# Patient Record
Sex: FEMALE | Race: OTHER | HISPANIC OR LATINO | URBAN - METROPOLITAN AREA
[De-identification: names, ages, dates, MRNs, and addresses within clinical notes are randomized per-mention and may not be internally consistent; named-entity substitution may affect disease eponyms.]

---

## 2017-01-01 ENCOUNTER — INPATIENT (INPATIENT)
Facility: HOSPITAL | Age: 77
LOS: 19 days | DRG: 637 | End: 2017-04-06
Admitting: FAMILY MEDICINE
Payer: MEDICARE

## 2017-01-01 VITALS
DIASTOLIC BLOOD PRESSURE: 80 MMHG | HEART RATE: 64 BPM | SYSTOLIC BLOOD PRESSURE: 178 MMHG | HEIGHT: 64 IN | WEIGHT: 130.07 LBS | RESPIRATION RATE: 12 BRPM

## 2017-01-01 VITALS — HEART RATE: 45 BPM

## 2017-01-01 DIAGNOSIS — E16.2 HYPOGLYCEMIA, UNSPECIFIED: ICD-10-CM

## 2017-01-01 DIAGNOSIS — E13.638 OTHER SPECIFIED DIABETES MELLITUS WITH OTHER ORAL COMPLICATIONS: ICD-10-CM

## 2017-01-01 DIAGNOSIS — R06.89 OTHER ABNORMALITIES OF BREATHING: ICD-10-CM

## 2017-01-01 DIAGNOSIS — J45.909 UNSPECIFIED ASTHMA, UNCOMPLICATED: ICD-10-CM

## 2017-01-01 DIAGNOSIS — N28.9 DISORDER OF KIDNEY AND URETER, UNSPECIFIED: ICD-10-CM

## 2017-01-01 DIAGNOSIS — I46.9 CARDIAC ARREST, CAUSE UNSPECIFIED: ICD-10-CM

## 2017-01-01 DIAGNOSIS — N17.9 ACUTE KIDNEY FAILURE, UNSPECIFIED: ICD-10-CM

## 2017-01-01 DIAGNOSIS — E11.9 TYPE 2 DIABETES MELLITUS WITHOUT COMPLICATIONS: ICD-10-CM

## 2017-01-01 DIAGNOSIS — K59.00 CONSTIPATION, UNSPECIFIED: ICD-10-CM

## 2017-01-01 DIAGNOSIS — R06.00 DYSPNEA, UNSPECIFIED: ICD-10-CM

## 2017-01-01 DIAGNOSIS — I50.9 HEART FAILURE, UNSPECIFIED: ICD-10-CM

## 2017-01-01 DIAGNOSIS — E11.21 TYPE 2 DIABETES MELLITUS WITH DIABETIC NEPHROPATHY: ICD-10-CM

## 2017-01-01 DIAGNOSIS — I48.91 UNSPECIFIED ATRIAL FIBRILLATION: ICD-10-CM

## 2017-01-01 DIAGNOSIS — N18.9 CHRONIC KIDNEY DISEASE, UNSPECIFIED: ICD-10-CM

## 2017-01-01 DIAGNOSIS — R93.8 ABNORMAL FINDINGS ON DIAGNOSTIC IMAGING OF OTHER SPECIFIED BODY STRUCTURES: ICD-10-CM

## 2017-01-01 DIAGNOSIS — R52 PAIN, UNSPECIFIED: ICD-10-CM

## 2017-01-01 DIAGNOSIS — I50.21 ACUTE SYSTOLIC (CONGESTIVE) HEART FAILURE: ICD-10-CM

## 2017-01-01 DIAGNOSIS — R05 COUGH: ICD-10-CM

## 2017-01-01 DIAGNOSIS — I10 ESSENTIAL (PRIMARY) HYPERTENSION: ICD-10-CM

## 2017-01-01 DIAGNOSIS — R07.89 OTHER CHEST PAIN: ICD-10-CM

## 2017-01-01 DIAGNOSIS — Z51.5 ENCOUNTER FOR PALLIATIVE CARE: ICD-10-CM

## 2017-01-01 DIAGNOSIS — R09.02 HYPOXEMIA: ICD-10-CM

## 2017-01-01 DIAGNOSIS — Z29.9 ENCOUNTER FOR PROPHYLACTIC MEASURES, UNSPECIFIED: ICD-10-CM

## 2017-01-01 LAB
ALBUMIN SERPL ELPH-MCNC: 2.6 G/DL — LOW (ref 3.3–5.2)
ALBUMIN SERPL ELPH-MCNC: 2.7 G/DL — LOW (ref 3.3–5.2)
ALBUMIN SERPL ELPH-MCNC: 2.8 G/DL — LOW (ref 3.3–5.2)
ALBUMIN SERPL ELPH-MCNC: 3.1 G/DL — LOW (ref 3.3–5.2)
ALBUMIN SERPL ELPH-MCNC: 3.2 G/DL — LOW (ref 3.3–5.2)
ALBUMIN SERPL ELPH-MCNC: 3.2 G/DL — LOW (ref 3.3–5.2)
ALBUMIN SERPL ELPH-MCNC: 3.4 G/DL — SIGNIFICANT CHANGE UP (ref 3.3–5.2)
ALP SERPL-CCNC: 111 U/L — SIGNIFICANT CHANGE UP (ref 40–120)
ALP SERPL-CCNC: 115 U/L — SIGNIFICANT CHANGE UP (ref 40–120)
ALP SERPL-CCNC: 68 U/L — SIGNIFICANT CHANGE UP (ref 40–120)
ALP SERPL-CCNC: 80 U/L — SIGNIFICANT CHANGE UP (ref 40–120)
ALP SERPL-CCNC: 88 U/L — SIGNIFICANT CHANGE UP (ref 40–120)
ALP SERPL-CCNC: 95 U/L — SIGNIFICANT CHANGE UP (ref 40–120)
ALP SERPL-CCNC: 96 U/L — SIGNIFICANT CHANGE UP (ref 40–120)
ALT FLD-CCNC: 103 U/L — HIGH
ALT FLD-CCNC: 111 U/L — HIGH
ALT FLD-CCNC: 33 U/L — HIGH
ALT FLD-CCNC: 56 U/L — HIGH
ALT FLD-CCNC: 66 U/L — HIGH
ALT FLD-CCNC: 72 U/L — HIGH
ALT FLD-CCNC: 90 U/L — HIGH
ANION GAP SERPL CALC-SCNC: 10 MMOL/L — SIGNIFICANT CHANGE UP (ref 5–17)
ANION GAP SERPL CALC-SCNC: 11 MMOL/L — SIGNIFICANT CHANGE UP (ref 5–17)
ANION GAP SERPL CALC-SCNC: 12 MMOL/L — SIGNIFICANT CHANGE UP (ref 5–17)
ANION GAP SERPL CALC-SCNC: 13 MMOL/L — SIGNIFICANT CHANGE UP (ref 5–17)
ANION GAP SERPL CALC-SCNC: 14 MMOL/L — SIGNIFICANT CHANGE UP (ref 5–17)
ANION GAP SERPL CALC-SCNC: 15 MMOL/L — SIGNIFICANT CHANGE UP (ref 5–17)
ANION GAP SERPL CALC-SCNC: 15 MMOL/L — SIGNIFICANT CHANGE UP (ref 5–17)
ANION GAP SERPL CALC-SCNC: 16 MMOL/L — SIGNIFICANT CHANGE UP (ref 5–17)
ANION GAP SERPL CALC-SCNC: 16 MMOL/L — SIGNIFICANT CHANGE UP (ref 5–17)
ANION GAP SERPL CALC-SCNC: 17 MMOL/L — SIGNIFICANT CHANGE UP (ref 5–17)
ANION GAP SERPL CALC-SCNC: 23 MMOL/L — HIGH (ref 5–17)
ANION GAP SERPL CALC-SCNC: 9 MMOL/L — SIGNIFICANT CHANGE UP (ref 5–17)
ANISOCYTOSIS BLD QL: SLIGHT — SIGNIFICANT CHANGE UP
ANISOCYTOSIS BLD QL: SLIGHT — SIGNIFICANT CHANGE UP
APAP SERPL-MCNC: <7.5 UG/ML — LOW (ref 10–26)
APPEARANCE UR: CLEAR — SIGNIFICANT CHANGE UP
APPEARANCE UR: CLEAR — SIGNIFICANT CHANGE UP
APTT BLD: 32 SEC — SIGNIFICANT CHANGE UP (ref 27.5–37.4)
APTT BLD: 34.1 SEC — SIGNIFICANT CHANGE UP (ref 27.5–37.4)
APTT BLD: 34.7 SEC — SIGNIFICANT CHANGE UP (ref 27.5–37.4)
AST SERPL-CCNC: 115 U/L — HIGH
AST SERPL-CCNC: 184 U/L — HIGH
AST SERPL-CCNC: 201 U/L — HIGH
AST SERPL-CCNC: 29 U/L — SIGNIFICANT CHANGE UP
AST SERPL-CCNC: 43 U/L — HIGH
AST SERPL-CCNC: 60 U/L — HIGH
AST SERPL-CCNC: 69 U/L — HIGH
BASE EXCESS BLDA CALC-SCNC: 11 MMOL/L — HIGH (ref -3–3)
BASE EXCESS BLDA CALC-SCNC: 15 MMOL/L — HIGH (ref -3–3)
BASOPHILS # BLD AUTO: 0 K/UL — SIGNIFICANT CHANGE UP (ref 0–0.2)
BASOPHILS NFR BLD AUTO: 0.1 % — SIGNIFICANT CHANGE UP (ref 0–2)
BASOPHILS NFR BLD AUTO: 0.1 % — SIGNIFICANT CHANGE UP (ref 0–2)
BASOPHILS NFR BLD AUTO: 0.3 % — SIGNIFICANT CHANGE UP (ref 0–2)
BILIRUB SERPL-MCNC: 0.3 MG/DL — LOW (ref 0.4–2)
BILIRUB SERPL-MCNC: 0.4 MG/DL — SIGNIFICANT CHANGE UP (ref 0.4–2)
BILIRUB SERPL-MCNC: 0.4 MG/DL — SIGNIFICANT CHANGE UP (ref 0.4–2)
BILIRUB SERPL-MCNC: 0.5 MG/DL — SIGNIFICANT CHANGE UP (ref 0.4–2)
BILIRUB SERPL-MCNC: 0.6 MG/DL — SIGNIFICANT CHANGE UP (ref 0.4–2)
BILIRUB UR-MCNC: NEGATIVE — SIGNIFICANT CHANGE UP
BILIRUB UR-MCNC: NEGATIVE — SIGNIFICANT CHANGE UP
BLD GP AB SCN SERPL QL: SIGNIFICANT CHANGE UP
BLOOD GAS COMMENTS ARTERIAL: SIGNIFICANT CHANGE UP
BLOOD GAS COMMENTS ARTERIAL: SIGNIFICANT CHANGE UP
BUN SERPL-MCNC: 106 MG/DL — CRITICAL HIGH (ref 8–20)
BUN SERPL-MCNC: 108 MG/DL — CRITICAL HIGH (ref 8–20)
BUN SERPL-MCNC: 110 MG/DL — CRITICAL HIGH (ref 8–20)
BUN SERPL-MCNC: 74 MG/DL — HIGH (ref 8–20)
BUN SERPL-MCNC: 74 MG/DL — HIGH (ref 8–20)
BUN SERPL-MCNC: 76 MG/DL — HIGH (ref 8–20)
BUN SERPL-MCNC: 77 MG/DL — HIGH (ref 8–20)
BUN SERPL-MCNC: 77 MG/DL — HIGH (ref 8–20)
BUN SERPL-MCNC: 78 MG/DL — HIGH (ref 8–20)
BUN SERPL-MCNC: 79 MG/DL — HIGH (ref 8–20)
BUN SERPL-MCNC: 79 MG/DL — HIGH (ref 8–20)
BUN SERPL-MCNC: 80 MG/DL — HIGH (ref 8–20)
BUN SERPL-MCNC: 84 MG/DL — HIGH (ref 8–20)
BUN SERPL-MCNC: 85 MG/DL — HIGH (ref 8–20)
BUN SERPL-MCNC: 85 MG/DL — HIGH (ref 8–20)
BUN SERPL-MCNC: 86 MG/DL — HIGH (ref 8–20)
BUN SERPL-MCNC: 87 MG/DL — HIGH (ref 8–20)
BUN SERPL-MCNC: 90 MG/DL — HIGH (ref 8–20)
BUN SERPL-MCNC: 96 MG/DL — HIGH (ref 8–20)
BUN SERPL-MCNC: 99 MG/DL — HIGH (ref 8–20)
CALCIUM SERPL-MCNC: 10.6 MG/DL — HIGH (ref 8.6–10.2)
CALCIUM SERPL-MCNC: 8.6 MG/DL — SIGNIFICANT CHANGE UP (ref 8.6–10.2)
CALCIUM SERPL-MCNC: 8.6 MG/DL — SIGNIFICANT CHANGE UP (ref 8.6–10.2)
CALCIUM SERPL-MCNC: 8.8 MG/DL — SIGNIFICANT CHANGE UP (ref 8.6–10.2)
CALCIUM SERPL-MCNC: 8.9 MG/DL — SIGNIFICANT CHANGE UP (ref 8.6–10.2)
CALCIUM SERPL-MCNC: 9 MG/DL — SIGNIFICANT CHANGE UP (ref 8.6–10.2)
CALCIUM SERPL-MCNC: 9.1 MG/DL — SIGNIFICANT CHANGE UP (ref 8.6–10.2)
CALCIUM SERPL-MCNC: 9.2 MG/DL — SIGNIFICANT CHANGE UP (ref 8.6–10.2)
CALCIUM SERPL-MCNC: 9.2 MG/DL — SIGNIFICANT CHANGE UP (ref 8.6–10.2)
CALCIUM SERPL-MCNC: 9.3 MG/DL — SIGNIFICANT CHANGE UP (ref 8.6–10.2)
CALCIUM SERPL-MCNC: 9.6 MG/DL — SIGNIFICANT CHANGE UP (ref 8.6–10.2)
CHLORIDE SERPL-SCNC: 83 MMOL/L — LOW (ref 98–107)
CHLORIDE SERPL-SCNC: 83 MMOL/L — LOW (ref 98–107)
CHLORIDE SERPL-SCNC: 86 MMOL/L — LOW (ref 98–107)
CHLORIDE SERPL-SCNC: 87 MMOL/L — LOW (ref 98–107)
CHLORIDE SERPL-SCNC: 88 MMOL/L — LOW (ref 98–107)
CHLORIDE SERPL-SCNC: 89 MMOL/L — LOW (ref 98–107)
CHLORIDE SERPL-SCNC: 90 MMOL/L — LOW (ref 98–107)
CHLORIDE SERPL-SCNC: 91 MMOL/L — LOW (ref 98–107)
CHLORIDE SERPL-SCNC: 94 MMOL/L — LOW (ref 98–107)
CO2 SERPL-SCNC: 27 MMOL/L — SIGNIFICANT CHANGE UP (ref 22–29)
CO2 SERPL-SCNC: 27 MMOL/L — SIGNIFICANT CHANGE UP (ref 22–29)
CO2 SERPL-SCNC: 28 MMOL/L — SIGNIFICANT CHANGE UP (ref 22–29)
CO2 SERPL-SCNC: 29 MMOL/L — SIGNIFICANT CHANGE UP (ref 22–29)
CO2 SERPL-SCNC: 31 MMOL/L — HIGH (ref 22–29)
CO2 SERPL-SCNC: 31 MMOL/L — HIGH (ref 22–29)
CO2 SERPL-SCNC: 32 MMOL/L — HIGH (ref 22–29)
CO2 SERPL-SCNC: 33 MMOL/L — HIGH (ref 22–29)
CO2 SERPL-SCNC: 34 MMOL/L — HIGH (ref 22–29)
CO2 SERPL-SCNC: 35 MMOL/L — HIGH (ref 22–29)
CO2 SERPL-SCNC: 35 MMOL/L — HIGH (ref 22–29)
CO2 SERPL-SCNC: 36 MMOL/L — HIGH (ref 22–29)
CO2 SERPL-SCNC: 38 MMOL/L — HIGH (ref 22–29)
COLOR SPEC: YELLOW — SIGNIFICANT CHANGE UP
COLOR SPEC: YELLOW — SIGNIFICANT CHANGE UP
CREAT SERPL-MCNC: 2.21 MG/DL — HIGH (ref 0.5–1.3)
CREAT SERPL-MCNC: 2.22 MG/DL — HIGH (ref 0.5–1.3)
CREAT SERPL-MCNC: 2.26 MG/DL — HIGH (ref 0.5–1.3)
CREAT SERPL-MCNC: 2.26 MG/DL — HIGH (ref 0.5–1.3)
CREAT SERPL-MCNC: 2.32 MG/DL — HIGH (ref 0.5–1.3)
CREAT SERPL-MCNC: 2.34 MG/DL — HIGH (ref 0.5–1.3)
CREAT SERPL-MCNC: 2.4 MG/DL — HIGH (ref 0.5–1.3)
CREAT SERPL-MCNC: 2.44 MG/DL — HIGH (ref 0.5–1.3)
CREAT SERPL-MCNC: 2.45 MG/DL — HIGH (ref 0.5–1.3)
CREAT SERPL-MCNC: 2.55 MG/DL — HIGH (ref 0.5–1.3)
CREAT SERPL-MCNC: 2.59 MG/DL — HIGH (ref 0.5–1.3)
CREAT SERPL-MCNC: 2.59 MG/DL — HIGH (ref 0.5–1.3)
CREAT SERPL-MCNC: 2.6 MG/DL — HIGH (ref 0.5–1.3)
CREAT SERPL-MCNC: 2.74 MG/DL — HIGH (ref 0.5–1.3)
CREAT SERPL-MCNC: 2.75 MG/DL — HIGH (ref 0.5–1.3)
CREAT SERPL-MCNC: 2.8 MG/DL — HIGH (ref 0.5–1.3)
CREAT SERPL-MCNC: 2.95 MG/DL — HIGH (ref 0.5–1.3)
CREAT SERPL-MCNC: 3.14 MG/DL — HIGH (ref 0.5–1.3)
CREAT SERPL-MCNC: 3.25 MG/DL — HIGH (ref 0.5–1.3)
CREAT SERPL-MCNC: 3.29 MG/DL — HIGH (ref 0.5–1.3)
CREAT SERPL-MCNC: 3.47 MG/DL — HIGH (ref 0.5–1.3)
CREAT SERPL-MCNC: 3.47 MG/DL — HIGH (ref 0.5–1.3)
CULTURE RESULTS: SIGNIFICANT CHANGE UP
CULTURE RESULTS: SIGNIFICANT CHANGE UP
DIFF PNL FLD: ABNORMAL
DIFF PNL FLD: NEGATIVE — SIGNIFICANT CHANGE UP
EOSINOPHIL # BLD AUTO: 0 K/UL — SIGNIFICANT CHANGE UP (ref 0–0.5)
EOSINOPHIL # BLD AUTO: 0.1 K/UL — SIGNIFICANT CHANGE UP (ref 0–0.5)
EOSINOPHIL # BLD AUTO: 0.2 K/UL — SIGNIFICANT CHANGE UP (ref 0–0.5)
EOSINOPHIL # BLD AUTO: 0.3 K/UL — SIGNIFICANT CHANGE UP (ref 0–0.5)
EOSINOPHIL NFR BLD AUTO: 0.2 % — SIGNIFICANT CHANGE UP (ref 0–6)
EOSINOPHIL NFR BLD AUTO: 1 % — SIGNIFICANT CHANGE UP (ref 0–5)
EOSINOPHIL NFR BLD AUTO: 1 % — SIGNIFICANT CHANGE UP (ref 0–5)
EOSINOPHIL NFR BLD AUTO: 1 % — SIGNIFICANT CHANGE UP (ref 0–6)
EOSINOPHIL NFR BLD AUTO: 1.3 % — SIGNIFICANT CHANGE UP (ref 0–6)
EOSINOPHIL NFR BLD AUTO: 1.6 % — SIGNIFICANT CHANGE UP (ref 0–6)
EOSINOPHIL NFR BLD AUTO: 2.8 % — SIGNIFICANT CHANGE UP (ref 0–6)
EOSINOPHIL NFR BLD AUTO: 3 % — SIGNIFICANT CHANGE UP (ref 0–5)
EPI CELLS # UR: SIGNIFICANT CHANGE UP
ETHANOL SERPL-MCNC: <10 MG/DL — SIGNIFICANT CHANGE UP
FERRITIN SERPL-MCNC: 304.8 NG/ML — SIGNIFICANT CHANGE UP (ref 11–306)
GAS PNL BLDA: SIGNIFICANT CHANGE UP
GLUCOSE SERPL-MCNC: 102 MG/DL — SIGNIFICANT CHANGE UP (ref 70–115)
GLUCOSE SERPL-MCNC: 113 MG/DL — SIGNIFICANT CHANGE UP (ref 70–115)
GLUCOSE SERPL-MCNC: 124 MG/DL — HIGH (ref 70–115)
GLUCOSE SERPL-MCNC: 125 MG/DL — HIGH (ref 70–115)
GLUCOSE SERPL-MCNC: 148 MG/DL — HIGH (ref 70–115)
GLUCOSE SERPL-MCNC: 158 MG/DL — HIGH (ref 70–115)
GLUCOSE SERPL-MCNC: 159 MG/DL — HIGH (ref 70–115)
GLUCOSE SERPL-MCNC: 172 MG/DL — HIGH (ref 70–115)
GLUCOSE SERPL-MCNC: 193 MG/DL — HIGH (ref 70–115)
GLUCOSE SERPL-MCNC: 201 MG/DL — HIGH (ref 70–115)
GLUCOSE SERPL-MCNC: 219 MG/DL — HIGH (ref 70–115)
GLUCOSE SERPL-MCNC: 243 MG/DL — HIGH (ref 70–115)
GLUCOSE SERPL-MCNC: 247 MG/DL — HIGH (ref 70–115)
GLUCOSE SERPL-MCNC: 266 MG/DL — HIGH (ref 70–115)
GLUCOSE SERPL-MCNC: 272 MG/DL — HIGH (ref 70–115)
GLUCOSE SERPL-MCNC: 313 MG/DL — HIGH (ref 70–115)
GLUCOSE SERPL-MCNC: 335 MG/DL — HIGH (ref 70–115)
GLUCOSE SERPL-MCNC: 355 MG/DL — HIGH (ref 70–115)
GLUCOSE SERPL-MCNC: 400 MG/DL — HIGH (ref 70–115)
GLUCOSE SERPL-MCNC: 422 MG/DL — HIGH (ref 70–115)
GLUCOSE SERPL-MCNC: 67 MG/DL — LOW (ref 70–115)
GLUCOSE SERPL-MCNC: 89 MG/DL — SIGNIFICANT CHANGE UP (ref 70–115)
GLUCOSE UR QL: 100 MG/DL
GLUCOSE UR QL: NEGATIVE MG/DL — SIGNIFICANT CHANGE UP
HBA1C BLD-MCNC: 8.9 % — HIGH (ref 4–5.6)
HBV SURFACE AB SER-ACNC: REACTIVE
HBV SURFACE AG SER-ACNC: SIGNIFICANT CHANGE UP
HCO3 BLDA-SCNC: 35 MMOL/L — HIGH (ref 20–26)
HCO3 BLDA-SCNC: 39 MMOL/L — HIGH (ref 20–26)
HCT VFR BLD CALC: 25 % — LOW (ref 37–47)
HCT VFR BLD CALC: 26 % — LOW (ref 37–47)
HCT VFR BLD CALC: 26.9 % — LOW (ref 37–47)
HCT VFR BLD CALC: 27.6 % — LOW (ref 37–47)
HCT VFR BLD CALC: 28.5 % — LOW (ref 37–47)
HCT VFR BLD CALC: 28.7 % — LOW (ref 37–47)
HCT VFR BLD CALC: 29.2 % — LOW (ref 37–47)
HCT VFR BLD CALC: 30.1 % — LOW (ref 37–47)
HCT VFR BLD CALC: 30.1 % — LOW (ref 37–47)
HCT VFR BLD CALC: 30.3 % — LOW (ref 37–47)
HCT VFR BLD CALC: 30.8 % — LOW (ref 37–47)
HCT VFR BLD CALC: 31.5 % — LOW (ref 37–47)
HCT VFR BLD CALC: 31.7 % — LOW (ref 37–47)
HCT VFR BLD CALC: 31.9 % — LOW (ref 37–47)
HCT VFR BLD CALC: 35.1 % — LOW (ref 37–47)
HCT VFR BLD CALC: 39.6 % — SIGNIFICANT CHANGE UP (ref 37–47)
HCT VFR BLD CALC: 42.1 % — SIGNIFICANT CHANGE UP (ref 37–47)
HCV AB S/CO SERPL IA: 0.57 S/CO — SIGNIFICANT CHANGE UP
HCV AB SERPL-IMP: SIGNIFICANT CHANGE UP
HGB BLD-MCNC: 10 G/DL — LOW (ref 12–16)
HGB BLD-MCNC: 10.3 G/DL — LOW (ref 12–16)
HGB BLD-MCNC: 10.5 G/DL — LOW (ref 12–16)
HGB BLD-MCNC: 10.6 G/DL — LOW (ref 12–16)
HGB BLD-MCNC: 11.3 G/DL — LOW (ref 12–16)
HGB BLD-MCNC: 12.7 G/DL — SIGNIFICANT CHANGE UP (ref 12–16)
HGB BLD-MCNC: 13.6 G/DL — SIGNIFICANT CHANGE UP (ref 12–16)
HGB BLD-MCNC: 8.1 G/DL — LOW (ref 12–16)
HGB BLD-MCNC: 8.4 G/DL — LOW (ref 12–16)
HGB BLD-MCNC: 8.8 G/DL — LOW (ref 12–16)
HGB BLD-MCNC: 9 G/DL — LOW (ref 12–16)
HGB BLD-MCNC: 9.2 G/DL — LOW (ref 12–16)
HGB BLD-MCNC: 9.3 G/DL — LOW (ref 12–16)
HGB BLD-MCNC: 9.5 G/DL — LOW (ref 12–16)
HGB BLD-MCNC: 9.8 G/DL — LOW (ref 12–16)
HGB BLD-MCNC: 9.9 G/DL — LOW (ref 12–16)
HGB BLD-MCNC: 9.9 G/DL — LOW (ref 12–16)
HOROWITZ INDEX BLDA+IHG-RTO: 0.3 — SIGNIFICANT CHANGE UP
HOROWITZ INDEX BLDA+IHG-RTO: SIGNIFICANT CHANGE UP
HYPOCHROMIA BLD QL: SLIGHT — SIGNIFICANT CHANGE UP
HYPOCHROMIA BLD QL: SLIGHT — SIGNIFICANT CHANGE UP
INR BLD: 1 RATIO — SIGNIFICANT CHANGE UP (ref 0.88–1.16)
INR BLD: 1.01 RATIO — SIGNIFICANT CHANGE UP (ref 0.88–1.16)
INR BLD: 1.03 RATIO — SIGNIFICANT CHANGE UP (ref 0.88–1.16)
INR BLD: 1.05 RATIO — SIGNIFICANT CHANGE UP (ref 0.88–1.16)
INR BLD: 1.19 RATIO — HIGH (ref 0.88–1.16)
INR BLD: 1.23 RATIO — HIGH (ref 0.88–1.16)
INR BLD: 1.31 RATIO — HIGH (ref 0.88–1.16)
INR BLD: 1.38 RATIO — HIGH (ref 0.88–1.16)
INR BLD: 1.45 RATIO — HIGH (ref 0.88–1.16)
IRON SATN MFR SERPL: 77 UG/DL — SIGNIFICANT CHANGE UP (ref 37–145)
KETONES UR-MCNC: NEGATIVE — SIGNIFICANT CHANGE UP
KETONES UR-MCNC: NEGATIVE — SIGNIFICANT CHANGE UP
LACTATE SERPL-SCNC: 1.7 MMOL/L — SIGNIFICANT CHANGE UP (ref 0.5–2)
LEUKOCYTE ESTERASE UR-ACNC: ABNORMAL
LEUKOCYTE ESTERASE UR-ACNC: NEGATIVE — SIGNIFICANT CHANGE UP
LYMPHOCYTES # BLD AUTO: 0.6 K/UL — LOW (ref 1–4.8)
LYMPHOCYTES # BLD AUTO: 0.6 K/UL — LOW (ref 1–4.8)
LYMPHOCYTES # BLD AUTO: 0.8 K/UL — LOW (ref 1–4.8)
LYMPHOCYTES # BLD AUTO: 1 K/UL — SIGNIFICANT CHANGE UP (ref 1–4.8)
LYMPHOCYTES # BLD AUTO: 1.4 K/UL — SIGNIFICANT CHANGE UP (ref 1–4.8)
LYMPHOCYTES # BLD AUTO: 10.3 % — LOW (ref 20–55)
LYMPHOCYTES # BLD AUTO: 18 % — LOW (ref 20–55)
LYMPHOCYTES # BLD AUTO: 39.1 % — SIGNIFICANT CHANGE UP (ref 20–55)
LYMPHOCYTES # BLD AUTO: 4.7 K/UL — SIGNIFICANT CHANGE UP (ref 1–4.8)
LYMPHOCYTES # BLD AUTO: 5 % — LOW (ref 20–55)
LYMPHOCYTES # BLD AUTO: 5.7 % — LOW (ref 20–55)
LYMPHOCYTES # BLD AUTO: 6 % — LOW (ref 20–55)
LYMPHOCYTES # BLD AUTO: 8 % — LOW (ref 20–55)
LYMPHOCYTES # BLD AUTO: 8.5 % — LOW (ref 20–55)
MACROCYTES BLD QL: SLIGHT — SIGNIFICANT CHANGE UP
MACROCYTES BLD QL: SLIGHT — SIGNIFICANT CHANGE UP
MAGNESIUM SERPL-MCNC: 1.8 MG/DL — SIGNIFICANT CHANGE UP (ref 1.8–2.5)
MAGNESIUM SERPL-MCNC: 1.9 MG/DL — SIGNIFICANT CHANGE UP (ref 1.8–2.5)
MAGNESIUM SERPL-MCNC: 1.9 MG/DL — SIGNIFICANT CHANGE UP (ref 1.8–2.5)
MAGNESIUM SERPL-MCNC: 2 MG/DL — SIGNIFICANT CHANGE UP (ref 1.8–2.5)
MAGNESIUM SERPL-MCNC: 2 MG/DL — SIGNIFICANT CHANGE UP (ref 1.8–2.5)
MAGNESIUM SERPL-MCNC: 2.1 MG/DL — SIGNIFICANT CHANGE UP (ref 1.8–2.5)
MAGNESIUM SERPL-MCNC: 2.2 MG/DL — SIGNIFICANT CHANGE UP (ref 1.8–2.5)
MAGNESIUM SERPL-MCNC: 2.3 MG/DL — SIGNIFICANT CHANGE UP (ref 1.8–2.5)
MAGNESIUM SERPL-MCNC: 2.3 MG/DL — SIGNIFICANT CHANGE UP (ref 1.8–2.5)
MAGNESIUM SERPL-MCNC: 2.4 MG/DL — SIGNIFICANT CHANGE UP (ref 1.8–2.5)
MAGNESIUM SERPL-MCNC: 2.7 MG/DL — HIGH (ref 1.8–2.5)
MCHC RBC-ENTMCNC: 28.6 PG — SIGNIFICANT CHANGE UP (ref 27–31)
MCHC RBC-ENTMCNC: 28.7 PG — SIGNIFICANT CHANGE UP (ref 27–31)
MCHC RBC-ENTMCNC: 28.7 PG — SIGNIFICANT CHANGE UP (ref 27–31)
MCHC RBC-ENTMCNC: 28.8 PG — SIGNIFICANT CHANGE UP (ref 27–31)
MCHC RBC-ENTMCNC: 28.9 PG — SIGNIFICANT CHANGE UP (ref 27–31)
MCHC RBC-ENTMCNC: 29 PG — SIGNIFICANT CHANGE UP (ref 27–31)
MCHC RBC-ENTMCNC: 29 PG — SIGNIFICANT CHANGE UP (ref 27–31)
MCHC RBC-ENTMCNC: 29.2 PG — SIGNIFICANT CHANGE UP (ref 27–31)
MCHC RBC-ENTMCNC: 29.3 PG — SIGNIFICANT CHANGE UP (ref 27–31)
MCHC RBC-ENTMCNC: 29.4 PG — SIGNIFICANT CHANGE UP (ref 27–31)
MCHC RBC-ENTMCNC: 29.8 PG — SIGNIFICANT CHANGE UP (ref 27–31)
MCHC RBC-ENTMCNC: 30 PG — SIGNIFICANT CHANGE UP (ref 27–31)
MCHC RBC-ENTMCNC: 31.3 PG — HIGH (ref 27–31)
MCHC RBC-ENTMCNC: 31.6 G/DL — LOW (ref 32–36)
MCHC RBC-ENTMCNC: 31.7 G/DL — LOW (ref 32–36)
MCHC RBC-ENTMCNC: 31.8 G/DL — LOW (ref 32–36)
MCHC RBC-ENTMCNC: 32.1 G/DL — SIGNIFICANT CHANGE UP (ref 32–36)
MCHC RBC-ENTMCNC: 32.1 G/DL — SIGNIFICANT CHANGE UP (ref 32–36)
MCHC RBC-ENTMCNC: 32.2 G/DL — SIGNIFICANT CHANGE UP (ref 32–36)
MCHC RBC-ENTMCNC: 32.3 G/DL — SIGNIFICANT CHANGE UP (ref 32–36)
MCHC RBC-ENTMCNC: 32.4 G/DL — SIGNIFICANT CHANGE UP (ref 32–36)
MCHC RBC-ENTMCNC: 32.6 G/DL — SIGNIFICANT CHANGE UP (ref 32–36)
MCHC RBC-ENTMCNC: 32.6 G/DL — SIGNIFICANT CHANGE UP (ref 32–36)
MCHC RBC-ENTMCNC: 32.7 G/DL — SIGNIFICANT CHANGE UP (ref 32–36)
MCHC RBC-ENTMCNC: 32.7 G/DL — SIGNIFICANT CHANGE UP (ref 32–36)
MCHC RBC-ENTMCNC: 33.1 G/DL — SIGNIFICANT CHANGE UP (ref 32–36)
MCHC RBC-ENTMCNC: 33.2 G/DL — SIGNIFICANT CHANGE UP (ref 32–36)
MCHC RBC-ENTMCNC: 35.9 G/DL — SIGNIFICANT CHANGE UP (ref 32–36)
MCV RBC AUTO: 87.2 FL — SIGNIFICANT CHANGE UP (ref 81–99)
MCV RBC AUTO: 87.6 FL — SIGNIFICANT CHANGE UP (ref 81–99)
MCV RBC AUTO: 87.9 FL — SIGNIFICANT CHANGE UP (ref 81–99)
MCV RBC AUTO: 88.3 FL — SIGNIFICANT CHANGE UP (ref 81–99)
MCV RBC AUTO: 88.7 FL — SIGNIFICANT CHANGE UP (ref 81–99)
MCV RBC AUTO: 89 FL — SIGNIFICANT CHANGE UP (ref 81–99)
MCV RBC AUTO: 89.1 FL — SIGNIFICANT CHANGE UP (ref 81–99)
MCV RBC AUTO: 89.1 FL — SIGNIFICANT CHANGE UP (ref 81–99)
MCV RBC AUTO: 89.8 FL — SIGNIFICANT CHANGE UP (ref 81–99)
MCV RBC AUTO: 90.3 FL — SIGNIFICANT CHANGE UP (ref 81–99)
MCV RBC AUTO: 90.4 FL — SIGNIFICANT CHANGE UP (ref 81–99)
MCV RBC AUTO: 90.6 FL — SIGNIFICANT CHANGE UP (ref 81–99)
MCV RBC AUTO: 91 FL — SIGNIFICANT CHANGE UP (ref 81–99)
MCV RBC AUTO: 91.2 FL — SIGNIFICANT CHANGE UP (ref 81–99)
MCV RBC AUTO: 91.5 FL — SIGNIFICANT CHANGE UP (ref 81–99)
MCV RBC AUTO: 91.5 FL — SIGNIFICANT CHANGE UP (ref 81–99)
MCV RBC AUTO: 92.9 FL — SIGNIFICANT CHANGE UP (ref 81–99)
MICROCYTES BLD QL: SLIGHT — SIGNIFICANT CHANGE UP
MICROCYTES BLD QL: SLIGHT — SIGNIFICANT CHANGE UP
MONOCYTES # BLD AUTO: 0.2 K/UL — SIGNIFICANT CHANGE UP (ref 0–0.8)
MONOCYTES # BLD AUTO: 0.5 K/UL — SIGNIFICANT CHANGE UP (ref 0–0.8)
MONOCYTES # BLD AUTO: 0.6 K/UL — SIGNIFICANT CHANGE UP (ref 0–0.8)
MONOCYTES # BLD AUTO: 0.9 K/UL — HIGH (ref 0–0.8)
MONOCYTES # BLD AUTO: 1 K/UL — HIGH (ref 0–0.8)
MONOCYTES # BLD AUTO: 1.2 K/UL — HIGH (ref 0–0.8)
MONOCYTES NFR BLD AUTO: 1.5 % — LOW (ref 3–10)
MONOCYTES NFR BLD AUTO: 11.5 % — HIGH (ref 3–10)
MONOCYTES NFR BLD AUTO: 13.2 % — HIGH (ref 3–10)
MONOCYTES NFR BLD AUTO: 4 % — SIGNIFICANT CHANGE UP (ref 3–10)
MONOCYTES NFR BLD AUTO: 4.6 % — SIGNIFICANT CHANGE UP (ref 3–10)
MONOCYTES NFR BLD AUTO: 5 % — SIGNIFICANT CHANGE UP (ref 3–10)
MONOCYTES NFR BLD AUTO: 9 % — SIGNIFICANT CHANGE UP (ref 3–10)
MONOCYTES NFR BLD AUTO: 9.1 % — SIGNIFICANT CHANGE UP (ref 3–10)
NEUTROPHILS # BLD AUTO: 5.2 K/UL — SIGNIFICANT CHANGE UP (ref 1.8–8)
NEUTROPHILS # BLD AUTO: 6.2 K/UL — SIGNIFICANT CHANGE UP (ref 1.8–8)
NEUTROPHILS # BLD AUTO: 7.6 K/UL — SIGNIFICANT CHANGE UP (ref 1.8–8)
NEUTROPHILS # BLD AUTO: 7.9 K/UL — SIGNIFICANT CHANGE UP (ref 1.8–8)
NEUTROPHILS # BLD AUTO: 9.1 K/UL — HIGH (ref 1.8–8)
NEUTROPHILS # BLD AUTO: 9.9 K/UL — HIGH (ref 1.8–8)
NEUTROPHILS NFR BLD AUTO: 51.5 % — SIGNIFICANT CHANGE UP (ref 37–73)
NEUTROPHILS NFR BLD AUTO: 66.9 % — SIGNIFICANT CHANGE UP (ref 37–73)
NEUTROPHILS NFR BLD AUTO: 78.2 % — HIGH (ref 37–73)
NEUTROPHILS NFR BLD AUTO: 78.6 % — HIGH (ref 37–73)
NEUTROPHILS NFR BLD AUTO: 82 % — HIGH (ref 37–73)
NEUTROPHILS NFR BLD AUTO: 86 % — HIGH (ref 37–73)
NEUTROPHILS NFR BLD AUTO: 86 % — HIGH (ref 37–73)
NEUTROPHILS NFR BLD AUTO: 92.2 % — HIGH (ref 37–73)
NEUTS BAND # BLD: 4 % — SIGNIFICANT CHANGE UP (ref 0–8)
NITRITE UR-MCNC: NEGATIVE — SIGNIFICANT CHANGE UP
NITRITE UR-MCNC: NEGATIVE — SIGNIFICANT CHANGE UP
NT-PROBNP SERPL-SCNC: HIGH PG/ML (ref 0–300)
OVALOCYTES BLD QL SMEAR: SLIGHT — SIGNIFICANT CHANGE UP
PCO2 BLDA: 53 MMHG — HIGH (ref 35–45)
PCO2 BLDA: 54 MMHG — HIGH (ref 35–45)
PH BLDA: 7.45 — SIGNIFICANT CHANGE UP (ref 7.35–7.45)
PH BLDA: 7.48 — HIGH (ref 7.35–7.45)
PH UR: 5 — SIGNIFICANT CHANGE UP (ref 4.8–8)
PH UR: 6 — SIGNIFICANT CHANGE UP (ref 4.8–8)
PHOSPHATE SERPL-MCNC: 3.3 MG/DL — SIGNIFICANT CHANGE UP (ref 2.4–4.7)
PHOSPHATE SERPL-MCNC: 3.3 MG/DL — SIGNIFICANT CHANGE UP (ref 2.4–4.7)
PHOSPHATE SERPL-MCNC: 3.9 MG/DL — SIGNIFICANT CHANGE UP (ref 2.4–4.7)
PHOSPHATE SERPL-MCNC: 4 MG/DL — SIGNIFICANT CHANGE UP (ref 2.4–4.7)
PHOSPHATE SERPL-MCNC: 4.3 MG/DL — SIGNIFICANT CHANGE UP (ref 2.4–4.7)
PHOSPHATE SERPL-MCNC: 4.4 MG/DL — SIGNIFICANT CHANGE UP (ref 2.4–4.7)
PHOSPHATE SERPL-MCNC: 4.6 MG/DL — SIGNIFICANT CHANGE UP (ref 2.4–4.7)
PHOSPHATE SERPL-MCNC: 4.7 MG/DL — SIGNIFICANT CHANGE UP (ref 2.4–4.7)
PHOSPHATE SERPL-MCNC: 5 MG/DL — HIGH (ref 2.4–4.7)
PHOSPHATE SERPL-MCNC: 5.7 MG/DL — HIGH (ref 2.4–4.7)
PHOSPHATE SERPL-MCNC: 8.3 MG/DL — HIGH (ref 2.4–4.7)
PLAT MORPH BLD: NORMAL — SIGNIFICANT CHANGE UP
PLATELET # BLD AUTO: 107 K/UL — LOW (ref 150–400)
PLATELET # BLD AUTO: 108 K/UL — LOW (ref 150–400)
PLATELET # BLD AUTO: 126 K/UL — LOW (ref 150–400)
PLATELET # BLD AUTO: 134 K/UL — LOW (ref 150–400)
PLATELET # BLD AUTO: 138 K/UL — LOW (ref 150–400)
PLATELET # BLD AUTO: 141 K/UL — LOW (ref 150–400)
PLATELET # BLD AUTO: 143 K/UL — LOW (ref 150–400)
PLATELET # BLD AUTO: 154 K/UL — SIGNIFICANT CHANGE UP (ref 150–400)
PLATELET # BLD AUTO: 158 K/UL — SIGNIFICANT CHANGE UP (ref 150–400)
PLATELET # BLD AUTO: 161 K/UL — SIGNIFICANT CHANGE UP (ref 150–400)
PLATELET # BLD AUTO: 170 K/UL — SIGNIFICANT CHANGE UP (ref 150–400)
PLATELET # BLD AUTO: 172 K/UL — SIGNIFICANT CHANGE UP (ref 150–400)
PLATELET # BLD AUTO: 174 K/UL — SIGNIFICANT CHANGE UP (ref 150–400)
PLATELET # BLD AUTO: 175 K/UL — SIGNIFICANT CHANGE UP (ref 150–400)
PLATELET # BLD AUTO: 179 K/UL — SIGNIFICANT CHANGE UP (ref 150–400)
PLATELET # BLD AUTO: 194 K/UL — SIGNIFICANT CHANGE UP (ref 150–400)
PLATELET # BLD AUTO: 99 K/UL — LOW (ref 150–400)
PO2 BLDA: 57 MMHG — LOW (ref 83–108)
PO2 BLDA: 72 MMHG — LOW (ref 83–108)
POIKILOCYTOSIS BLD QL AUTO: SLIGHT — SIGNIFICANT CHANGE UP
POTASSIUM SERPL-MCNC: 4 MMOL/L — SIGNIFICANT CHANGE UP (ref 3.5–5.3)
POTASSIUM SERPL-MCNC: 4 MMOL/L — SIGNIFICANT CHANGE UP (ref 3.5–5.3)
POTASSIUM SERPL-MCNC: 4.2 MMOL/L — SIGNIFICANT CHANGE UP (ref 3.5–5.3)
POTASSIUM SERPL-MCNC: 4.2 MMOL/L — SIGNIFICANT CHANGE UP (ref 3.5–5.3)
POTASSIUM SERPL-MCNC: 4.5 MMOL/L — SIGNIFICANT CHANGE UP (ref 3.5–5.3)
POTASSIUM SERPL-MCNC: 4.5 MMOL/L — SIGNIFICANT CHANGE UP (ref 3.5–5.3)
POTASSIUM SERPL-MCNC: 4.6 MMOL/L — SIGNIFICANT CHANGE UP (ref 3.5–5.3)
POTASSIUM SERPL-MCNC: 4.6 MMOL/L — SIGNIFICANT CHANGE UP (ref 3.5–5.3)
POTASSIUM SERPL-MCNC: 4.7 MMOL/L — SIGNIFICANT CHANGE UP (ref 3.5–5.3)
POTASSIUM SERPL-MCNC: 4.8 MMOL/L — SIGNIFICANT CHANGE UP (ref 3.5–5.3)
POTASSIUM SERPL-MCNC: 5.2 MMOL/L — SIGNIFICANT CHANGE UP (ref 3.5–5.3)
POTASSIUM SERPL-MCNC: 5.3 MMOL/L — SIGNIFICANT CHANGE UP (ref 3.5–5.3)
POTASSIUM SERPL-MCNC: 5.4 MMOL/L — HIGH (ref 3.5–5.3)
POTASSIUM SERPL-MCNC: 5.6 MMOL/L — HIGH (ref 3.5–5.3)
POTASSIUM SERPL-SCNC: 4 MMOL/L — SIGNIFICANT CHANGE UP (ref 3.5–5.3)
POTASSIUM SERPL-SCNC: 4 MMOL/L — SIGNIFICANT CHANGE UP (ref 3.5–5.3)
POTASSIUM SERPL-SCNC: 4.2 MMOL/L — SIGNIFICANT CHANGE UP (ref 3.5–5.3)
POTASSIUM SERPL-SCNC: 4.2 MMOL/L — SIGNIFICANT CHANGE UP (ref 3.5–5.3)
POTASSIUM SERPL-SCNC: 4.5 MMOL/L — SIGNIFICANT CHANGE UP (ref 3.5–5.3)
POTASSIUM SERPL-SCNC: 4.5 MMOL/L — SIGNIFICANT CHANGE UP (ref 3.5–5.3)
POTASSIUM SERPL-SCNC: 4.6 MMOL/L — SIGNIFICANT CHANGE UP (ref 3.5–5.3)
POTASSIUM SERPL-SCNC: 4.6 MMOL/L — SIGNIFICANT CHANGE UP (ref 3.5–5.3)
POTASSIUM SERPL-SCNC: 4.7 MMOL/L — SIGNIFICANT CHANGE UP (ref 3.5–5.3)
POTASSIUM SERPL-SCNC: 4.8 MMOL/L — SIGNIFICANT CHANGE UP (ref 3.5–5.3)
POTASSIUM SERPL-SCNC: 5.2 MMOL/L — SIGNIFICANT CHANGE UP (ref 3.5–5.3)
POTASSIUM SERPL-SCNC: 5.3 MMOL/L — SIGNIFICANT CHANGE UP (ref 3.5–5.3)
POTASSIUM SERPL-SCNC: 5.4 MMOL/L — HIGH (ref 3.5–5.3)
POTASSIUM SERPL-SCNC: 5.6 MMOL/L — HIGH (ref 3.5–5.3)
PROT SERPL-MCNC: 5.5 G/DL — LOW (ref 6.6–8.7)
PROT SERPL-MCNC: 6.1 G/DL — LOW (ref 6.6–8.7)
PROT SERPL-MCNC: 6.2 G/DL — LOW (ref 6.6–8.7)
PROT SERPL-MCNC: 6.2 G/DL — LOW (ref 6.6–8.7)
PROT SERPL-MCNC: 6.4 G/DL — LOW (ref 6.6–8.7)
PROT SERPL-MCNC: 6.8 G/DL — SIGNIFICANT CHANGE UP (ref 6.6–8.7)
PROT SERPL-MCNC: 7.4 G/DL — SIGNIFICANT CHANGE UP (ref 6.6–8.7)
PROT UR-MCNC: NEGATIVE MG/DL — SIGNIFICANT CHANGE UP
PROT UR-MCNC: NEGATIVE MG/DL — SIGNIFICANT CHANGE UP
PROTHROM AB SERPL-ACNC: 11 SEC — SIGNIFICANT CHANGE UP (ref 9.8–12.7)
PROTHROM AB SERPL-ACNC: 11.1 SEC — SIGNIFICANT CHANGE UP (ref 9.8–12.7)
PROTHROM AB SERPL-ACNC: 11.3 SEC — SIGNIFICANT CHANGE UP (ref 9.8–12.7)
PROTHROM AB SERPL-ACNC: 11.6 SEC — SIGNIFICANT CHANGE UP (ref 9.8–12.7)
PROTHROM AB SERPL-ACNC: 13.1 SEC — SIGNIFICANT CHANGE UP (ref 10–13.1)
PROTHROM AB SERPL-ACNC: 13.6 SEC — HIGH (ref 9.8–12.7)
PROTHROM AB SERPL-ACNC: 14.5 SEC — HIGH (ref 9.8–12.7)
PROTHROM AB SERPL-ACNC: 15.3 SEC — HIGH (ref 9.8–12.7)
PROTHROM AB SERPL-ACNC: 16.1 SEC — HIGH (ref 9.8–12.7)
RBC # BLD: 2.76 M/UL — LOW (ref 4.4–5.2)
RBC # BLD: 2.8 M/UL — LOW (ref 4.4–5.2)
RBC # BLD: 2.95 M/UL — LOW (ref 4.4–5.2)
RBC # BLD: 3.11 M/UL — LOW (ref 4.4–5.2)
RBC # BLD: 3.19 M/UL — LOW (ref 4.4–5.2)
RBC # BLD: 3.2 M/UL — LOW (ref 4.4–5.2)
RBC # BLD: 3.29 M/UL — LOW (ref 4.4–5.2)
RBC # BLD: 3.29 M/UL — LOW (ref 4.4–5.2)
RBC # BLD: 3.33 M/UL — LOW (ref 4.4–5.2)
RBC # BLD: 3.43 M/UL — LOW (ref 4.4–5.2)
RBC # BLD: 3.46 M/UL — LOW (ref 4.4–5.2)
RBC # BLD: 3.49 M/UL — LOW (ref 4.4–5.2)
RBC # BLD: 3.62 M/UL — LOW (ref 4.4–5.2)
RBC # BLD: 3.63 M/UL — LOW (ref 4.4–5.2)
RBC # BLD: 3.94 M/UL — LOW (ref 4.4–5.2)
RBC # BLD: 4.35 M/UL — LOW (ref 4.4–5.2)
RBC # BLD: 4.69 M/UL — SIGNIFICANT CHANGE UP (ref 4.4–5.2)
RBC # FLD: 13.2 % — SIGNIFICANT CHANGE UP (ref 11–15.6)
RBC # FLD: 13.3 % — SIGNIFICANT CHANGE UP (ref 11–15.6)
RBC # FLD: 13.7 % — SIGNIFICANT CHANGE UP (ref 11–15.6)
RBC # FLD: 14.1 % — SIGNIFICANT CHANGE UP (ref 11–15.6)
RBC # FLD: 14.2 % — SIGNIFICANT CHANGE UP (ref 11–15.6)
RBC # FLD: 14.3 % — SIGNIFICANT CHANGE UP (ref 11–15.6)
RBC # FLD: 14.5 % — SIGNIFICANT CHANGE UP (ref 11–15.6)
RBC # FLD: 14.5 % — SIGNIFICANT CHANGE UP (ref 11–15.6)
RBC # FLD: 14.9 % — SIGNIFICANT CHANGE UP (ref 11–15.6)
RBC # FLD: 15 % — SIGNIFICANT CHANGE UP (ref 11–15.6)
RBC # FLD: 15 % — SIGNIFICANT CHANGE UP (ref 11–15.6)
RBC # FLD: 15.1 % — SIGNIFICANT CHANGE UP (ref 11–15.6)
RBC # FLD: 15.4 % — SIGNIFICANT CHANGE UP (ref 11–15.6)
RBC # FLD: 15.8 % — HIGH (ref 11–15.6)
RBC # FLD: 16.6 % — HIGH (ref 11–15.6)
RBC # FLD: 16.8 % — HIGH (ref 11–15.6)
RBC # FLD: 17.3 % — HIGH (ref 11–15.6)
RBC BLD AUTO: ABNORMAL
SALICYLATES SERPL-MCNC: <2 MG/DL — LOW (ref 10–20)
SAO2 % BLDA: 90 % — LOW (ref 95–99)
SAO2 % BLDA: 95 % — SIGNIFICANT CHANGE UP (ref 95–99)
SODIUM SERPL-SCNC: 127 MMOL/L — LOW (ref 135–145)
SODIUM SERPL-SCNC: 131 MMOL/L — LOW (ref 135–145)
SODIUM SERPL-SCNC: 132 MMOL/L — LOW (ref 135–145)
SODIUM SERPL-SCNC: 133 MMOL/L — LOW (ref 135–145)
SODIUM SERPL-SCNC: 134 MMOL/L — LOW (ref 135–145)
SODIUM SERPL-SCNC: 134 MMOL/L — LOW (ref 135–145)
SODIUM SERPL-SCNC: 135 MMOL/L — SIGNIFICANT CHANGE UP (ref 135–145)
SODIUM SERPL-SCNC: 136 MMOL/L — SIGNIFICANT CHANGE UP (ref 135–145)
SODIUM SERPL-SCNC: 136 MMOL/L — SIGNIFICANT CHANGE UP (ref 135–145)
SODIUM SERPL-SCNC: 137 MMOL/L — SIGNIFICANT CHANGE UP (ref 135–145)
SODIUM SERPL-SCNC: 137 MMOL/L — SIGNIFICANT CHANGE UP (ref 135–145)
SODIUM SERPL-SCNC: 138 MMOL/L — SIGNIFICANT CHANGE UP (ref 135–145)
SODIUM SERPL-SCNC: 139 MMOL/L — SIGNIFICANT CHANGE UP (ref 135–145)
SODIUM SERPL-SCNC: 140 MMOL/L — SIGNIFICANT CHANGE UP (ref 135–145)
SODIUM SERPL-SCNC: 140 MMOL/L — SIGNIFICANT CHANGE UP (ref 135–145)
SP GR SPEC: 1.01 — SIGNIFICANT CHANGE UP (ref 1.01–1.02)
SP GR SPEC: 1.01 — SIGNIFICANT CHANGE UP (ref 1.01–1.02)
SPECIMEN SOURCE: SIGNIFICANT CHANGE UP
SPECIMEN SOURCE: SIGNIFICANT CHANGE UP
TROPONIN T SERPL-MCNC: 0.1 NG/ML — HIGH (ref 0–0.06)
TROPONIN T SERPL-MCNC: 0.24 NG/ML — HIGH (ref 0–0.06)
TROPONIN T SERPL-MCNC: 0.26 NG/ML — HIGH (ref 0–0.06)
TROPONIN T SERPL-MCNC: 0.4 NG/ML — HIGH (ref 0–0.06)
TYPE + AB SCN PNL BLD: SIGNIFICANT CHANGE UP
UROBILINOGEN FLD QL: NEGATIVE MG/DL — SIGNIFICANT CHANGE UP
UROBILINOGEN FLD QL: NEGATIVE MG/DL — SIGNIFICANT CHANGE UP
WBC # BLD: 10 K/UL — SIGNIFICANT CHANGE UP (ref 4.8–10.8)
WBC # BLD: 10.1 K/UL — SIGNIFICANT CHANGE UP (ref 4.8–10.8)
WBC # BLD: 11.1 K/UL — HIGH (ref 4.8–10.8)
WBC # BLD: 12.1 K/UL — HIGH (ref 4.8–10.8)
WBC # BLD: 12.9 K/UL — HIGH (ref 4.8–10.8)
WBC # BLD: 5.9 K/UL — SIGNIFICANT CHANGE UP (ref 4.8–10.8)
WBC # BLD: 7 K/UL — SIGNIFICANT CHANGE UP (ref 4.8–10.8)
WBC # BLD: 7.8 K/UL — SIGNIFICANT CHANGE UP (ref 4.8–10.8)
WBC # BLD: 7.9 K/UL — SIGNIFICANT CHANGE UP (ref 4.8–10.8)
WBC # BLD: 8 K/UL — SIGNIFICANT CHANGE UP (ref 4.8–10.8)
WBC # BLD: 8.4 K/UL — SIGNIFICANT CHANGE UP (ref 4.8–10.8)
WBC # BLD: 8.8 K/UL — SIGNIFICANT CHANGE UP (ref 4.8–10.8)
WBC # BLD: 8.9 K/UL — SIGNIFICANT CHANGE UP (ref 4.8–10.8)
WBC # BLD: 9.2 K/UL — SIGNIFICANT CHANGE UP (ref 4.8–10.8)
WBC # BLD: 9.6 K/UL — SIGNIFICANT CHANGE UP (ref 4.8–10.8)
WBC # BLD: 9.8 K/UL — SIGNIFICANT CHANGE UP (ref 4.8–10.8)
WBC # BLD: 9.8 K/UL — SIGNIFICANT CHANGE UP (ref 4.8–10.8)
WBC # FLD AUTO: 10 K/UL — SIGNIFICANT CHANGE UP (ref 4.8–10.8)
WBC # FLD AUTO: 10.1 K/UL — SIGNIFICANT CHANGE UP (ref 4.8–10.8)
WBC # FLD AUTO: 11.1 K/UL — HIGH (ref 4.8–10.8)
WBC # FLD AUTO: 12.1 K/UL — HIGH (ref 4.8–10.8)
WBC # FLD AUTO: 12.9 K/UL — HIGH (ref 4.8–10.8)
WBC # FLD AUTO: 5.9 K/UL — SIGNIFICANT CHANGE UP (ref 4.8–10.8)
WBC # FLD AUTO: 7 K/UL — SIGNIFICANT CHANGE UP (ref 4.8–10.8)
WBC # FLD AUTO: 7.8 K/UL — SIGNIFICANT CHANGE UP (ref 4.8–10.8)
WBC # FLD AUTO: 7.9 K/UL — SIGNIFICANT CHANGE UP (ref 4.8–10.8)
WBC # FLD AUTO: 8 K/UL — SIGNIFICANT CHANGE UP (ref 4.8–10.8)
WBC # FLD AUTO: 8.4 K/UL — SIGNIFICANT CHANGE UP (ref 4.8–10.8)
WBC # FLD AUTO: 8.8 K/UL — SIGNIFICANT CHANGE UP (ref 4.8–10.8)
WBC # FLD AUTO: 8.9 K/UL — SIGNIFICANT CHANGE UP (ref 4.8–10.8)
WBC # FLD AUTO: 9.2 K/UL — SIGNIFICANT CHANGE UP (ref 4.8–10.8)
WBC # FLD AUTO: 9.6 K/UL — SIGNIFICANT CHANGE UP (ref 4.8–10.8)
WBC # FLD AUTO: 9.8 K/UL — SIGNIFICANT CHANGE UP (ref 4.8–10.8)
WBC # FLD AUTO: 9.8 K/UL — SIGNIFICANT CHANGE UP (ref 4.8–10.8)
WBC UR QL: SIGNIFICANT CHANGE UP

## 2017-01-01 PROCEDURE — 99233 SBSQ HOSP IP/OBS HIGH 50: CPT

## 2017-01-01 PROCEDURE — 93010 ELECTROCARDIOGRAM REPORT: CPT

## 2017-01-01 PROCEDURE — 99222 1ST HOSP IP/OBS MODERATE 55: CPT

## 2017-01-01 PROCEDURE — 76775 US EXAM ABDO BACK WALL LIM: CPT | Mod: 26

## 2017-01-01 PROCEDURE — 36620 INSERTION CATHETER ARTERY: CPT

## 2017-01-01 PROCEDURE — 71010: CPT | Mod: 26

## 2017-01-01 PROCEDURE — 99232 SBSQ HOSP IP/OBS MODERATE 35: CPT

## 2017-01-01 PROCEDURE — 92950 HEART/LUNG RESUSCITATION CPR: CPT

## 2017-01-01 PROCEDURE — 99291 CRITICAL CARE FIRST HOUR: CPT

## 2017-01-01 PROCEDURE — 36556 INSERT NON-TUNNEL CV CATH: CPT

## 2017-01-01 PROCEDURE — 99291 CRITICAL CARE FIRST HOUR: CPT | Mod: 25

## 2017-01-01 PROCEDURE — 70450 CT HEAD/BRAIN W/O DYE: CPT | Mod: 26

## 2017-01-01 PROCEDURE — 76937 US GUIDE VASCULAR ACCESS: CPT | Mod: 26

## 2017-01-01 RX ORDER — LACTULOSE 10 G/15ML
20 SOLUTION ORAL ONCE
Qty: 0 | Refills: 0 | Status: COMPLETED | OUTPATIENT
Start: 2017-01-01 | End: 2017-01-01

## 2017-01-01 RX ORDER — FUROSEMIDE 40 MG
80 TABLET ORAL
Qty: 0 | Refills: 0 | Status: DISCONTINUED | OUTPATIENT
Start: 2017-01-01 | End: 2017-01-01

## 2017-01-01 RX ORDER — WARFARIN SODIUM 2.5 MG/1
2.5 TABLET ORAL
Qty: 0 | Refills: 0 | Status: COMPLETED | OUTPATIENT
Start: 2017-01-01 | End: 2017-01-01

## 2017-01-01 RX ORDER — ENOXAPARIN SODIUM 100 MG/ML
70 INJECTION SUBCUTANEOUS DAILY
Qty: 0 | Refills: 0 | Status: DISCONTINUED | OUTPATIENT
Start: 2017-01-01 | End: 2017-01-01

## 2017-01-01 RX ORDER — SODIUM CHLORIDE 9 MG/ML
1000 INJECTION, SOLUTION INTRAVENOUS
Qty: 0 | Refills: 0 | Status: DISCONTINUED | OUTPATIENT
Start: 2017-01-01 | End: 2017-01-01

## 2017-01-01 RX ORDER — INSULIN LISPRO 100/ML
8 VIAL (ML) SUBCUTANEOUS
Qty: 0 | Refills: 0 | Status: DISCONTINUED | OUTPATIENT
Start: 2017-01-01 | End: 2017-01-01

## 2017-01-01 RX ORDER — FENTANYL CITRATE 50 UG/ML
50 INJECTION INTRAVENOUS
Qty: 0 | Refills: 0 | Status: DISCONTINUED | OUTPATIENT
Start: 2017-01-01 | End: 2017-01-01

## 2017-01-01 RX ORDER — ACETAMINOPHEN 500 MG
1000 TABLET ORAL ONCE
Qty: 0 | Refills: 0 | Status: COMPLETED | OUTPATIENT
Start: 2017-01-01 | End: 2017-01-01

## 2017-01-01 RX ORDER — INSULIN LISPRO 100/ML
10 VIAL (ML) SUBCUTANEOUS
Qty: 0 | Refills: 0 | Status: DISCONTINUED | OUTPATIENT
Start: 2017-01-01 | End: 2017-01-01

## 2017-01-01 RX ORDER — IPRATROPIUM/ALBUTEROL SULFATE 18-103MCG
3 AEROSOL WITH ADAPTER (GRAM) INHALATION EVERY 6 HOURS
Qty: 0 | Refills: 0 | Status: DISCONTINUED | OUTPATIENT
Start: 2017-01-01 | End: 2017-01-01

## 2017-01-01 RX ORDER — INSULIN GLARGINE 100 [IU]/ML
15 INJECTION, SOLUTION SUBCUTANEOUS AT BEDTIME
Qty: 0 | Refills: 0 | Status: DISCONTINUED | OUTPATIENT
Start: 2017-01-01 | End: 2017-01-01

## 2017-01-01 RX ORDER — INSULIN LISPRO 100/ML
VIAL (ML) SUBCUTANEOUS EVERY 6 HOURS
Qty: 0 | Refills: 0 | Status: DISCONTINUED | OUTPATIENT
Start: 2017-01-01 | End: 2017-01-01

## 2017-01-01 RX ORDER — CARVEDILOL PHOSPHATE 80 MG/1
6.25 CAPSULE, EXTENDED RELEASE ORAL EVERY 12 HOURS
Qty: 0 | Refills: 0 | Status: DISCONTINUED | OUTPATIENT
Start: 2017-01-01 | End: 2017-01-01

## 2017-01-01 RX ORDER — DEXMEDETOMIDINE HYDROCHLORIDE IN 0.9% SODIUM CHLORIDE 4 UG/ML
0.3 INJECTION INTRAVENOUS
Qty: 200 | Refills: 0 | Status: DISCONTINUED | OUTPATIENT
Start: 2017-01-01 | End: 2017-01-01

## 2017-01-01 RX ORDER — PROPOFOL 10 MG/ML
20 INJECTION, EMULSION INTRAVENOUS
Qty: 1000 | Refills: 0 | Status: DISCONTINUED | OUTPATIENT
Start: 2017-01-01 | End: 2017-01-01

## 2017-01-01 RX ORDER — GLUCAGON INJECTION, SOLUTION 0.5 MG/.1ML
1 INJECTION, SOLUTION SUBCUTANEOUS ONCE
Qty: 0 | Refills: 0 | Status: DISCONTINUED | OUTPATIENT
Start: 2017-01-01 | End: 2017-01-01

## 2017-01-01 RX ORDER — INSULIN GLARGINE 100 [IU]/ML
10 INJECTION, SOLUTION SUBCUTANEOUS AT BEDTIME
Qty: 0 | Refills: 0 | Status: DISCONTINUED | OUTPATIENT
Start: 2017-01-01 | End: 2017-01-01

## 2017-01-01 RX ORDER — DEXTROSE 50 % IN WATER 50 %
12.5 SYRINGE (ML) INTRAVENOUS ONCE
Qty: 0 | Refills: 0 | Status: DISCONTINUED | OUTPATIENT
Start: 2017-01-01 | End: 2017-01-01

## 2017-01-01 RX ORDER — AZITHROMYCIN 500 MG/1
TABLET, FILM COATED ORAL
Qty: 0 | Refills: 0 | Status: DISCONTINUED | OUTPATIENT
Start: 2017-01-01 | End: 2017-01-01

## 2017-01-01 RX ORDER — HYDRALAZINE HCL 50 MG
25 TABLET ORAL EVERY 8 HOURS
Qty: 0 | Refills: 0 | Status: DISCONTINUED | OUTPATIENT
Start: 2017-01-01 | End: 2017-01-01

## 2017-01-01 RX ORDER — FUROSEMIDE 40 MG
40 TABLET ORAL DAILY
Qty: 0 | Refills: 0 | Status: DISCONTINUED | OUTPATIENT
Start: 2017-01-01 | End: 2017-01-01

## 2017-01-01 RX ORDER — DEXTROSE 50 % IN WATER 50 %
1 SYRINGE (ML) INTRAVENOUS ONCE
Qty: 0 | Refills: 0 | Status: DISCONTINUED | OUTPATIENT
Start: 2017-01-01 | End: 2017-01-01

## 2017-01-01 RX ORDER — SENNA PLUS 8.6 MG/1
2 TABLET ORAL AT BEDTIME
Qty: 0 | Refills: 0 | Status: DISCONTINUED | OUTPATIENT
Start: 2017-01-01 | End: 2017-01-01

## 2017-01-01 RX ORDER — SODIUM CHLORIDE 9 MG/ML
1000 INJECTION INTRAMUSCULAR; INTRAVENOUS; SUBCUTANEOUS
Qty: 0 | Refills: 0 | Status: DISCONTINUED | OUTPATIENT
Start: 2017-01-01 | End: 2017-01-01

## 2017-01-01 RX ORDER — FUROSEMIDE 40 MG
60 TABLET ORAL ONCE
Qty: 0 | Refills: 0 | Status: COMPLETED | OUTPATIENT
Start: 2017-01-01 | End: 2017-01-01

## 2017-01-01 RX ORDER — SODIUM BICARBONATE 1 MEQ/ML
50 SYRINGE (ML) INTRAVENOUS ONCE
Qty: 0 | Refills: 0 | Status: COMPLETED | OUTPATIENT
Start: 2017-01-01 | End: 2017-01-01

## 2017-01-01 RX ORDER — ATORVASTATIN CALCIUM 80 MG/1
20 TABLET, FILM COATED ORAL AT BEDTIME
Qty: 0 | Refills: 0 | Status: DISCONTINUED | OUTPATIENT
Start: 2017-01-01 | End: 2017-01-01

## 2017-01-01 RX ORDER — HYDROCORTISONE 20 MG
100 TABLET ORAL ONCE
Qty: 0 | Refills: 0 | Status: COMPLETED | OUTPATIENT
Start: 2017-01-01 | End: 2017-01-01

## 2017-01-01 RX ORDER — ATROPINE SULFATE 0.1 MG/ML
0.5 SYRINGE (ML) INJECTION ONCE
Qty: 0 | Refills: 0 | Status: COMPLETED | OUTPATIENT
Start: 2017-01-01 | End: 2017-01-01

## 2017-01-01 RX ORDER — WARFARIN SODIUM 2.5 MG/1
5 TABLET ORAL ONCE
Qty: 0 | Refills: 0 | Status: COMPLETED | OUTPATIENT
Start: 2017-01-01 | End: 2017-01-01

## 2017-01-01 RX ORDER — DIPHENHYDRAMINE HCL 50 MG
25 CAPSULE ORAL ONCE
Qty: 0 | Refills: 0 | Status: COMPLETED | OUTPATIENT
Start: 2017-01-01 | End: 2017-01-01

## 2017-01-01 RX ORDER — FAMOTIDINE 10 MG/ML
20 INJECTION INTRAVENOUS AT BEDTIME
Qty: 0 | Refills: 0 | Status: DISCONTINUED | OUTPATIENT
Start: 2017-01-01 | End: 2017-01-01

## 2017-01-01 RX ORDER — METOPROLOL TARTRATE 50 MG
5 TABLET ORAL
Qty: 0 | Refills: 0 | Status: DISCONTINUED | OUTPATIENT
Start: 2017-01-01 | End: 2017-01-01

## 2017-01-01 RX ORDER — INSULIN LISPRO 100/ML
6 VIAL (ML) SUBCUTANEOUS
Qty: 0 | Refills: 0 | Status: DISCONTINUED | OUTPATIENT
Start: 2017-01-01 | End: 2017-01-01

## 2017-01-01 RX ORDER — INSULIN HUMAN 100 [IU]/ML
INJECTION, SOLUTION SUBCUTANEOUS EVERY 6 HOURS
Qty: 0 | Refills: 0 | Status: DISCONTINUED | OUTPATIENT
Start: 2017-01-01 | End: 2017-01-01

## 2017-01-01 RX ORDER — METOPROLOL TARTRATE 50 MG
2.5 TABLET ORAL ONCE
Qty: 0 | Refills: 0 | Status: COMPLETED | OUTPATIENT
Start: 2017-01-01 | End: 2017-01-01

## 2017-01-01 RX ORDER — PANTOPRAZOLE SODIUM 20 MG/1
40 TABLET, DELAYED RELEASE ORAL
Qty: 0 | Refills: 0 | Status: DISCONTINUED | OUTPATIENT
Start: 2017-01-01 | End: 2017-01-01

## 2017-01-01 RX ORDER — CHLORHEXIDINE GLUCONATE 213 G/1000ML
15 SOLUTION TOPICAL
Qty: 0 | Refills: 0 | Status: DISCONTINUED | OUTPATIENT
Start: 2017-01-01 | End: 2017-01-01

## 2017-01-01 RX ORDER — MORPHINE SULFATE 50 MG/1
0.5 CAPSULE, EXTENDED RELEASE ORAL ONCE
Qty: 0 | Refills: 0 | Status: DISCONTINUED | OUTPATIENT
Start: 2017-01-01 | End: 2017-01-01

## 2017-01-01 RX ORDER — INSULIN GLARGINE 100 [IU]/ML
13 INJECTION, SOLUTION SUBCUTANEOUS AT BEDTIME
Qty: 0 | Refills: 0 | Status: DISCONTINUED | OUTPATIENT
Start: 2017-01-01 | End: 2017-01-01

## 2017-01-01 RX ORDER — CALCIUM CHLORIDE
50 POWDER (GRAM) MISCELLANEOUS ONCE
Qty: 0 | Refills: 0 | Status: COMPLETED | OUTPATIENT
Start: 2017-01-01 | End: 2017-01-01

## 2017-01-01 RX ORDER — LIDOCAINE 4 G/100G
1 CREAM TOPICAL DAILY
Qty: 0 | Refills: 0 | Status: DISCONTINUED | OUTPATIENT
Start: 2017-01-01 | End: 2017-01-01

## 2017-01-01 RX ORDER — EPINEPHRINE 0.3 MG/.3ML
1 INJECTION INTRAMUSCULAR; SUBCUTANEOUS ONCE
Qty: 0 | Refills: 0 | Status: COMPLETED | OUTPATIENT
Start: 2017-01-01 | End: 2017-01-01

## 2017-01-01 RX ORDER — FUROSEMIDE 40 MG
40 TABLET ORAL EVERY 12 HOURS
Qty: 0 | Refills: 0 | Status: DISCONTINUED | OUTPATIENT
Start: 2017-01-01 | End: 2017-01-01

## 2017-01-01 RX ORDER — INSULIN LISPRO 100/ML
4 VIAL (ML) SUBCUTANEOUS ONCE
Qty: 0 | Refills: 0 | Status: COMPLETED | OUTPATIENT
Start: 2017-01-01 | End: 2017-01-01

## 2017-01-01 RX ORDER — ERYTHROPOIETIN 10000 [IU]/ML
10000 INJECTION, SOLUTION INTRAVENOUS; SUBCUTANEOUS ONCE
Qty: 0 | Refills: 0 | Status: COMPLETED | OUTPATIENT
Start: 2017-01-01 | End: 2017-01-01

## 2017-01-01 RX ORDER — FENTANYL CITRATE 50 UG/ML
50 INJECTION INTRAVENOUS ONCE
Qty: 0 | Refills: 0 | Status: DISCONTINUED | OUTPATIENT
Start: 2017-01-01 | End: 2017-01-01

## 2017-01-01 RX ORDER — INSULIN LISPRO 100/ML
VIAL (ML) SUBCUTANEOUS
Qty: 0 | Refills: 0 | Status: DISCONTINUED | OUTPATIENT
Start: 2017-01-01 | End: 2017-01-01

## 2017-01-01 RX ORDER — ACETAMINOPHEN 500 MG
650 TABLET ORAL EVERY 6 HOURS
Qty: 0 | Refills: 0 | Status: DISCONTINUED | OUTPATIENT
Start: 2017-01-01 | End: 2017-01-01

## 2017-01-01 RX ORDER — NOREPINEPHRINE BITARTRATE/D5W 8 MG/250ML
0.5 PLASTIC BAG, INJECTION (ML) INTRAVENOUS
Qty: 16 | Refills: 0 | Status: DISCONTINUED | OUTPATIENT
Start: 2017-01-01 | End: 2017-01-01

## 2017-01-01 RX ORDER — FUROSEMIDE 40 MG
80 TABLET ORAL
Qty: 0 | Refills: 0 | Status: COMPLETED | OUTPATIENT
Start: 2017-01-01 | End: 2017-01-01

## 2017-01-01 RX ORDER — GLYCERIN ADULT
1 SUPPOSITORY, RECTAL RECTAL ONCE
Qty: 0 | Refills: 0 | Status: COMPLETED | OUTPATIENT
Start: 2017-01-01 | End: 2017-01-01

## 2017-01-01 RX ORDER — HYDROMORPHONE HYDROCHLORIDE 2 MG/ML
0.25 INJECTION INTRAMUSCULAR; INTRAVENOUS; SUBCUTANEOUS EVERY 4 HOURS
Qty: 0 | Refills: 0 | Status: DISCONTINUED | OUTPATIENT
Start: 2017-01-01 | End: 2017-01-01

## 2017-01-01 RX ORDER — ALBUTEROL 90 UG/1
2.5 AEROSOL, METERED ORAL
Qty: 0 | Refills: 0 | Status: DISCONTINUED | OUTPATIENT
Start: 2017-01-01 | End: 2017-01-01

## 2017-01-01 RX ORDER — FAMOTIDINE 10 MG/ML
20 INJECTION INTRAVENOUS ONCE
Qty: 0 | Refills: 0 | Status: COMPLETED | OUTPATIENT
Start: 2017-01-01 | End: 2017-01-01

## 2017-01-01 RX ORDER — DOBUTAMINE HCL 250MG/20ML
2 VIAL (ML) INTRAVENOUS
Qty: 500 | Refills: 0 | Status: DISCONTINUED | OUTPATIENT
Start: 2017-01-01 | End: 2017-01-01

## 2017-01-01 RX ORDER — AZITHROMYCIN 500 MG/1
500 TABLET, FILM COATED ORAL ONCE
Qty: 0 | Refills: 0 | Status: COMPLETED | OUTPATIENT
Start: 2017-01-01 | End: 2017-01-01

## 2017-01-01 RX ORDER — ACETYLCYSTEINE 200 MG/ML
1200 VIAL (ML) MISCELLANEOUS
Qty: 0 | Refills: 0 | Status: COMPLETED | OUTPATIENT
Start: 2017-01-01 | End: 2017-01-01

## 2017-01-01 RX ORDER — CARVEDILOL PHOSPHATE 80 MG/1
12.5 CAPSULE, EXTENDED RELEASE ORAL EVERY 12 HOURS
Qty: 0 | Refills: 0 | Status: DISCONTINUED | OUTPATIENT
Start: 2017-01-01 | End: 2017-01-01

## 2017-01-01 RX ORDER — INSULIN GLARGINE 100 [IU]/ML
5 INJECTION, SOLUTION SUBCUTANEOUS AT BEDTIME
Qty: 0 | Refills: 0 | Status: DISCONTINUED | OUTPATIENT
Start: 2017-01-01 | End: 2017-01-01

## 2017-01-01 RX ORDER — PROPOFOL 10 MG/ML
30 INJECTION, EMULSION INTRAVENOUS ONCE
Qty: 0 | Refills: 0 | Status: COMPLETED | OUTPATIENT
Start: 2017-01-01 | End: 2017-01-01

## 2017-01-01 RX ORDER — INSULIN GLARGINE 100 [IU]/ML
18 INJECTION, SOLUTION SUBCUTANEOUS AT BEDTIME
Qty: 0 | Refills: 0 | Status: DISCONTINUED | OUTPATIENT
Start: 2017-01-01 | End: 2017-01-01

## 2017-01-01 RX ORDER — DEXTROSE 50 % IN WATER 50 %
25 SYRINGE (ML) INTRAVENOUS ONCE
Qty: 0 | Refills: 0 | Status: DISCONTINUED | OUTPATIENT
Start: 2017-01-01 | End: 2017-01-01

## 2017-01-01 RX ORDER — HEPARIN SODIUM 5000 [USP'U]/ML
5000 INJECTION INTRAVENOUS; SUBCUTANEOUS EVERY 12 HOURS
Qty: 0 | Refills: 0 | Status: DISCONTINUED | OUTPATIENT
Start: 2017-01-01 | End: 2017-01-01

## 2017-01-01 RX ORDER — ONDANSETRON 8 MG/1
4 TABLET, FILM COATED ORAL ONCE
Qty: 0 | Refills: 0 | Status: COMPLETED | OUTPATIENT
Start: 2017-01-01 | End: 2017-01-01

## 2017-01-01 RX ORDER — NOREPINEPHRINE BITARTRATE/D5W 8 MG/250ML
0.1 PLASTIC BAG, INJECTION (ML) INTRAVENOUS
Qty: 8 | Refills: 0 | Status: DISCONTINUED | OUTPATIENT
Start: 2017-01-01 | End: 2017-01-01

## 2017-01-01 RX ORDER — ASPIRIN/CALCIUM CARB/MAGNESIUM 324 MG
81 TABLET ORAL DAILY
Qty: 0 | Refills: 0 | Status: DISCONTINUED | OUTPATIENT
Start: 2017-01-01 | End: 2017-01-01

## 2017-01-01 RX ORDER — CEFTRIAXONE 500 MG/1
1 INJECTION, POWDER, FOR SOLUTION INTRAMUSCULAR; INTRAVENOUS ONCE
Qty: 0 | Refills: 0 | Status: COMPLETED | OUTPATIENT
Start: 2017-01-01 | End: 2017-01-01

## 2017-01-01 RX ORDER — INSULIN GLARGINE 100 [IU]/ML
8 INJECTION, SOLUTION SUBCUTANEOUS AT BEDTIME
Qty: 0 | Refills: 0 | Status: DISCONTINUED | OUTPATIENT
Start: 2017-01-01 | End: 2017-01-01

## 2017-01-01 RX ORDER — CEFTRIAXONE 500 MG/1
1 INJECTION, POWDER, FOR SOLUTION INTRAMUSCULAR; INTRAVENOUS EVERY 24 HOURS
Qty: 0 | Refills: 0 | Status: DISCONTINUED | OUTPATIENT
Start: 2017-01-01 | End: 2017-01-01

## 2017-01-01 RX ORDER — INSULIN DETEMIR 100/ML (3)
5 INSULIN PEN (ML) SUBCUTANEOUS AT BEDTIME
Qty: 0 | Refills: 0 | Status: DISCONTINUED | OUTPATIENT
Start: 2017-01-01 | End: 2017-01-01

## 2017-01-01 RX ORDER — LANOLIN ALCOHOL/MO/W.PET/CERES
3 CREAM (GRAM) TOPICAL AT BEDTIME
Qty: 0 | Refills: 0 | Status: DISCONTINUED | OUTPATIENT
Start: 2017-01-01 | End: 2017-01-01

## 2017-01-01 RX ORDER — DOCUSATE SODIUM 100 MG
100 CAPSULE ORAL THREE TIMES A DAY
Qty: 0 | Refills: 0 | Status: DISCONTINUED | OUTPATIENT
Start: 2017-01-01 | End: 2017-01-01

## 2017-01-01 RX ORDER — NITROGLYCERIN 6.5 MG
1 CAPSULE, EXTENDED RELEASE ORAL DAILY
Qty: 0 | Refills: 0 | Status: DISCONTINUED | OUTPATIENT
Start: 2017-01-01 | End: 2017-01-01

## 2017-01-01 RX ORDER — ISOSORBIDE MONONITRATE 60 MG/1
30 TABLET, EXTENDED RELEASE ORAL DAILY
Qty: 0 | Refills: 0 | Status: DISCONTINUED | OUTPATIENT
Start: 2017-01-01 | End: 2017-01-01

## 2017-01-01 RX ORDER — WARFARIN SODIUM 2.5 MG/1
2.5 TABLET ORAL
Qty: 0 | Refills: 0 | Status: DISCONTINUED | OUTPATIENT
Start: 2017-01-01 | End: 2017-01-01

## 2017-01-01 RX ORDER — ONDANSETRON 8 MG/1
4 TABLET, FILM COATED ORAL EVERY 6 HOURS
Qty: 0 | Refills: 0 | Status: COMPLETED | OUTPATIENT
Start: 2017-01-01 | End: 2017-01-01

## 2017-01-01 RX ORDER — ALBUMIN HUMAN 25 %
50 VIAL (ML) INTRAVENOUS EVERY 12 HOURS
Qty: 0 | Refills: 0 | Status: COMPLETED | OUTPATIENT
Start: 2017-01-01 | End: 2017-01-01

## 2017-01-01 RX ORDER — SODIUM CHLORIDE 9 MG/ML
3 INJECTION INTRAMUSCULAR; INTRAVENOUS; SUBCUTANEOUS ONCE
Qty: 0 | Refills: 0 | Status: COMPLETED | OUTPATIENT
Start: 2017-01-01 | End: 2017-01-01

## 2017-01-01 RX ORDER — POLYETHYLENE GLYCOL 3350 17 G/17G
17 POWDER, FOR SOLUTION ORAL ONCE
Qty: 0 | Refills: 0 | Status: COMPLETED | OUTPATIENT
Start: 2017-01-01 | End: 2017-01-01

## 2017-01-01 RX ORDER — MANNITOL
12.5 POWDER (GRAM) MISCELLANEOUS ONCE
Qty: 0 | Refills: 0 | Status: COMPLETED | OUTPATIENT
Start: 2017-01-01 | End: 2017-01-01

## 2017-01-01 RX ORDER — LIDOCAINE 4 G/100G
1 CREAM TOPICAL
Qty: 0 | Refills: 0 | Status: DISCONTINUED | OUTPATIENT
Start: 2017-01-01 | End: 2017-01-01

## 2017-01-01 RX ORDER — MORPHINE SULFATE 50 MG/1
2 CAPSULE, EXTENDED RELEASE ORAL ONCE
Qty: 0 | Refills: 0 | Status: DISCONTINUED | OUTPATIENT
Start: 2017-01-01 | End: 2017-01-01

## 2017-01-01 RX ORDER — ERYTHROPOIETIN 10000 [IU]/ML
10000 INJECTION, SOLUTION INTRAVENOUS; SUBCUTANEOUS
Qty: 0 | Refills: 0 | Status: DISCONTINUED | OUTPATIENT
Start: 2017-01-01 | End: 2017-01-01

## 2017-01-01 RX ORDER — INSULIN LISPRO 100/ML
2 VIAL (ML) SUBCUTANEOUS ONCE
Qty: 0 | Refills: 0 | Status: COMPLETED | OUTPATIENT
Start: 2017-01-01 | End: 2017-01-01

## 2017-01-01 RX ORDER — PANTOPRAZOLE SODIUM 20 MG/1
40 TABLET, DELAYED RELEASE ORAL DAILY
Qty: 0 | Refills: 0 | Status: DISCONTINUED | OUTPATIENT
Start: 2017-01-01 | End: 2017-01-01

## 2017-01-01 RX ORDER — AZITHROMYCIN 500 MG/1
500 TABLET, FILM COATED ORAL EVERY 24 HOURS
Qty: 0 | Refills: 0 | Status: DISCONTINUED | OUTPATIENT
Start: 2017-01-01 | End: 2017-01-01

## 2017-01-01 RX ORDER — INSULIN LISPRO 100/ML
3 VIAL (ML) SUBCUTANEOUS
Qty: 0 | Refills: 0 | Status: DISCONTINUED | OUTPATIENT
Start: 2017-01-01 | End: 2017-01-01

## 2017-01-01 RX ORDER — FENTANYL CITRATE 50 UG/ML
100 INJECTION INTRAVENOUS ONCE
Qty: 0 | Refills: 0 | Status: DISCONTINUED | OUTPATIENT
Start: 2017-01-01 | End: 2017-01-01

## 2017-01-01 RX ORDER — CEFTRIAXONE 500 MG/1
INJECTION, POWDER, FOR SOLUTION INTRAMUSCULAR; INTRAVENOUS
Qty: 0 | Refills: 0 | Status: DISCONTINUED | OUTPATIENT
Start: 2017-01-01 | End: 2017-01-01

## 2017-01-01 RX ORDER — FUROSEMIDE 40 MG
40 TABLET ORAL ONCE
Qty: 0 | Refills: 0 | Status: COMPLETED | OUTPATIENT
Start: 2017-01-01 | End: 2017-01-01

## 2017-01-01 RX ORDER — POLYETHYLENE GLYCOL 3350 17 G/17G
17 POWDER, FOR SOLUTION ORAL AT BEDTIME
Qty: 0 | Refills: 0 | Status: DISCONTINUED | OUTPATIENT
Start: 2017-01-01 | End: 2017-01-01

## 2017-01-01 RX ORDER — ERYTHROPOIETIN 10000 [IU]/ML
15000 INJECTION, SOLUTION INTRAVENOUS; SUBCUTANEOUS
Qty: 0 | Refills: 0 | Status: DISCONTINUED | OUTPATIENT
Start: 2017-01-01 | End: 2017-01-01

## 2017-01-01 RX ORDER — PROPOFOL 10 MG/ML
20 INJECTION, EMULSION INTRAVENOUS
Qty: 500 | Refills: 0 | Status: DISCONTINUED | OUTPATIENT
Start: 2017-01-01 | End: 2017-01-01

## 2017-01-01 RX ORDER — FUROSEMIDE 40 MG
80 TABLET ORAL DAILY
Qty: 0 | Refills: 0 | Status: DISCONTINUED | OUTPATIENT
Start: 2017-01-01 | End: 2017-01-01

## 2017-01-01 RX ORDER — WARFARIN SODIUM 2.5 MG/1
4 TABLET ORAL
Qty: 0 | Refills: 0 | Status: COMPLETED | OUTPATIENT
Start: 2017-01-01 | End: 2017-01-01

## 2017-01-01 RX ADMIN — Medication 650 MILLIGRAM(S): at 11:49

## 2017-01-01 RX ADMIN — FENTANYL CITRATE 50 MICROGRAM(S): 50 INJECTION INTRAVENOUS at 01:40

## 2017-01-01 RX ADMIN — Medication 1000 MILLIGRAM(S): at 04:10

## 2017-01-01 RX ADMIN — Medication 5: at 13:13

## 2017-01-01 RX ADMIN — HYDROMORPHONE HYDROCHLORIDE 0.25 MILLIGRAM(S): 2 INJECTION INTRAMUSCULAR; INTRAVENOUS; SUBCUTANEOUS at 14:46

## 2017-01-01 RX ADMIN — WARFARIN SODIUM 2.5 MILLIGRAM(S): 2.5 TABLET ORAL at 21:50

## 2017-01-01 RX ADMIN — Medication 3.54 MICROGRAM(S)/KG/MIN: at 10:55

## 2017-01-01 RX ADMIN — FENTANYL CITRATE 50 MICROGRAM(S): 50 INJECTION INTRAVENOUS at 23:00

## 2017-01-01 RX ADMIN — Medication 25 MILLIGRAM(S): at 05:10

## 2017-01-01 RX ADMIN — Medication 100 MILLIGRAM(S): at 09:05

## 2017-01-01 RX ADMIN — Medication 100 MILLIGRAM(S): at 13:12

## 2017-01-01 RX ADMIN — Medication 100 MILLIGRAM(S): at 22:30

## 2017-01-01 RX ADMIN — LIDOCAINE 1 PATCH: 4 CREAM TOPICAL at 11:59

## 2017-01-01 RX ADMIN — FAMOTIDINE 20 MILLIGRAM(S): 10 INJECTION INTRAVENOUS at 22:23

## 2017-01-01 RX ADMIN — FENTANYL CITRATE 50 MICROGRAM(S): 50 INJECTION INTRAVENOUS at 06:02

## 2017-01-01 RX ADMIN — PANTOPRAZOLE SODIUM 40 MILLIGRAM(S): 20 TABLET, DELAYED RELEASE ORAL at 05:13

## 2017-01-01 RX ADMIN — Medication 100 MILLIGRAM(S): at 21:51

## 2017-01-01 RX ADMIN — Medication 8: at 22:12

## 2017-01-01 RX ADMIN — POLYETHYLENE GLYCOL 3350 17 GRAM(S): 17 POWDER, FOR SOLUTION ORAL at 13:12

## 2017-01-01 RX ADMIN — SENNA PLUS 2 TABLET(S): 8.6 TABLET ORAL at 23:20

## 2017-01-01 RX ADMIN — Medication 100 MILLIGRAM(S): at 18:28

## 2017-01-01 RX ADMIN — Medication 100 MILLIGRAM(S): at 22:05

## 2017-01-01 RX ADMIN — Medication 4: at 08:41

## 2017-01-01 RX ADMIN — INSULIN GLARGINE 10 UNIT(S): 100 INJECTION, SOLUTION SUBCUTANEOUS at 22:28

## 2017-01-01 RX ADMIN — ATORVASTATIN CALCIUM 20 MILLIGRAM(S): 80 TABLET, FILM COATED ORAL at 21:06

## 2017-01-01 RX ADMIN — Medication 4: at 12:42

## 2017-01-01 RX ADMIN — FAMOTIDINE 20 MILLIGRAM(S): 10 INJECTION INTRAVENOUS at 23:33

## 2017-01-01 RX ADMIN — LIDOCAINE 1 PATCH: 4 CREAM TOPICAL at 00:07

## 2017-01-01 RX ADMIN — PANTOPRAZOLE SODIUM 40 MILLIGRAM(S): 20 TABLET, DELAYED RELEASE ORAL at 05:26

## 2017-01-01 RX ADMIN — Medication 8 UNIT(S): at 18:14

## 2017-01-01 RX ADMIN — Medication 100 MILLIGRAM(S): at 10:48

## 2017-01-01 RX ADMIN — LIDOCAINE 1 PATCH: 4 CREAM TOPICAL at 11:41

## 2017-01-01 RX ADMIN — Medication 81 MILLIGRAM(S): at 12:06

## 2017-01-01 RX ADMIN — PANTOPRAZOLE SODIUM 40 MILLIGRAM(S): 20 TABLET, DELAYED RELEASE ORAL at 05:41

## 2017-01-01 RX ADMIN — ATORVASTATIN CALCIUM 20 MILLIGRAM(S): 80 TABLET, FILM COATED ORAL at 22:12

## 2017-01-01 RX ADMIN — Medication 100 MILLIGRAM(S): at 23:10

## 2017-01-01 RX ADMIN — Medication 8 UNIT(S): at 09:22

## 2017-01-01 RX ADMIN — AZITHROMYCIN 255 MILLIGRAM(S): 500 TABLET, FILM COATED ORAL at 11:40

## 2017-01-01 RX ADMIN — INSULIN GLARGINE 10 UNIT(S): 100 INJECTION, SOLUTION SUBCUTANEOUS at 23:11

## 2017-01-01 RX ADMIN — Medication 100 MILLIGRAM(S): at 17:11

## 2017-01-01 RX ADMIN — HEPARIN SODIUM 5000 UNIT(S): 5000 INJECTION INTRAVENOUS; SUBCUTANEOUS at 22:17

## 2017-01-01 RX ADMIN — Medication 8 UNIT(S): at 11:49

## 2017-01-01 RX ADMIN — Medication 50 MILLIEQUIVALENT(S): at 08:20

## 2017-01-01 RX ADMIN — Medication 100 MILLIGRAM(S): at 05:00

## 2017-01-01 RX ADMIN — Medication 650 MILLIGRAM(S): at 23:12

## 2017-01-01 RX ADMIN — HYDROMORPHONE HYDROCHLORIDE 0.25 MILLIGRAM(S): 2 INJECTION INTRAMUSCULAR; INTRAVENOUS; SUBCUTANEOUS at 06:10

## 2017-01-01 RX ADMIN — Medication 3 MILLILITER(S): at 03:30

## 2017-01-01 RX ADMIN — Medication 100 MILLIGRAM(S): at 18:13

## 2017-01-01 RX ADMIN — Medication 7: at 13:02

## 2017-01-01 RX ADMIN — Medication 81 MILLIGRAM(S): at 21:17

## 2017-01-01 RX ADMIN — HEPARIN SODIUM 5000 UNIT(S): 5000 INJECTION INTRAVENOUS; SUBCUTANEOUS at 22:23

## 2017-01-01 RX ADMIN — Medication 100 MILLIGRAM(S): at 13:44

## 2017-01-01 RX ADMIN — Medication 100 MILLIGRAM(S): at 06:23

## 2017-01-01 RX ADMIN — Medication 81 MILLIGRAM(S): at 12:42

## 2017-01-01 RX ADMIN — Medication 30 MILLILITER(S): at 16:00

## 2017-01-01 RX ADMIN — Medication 100 MILLIGRAM(S): at 05:49

## 2017-01-01 RX ADMIN — Medication 81 MILLIGRAM(S): at 13:51

## 2017-01-01 RX ADMIN — Medication 81 MILLIGRAM(S): at 12:33

## 2017-01-01 RX ADMIN — LIDOCAINE 1 PATCH: 4 CREAM TOPICAL at 23:28

## 2017-01-01 RX ADMIN — Medication 3 MILLILITER(S): at 14:52

## 2017-01-01 RX ADMIN — Medication 100 MILLIGRAM(S): at 06:01

## 2017-01-01 RX ADMIN — Medication 3 MILLILITER(S): at 03:09

## 2017-01-01 RX ADMIN — Medication 3 UNIT(S): at 09:06

## 2017-01-01 RX ADMIN — CARVEDILOL PHOSPHATE 6.25 MILLIGRAM(S): 80 CAPSULE, EXTENDED RELEASE ORAL at 20:14

## 2017-01-01 RX ADMIN — LIDOCAINE 1 PATCH: 4 CREAM TOPICAL at 13:44

## 2017-01-01 RX ADMIN — Medication 100 MILLIGRAM(S): at 05:13

## 2017-01-01 RX ADMIN — HEPARIN SODIUM 5000 UNIT(S): 5000 INJECTION INTRAVENOUS; SUBCUTANEOUS at 09:34

## 2017-01-01 RX ADMIN — Medication 25 MILLIGRAM(S): at 05:48

## 2017-01-01 RX ADMIN — Medication 1200 MILLIGRAM(S): at 05:12

## 2017-01-01 RX ADMIN — Medication 2: at 18:07

## 2017-01-01 RX ADMIN — Medication 10: at 17:34

## 2017-01-01 RX ADMIN — ERYTHROPOIETIN 15000 UNIT(S): 10000 INJECTION, SOLUTION INTRAVENOUS; SUBCUTANEOUS at 15:26

## 2017-01-01 RX ADMIN — Medication 40 MILLIGRAM(S): at 20:13

## 2017-01-01 RX ADMIN — Medication 3 MILLILITER(S): at 20:36

## 2017-01-01 RX ADMIN — Medication 1 PATCH: at 11:31

## 2017-01-01 RX ADMIN — Medication 5: at 21:51

## 2017-01-01 RX ADMIN — Medication 50 MILLILITER(S): at 06:00

## 2017-01-01 RX ADMIN — Medication 3 MILLILITER(S): at 08:20

## 2017-01-01 RX ADMIN — SENNA PLUS 2 TABLET(S): 8.6 TABLET ORAL at 22:04

## 2017-01-01 RX ADMIN — Medication 100 MILLIGRAM(S): at 18:21

## 2017-01-01 RX ADMIN — Medication 3 MILLILITER(S): at 08:26

## 2017-01-01 RX ADMIN — Medication 100 MILLIGRAM(S): at 17:31

## 2017-01-01 RX ADMIN — Medication 3 MILLILITER(S): at 04:08

## 2017-01-01 RX ADMIN — Medication 3 MILLILITER(S): at 15:45

## 2017-01-01 RX ADMIN — FAMOTIDINE 20 MILLIGRAM(S): 10 INJECTION INTRAVENOUS at 22:29

## 2017-01-01 RX ADMIN — Medication 3.54 MICROGRAM(S)/KG/MIN: at 17:23

## 2017-01-01 RX ADMIN — Medication 4: at 23:36

## 2017-01-01 RX ADMIN — Medication 81 MILLIGRAM(S): at 11:38

## 2017-01-01 RX ADMIN — Medication 40 MILLIGRAM(S): at 22:06

## 2017-01-01 RX ADMIN — AZITHROMYCIN 255 MILLIGRAM(S): 500 TABLET, FILM COATED ORAL at 11:35

## 2017-01-01 RX ADMIN — Medication 3 MILLILITER(S): at 20:27

## 2017-01-01 RX ADMIN — Medication 100 MILLIGRAM(S): at 06:44

## 2017-01-01 RX ADMIN — Medication 40 MILLIGRAM(S): at 09:05

## 2017-01-01 RX ADMIN — PANTOPRAZOLE SODIUM 40 MILLIGRAM(S): 20 TABLET, DELAYED RELEASE ORAL at 07:32

## 2017-01-01 RX ADMIN — SENNA PLUS 2 TABLET(S): 8.6 TABLET ORAL at 22:11

## 2017-01-01 RX ADMIN — Medication 40 MILLIGRAM(S): at 13:30

## 2017-01-01 RX ADMIN — HYDROMORPHONE HYDROCHLORIDE 0.25 MILLIGRAM(S): 2 INJECTION INTRAMUSCULAR; INTRAVENOUS; SUBCUTANEOUS at 00:10

## 2017-01-01 RX ADMIN — PROPOFOL 7.08 MICROGRAM(S)/KG/MIN: 10 INJECTION, EMULSION INTRAVENOUS at 21:16

## 2017-01-01 RX ADMIN — ATORVASTATIN CALCIUM 20 MILLIGRAM(S): 80 TABLET, FILM COATED ORAL at 22:05

## 2017-01-01 RX ADMIN — Medication 4: at 11:32

## 2017-01-01 RX ADMIN — WARFARIN SODIUM 2.5 MILLIGRAM(S): 2.5 TABLET ORAL at 22:28

## 2017-01-01 RX ADMIN — HEPARIN SODIUM 5000 UNIT(S): 5000 INJECTION INTRAVENOUS; SUBCUTANEOUS at 22:11

## 2017-01-01 RX ADMIN — LIDOCAINE 1 PATCH: 4 CREAM TOPICAL at 13:43

## 2017-01-01 RX ADMIN — Medication 100 MILLIGRAM(S): at 06:10

## 2017-01-01 RX ADMIN — Medication 1200 MILLIGRAM(S): at 07:55

## 2017-01-01 RX ADMIN — Medication 3 MILLILITER(S): at 15:29

## 2017-01-01 RX ADMIN — Medication 3 MILLILITER(S): at 20:31

## 2017-01-01 RX ADMIN — Medication 3 MILLILITER(S): at 15:05

## 2017-01-01 RX ADMIN — Medication 100 MILLIGRAM(S): at 09:54

## 2017-01-01 RX ADMIN — PANTOPRAZOLE SODIUM 40 MILLIGRAM(S): 20 TABLET, DELAYED RELEASE ORAL at 05:39

## 2017-01-01 RX ADMIN — ERYTHROPOIETIN 10000 UNIT(S): 10000 INJECTION, SOLUTION INTRAVENOUS; SUBCUTANEOUS at 20:09

## 2017-01-01 RX ADMIN — ENOXAPARIN SODIUM 70 MILLIGRAM(S): 100 INJECTION SUBCUTANEOUS at 11:26

## 2017-01-01 RX ADMIN — Medication 100 MILLIGRAM(S): at 05:41

## 2017-01-01 RX ADMIN — Medication 40 MILLIGRAM(S): at 18:18

## 2017-01-01 RX ADMIN — CARVEDILOL PHOSPHATE 6.25 MILLIGRAM(S): 80 CAPSULE, EXTENDED RELEASE ORAL at 18:26

## 2017-01-01 RX ADMIN — Medication 3 MILLILITER(S): at 20:23

## 2017-01-01 RX ADMIN — Medication 100 MILLIGRAM(S): at 15:48

## 2017-01-01 RX ADMIN — LIDOCAINE 1 PATCH: 4 CREAM TOPICAL at 22:37

## 2017-01-01 RX ADMIN — Medication 30 MILLIGRAM(S): at 05:12

## 2017-01-01 RX ADMIN — Medication 650 MILLIGRAM(S): at 06:00

## 2017-01-01 RX ADMIN — Medication 650 MILLIGRAM(S): at 11:31

## 2017-01-01 RX ADMIN — CARVEDILOL PHOSPHATE 12.5 MILLIGRAM(S): 80 CAPSULE, EXTENDED RELEASE ORAL at 05:13

## 2017-01-01 RX ADMIN — FENTANYL CITRATE 50 MICROGRAM(S): 50 INJECTION INTRAVENOUS at 17:31

## 2017-01-01 RX ADMIN — Medication 100 MILLIGRAM(S): at 22:40

## 2017-01-01 RX ADMIN — ERYTHROPOIETIN 10000 UNIT(S): 10000 INJECTION, SOLUTION INTRAVENOUS; SUBCUTANEOUS at 18:23

## 2017-01-01 RX ADMIN — Medication 40 MILLIGRAM(S): at 17:16

## 2017-01-01 RX ADMIN — CARVEDILOL PHOSPHATE 6.25 MILLIGRAM(S): 80 CAPSULE, EXTENDED RELEASE ORAL at 06:36

## 2017-01-01 RX ADMIN — Medication 7: at 08:56

## 2017-01-01 RX ADMIN — ONDANSETRON 4 MILLIGRAM(S): 8 TABLET, FILM COATED ORAL at 04:38

## 2017-01-01 RX ADMIN — Medication 30 MILLILITER(S): at 20:01

## 2017-01-01 RX ADMIN — Medication 3 MILLILITER(S): at 15:21

## 2017-01-01 RX ADMIN — HYDROMORPHONE HYDROCHLORIDE 0.25 MILLIGRAM(S): 2 INJECTION INTRAMUSCULAR; INTRAVENOUS; SUBCUTANEOUS at 17:20

## 2017-01-01 RX ADMIN — Medication 650 MILLIGRAM(S): at 08:55

## 2017-01-01 RX ADMIN — Medication 4: at 09:22

## 2017-01-01 RX ADMIN — Medication 8: at 22:25

## 2017-01-01 RX ADMIN — SENNA PLUS 2 TABLET(S): 8.6 TABLET ORAL at 22:28

## 2017-01-01 RX ADMIN — Medication 3 MILLILITER(S): at 21:21

## 2017-01-01 RX ADMIN — FENTANYL CITRATE 50 MICROGRAM(S): 50 INJECTION INTRAVENOUS at 05:47

## 2017-01-01 RX ADMIN — Medication 8: at 18:18

## 2017-01-01 RX ADMIN — Medication 100 MILLIGRAM(S): at 21:41

## 2017-01-01 RX ADMIN — Medication 100 MILLIGRAM(S): at 13:39

## 2017-01-01 RX ADMIN — Medication 50 MILLIGRAM(S): at 08:12

## 2017-01-01 RX ADMIN — PANTOPRAZOLE SODIUM 40 MILLIGRAM(S): 20 TABLET, DELAYED RELEASE ORAL at 06:36

## 2017-01-01 RX ADMIN — Medication 650 MILLIGRAM(S): at 22:47

## 2017-01-01 RX ADMIN — Medication 4: at 22:54

## 2017-01-01 RX ADMIN — Medication 3 MILLILITER(S): at 03:40

## 2017-01-01 RX ADMIN — HEPARIN SODIUM 5000 UNIT(S): 5000 INJECTION INTRAVENOUS; SUBCUTANEOUS at 10:48

## 2017-01-01 RX ADMIN — Medication 100 MILLIGRAM(S): at 05:03

## 2017-01-01 RX ADMIN — HYDROMORPHONE HYDROCHLORIDE 0.25 MILLIGRAM(S): 2 INJECTION INTRAMUSCULAR; INTRAVENOUS; SUBCUTANEOUS at 03:54

## 2017-01-01 RX ADMIN — HEPARIN SODIUM 5000 UNIT(S): 5000 INJECTION INTRAVENOUS; SUBCUTANEOUS at 17:34

## 2017-01-01 RX ADMIN — ATORVASTATIN CALCIUM 20 MILLIGRAM(S): 80 TABLET, FILM COATED ORAL at 22:04

## 2017-01-01 RX ADMIN — HEPARIN SODIUM 5000 UNIT(S): 5000 INJECTION INTRAVENOUS; SUBCUTANEOUS at 08:48

## 2017-01-01 RX ADMIN — MORPHINE SULFATE 0.5 MILLIGRAM(S): 50 CAPSULE, EXTENDED RELEASE ORAL at 22:29

## 2017-01-01 RX ADMIN — HYDROMORPHONE HYDROCHLORIDE 0.25 MILLIGRAM(S): 2 INJECTION INTRAMUSCULAR; INTRAVENOUS; SUBCUTANEOUS at 16:19

## 2017-01-01 RX ADMIN — FAMOTIDINE 20 MILLIGRAM(S): 10 INJECTION INTRAVENOUS at 22:11

## 2017-01-01 RX ADMIN — Medication 100 MILLIGRAM(S): at 13:14

## 2017-01-01 RX ADMIN — LIDOCAINE 1 PATCH: 4 CREAM TOPICAL at 16:31

## 2017-01-01 RX ADMIN — Medication 100 MILLIGRAM(S): at 09:34

## 2017-01-01 RX ADMIN — SODIUM CHLORIDE 3 MILLILITER(S): 9 INJECTION INTRAMUSCULAR; INTRAVENOUS; SUBCUTANEOUS at 08:05

## 2017-01-01 RX ADMIN — Medication 100 MILLIGRAM(S): at 01:46

## 2017-01-01 RX ADMIN — Medication 30 MILLILITER(S): at 02:06

## 2017-01-01 RX ADMIN — Medication 81 MILLIGRAM(S): at 12:26

## 2017-01-01 RX ADMIN — Medication 100 MILLIGRAM(S): at 13:27

## 2017-01-01 RX ADMIN — Medication 100 MILLIGRAM(S): at 05:28

## 2017-01-01 RX ADMIN — LIDOCAINE 1 PATCH: 4 CREAM TOPICAL at 12:42

## 2017-01-01 RX ADMIN — Medication 81 MILLIGRAM(S): at 11:32

## 2017-01-01 RX ADMIN — FAMOTIDINE 20 MILLIGRAM(S): 10 INJECTION INTRAVENOUS at 22:05

## 2017-01-01 RX ADMIN — ISOSORBIDE MONONITRATE 30 MILLIGRAM(S): 60 TABLET, EXTENDED RELEASE ORAL at 11:27

## 2017-01-01 RX ADMIN — Medication 10 MILLIGRAM(S): at 12:36

## 2017-01-01 RX ADMIN — AZITHROMYCIN 255 MILLIGRAM(S): 500 TABLET, FILM COATED ORAL at 12:00

## 2017-01-01 RX ADMIN — FAMOTIDINE 20 MILLIGRAM(S): 10 INJECTION INTRAVENOUS at 22:28

## 2017-01-01 RX ADMIN — Medication 4: at 12:33

## 2017-01-01 RX ADMIN — Medication 100 MILLIGRAM(S): at 22:29

## 2017-01-01 RX ADMIN — CHLORHEXIDINE GLUCONATE 15 MILLILITER(S): 213 SOLUTION TOPICAL at 06:29

## 2017-01-01 RX ADMIN — Medication 50 GRAM(S): at 17:12

## 2017-01-01 RX ADMIN — FENTANYL CITRATE 50 MICROGRAM(S): 50 INJECTION INTRAVENOUS at 01:15

## 2017-01-01 RX ADMIN — HYDROMORPHONE HYDROCHLORIDE 0.25 MILLIGRAM(S): 2 INJECTION INTRAMUSCULAR; INTRAVENOUS; SUBCUTANEOUS at 23:33

## 2017-01-01 RX ADMIN — Medication 3 MILLILITER(S): at 03:25

## 2017-01-01 RX ADMIN — Medication 100 MILLIGRAM(S): at 05:39

## 2017-01-01 RX ADMIN — SODIUM CHLORIDE 75 MILLILITER(S): 9 INJECTION INTRAMUSCULAR; INTRAVENOUS; SUBCUTANEOUS at 23:09

## 2017-01-01 RX ADMIN — Medication 650 MILLIGRAM(S): at 00:24

## 2017-01-01 RX ADMIN — Medication 650 MILLIGRAM(S): at 13:00

## 2017-01-01 RX ADMIN — Medication 100 MILLIGRAM(S): at 18:23

## 2017-01-01 RX ADMIN — LIDOCAINE 1 PATCH: 4 CREAM TOPICAL at 22:10

## 2017-01-01 RX ADMIN — Medication 8 UNIT(S): at 09:17

## 2017-01-01 RX ADMIN — Medication 100 MILLIGRAM(S): at 02:21

## 2017-01-01 RX ADMIN — Medication 25 MILLIGRAM(S): at 22:05

## 2017-01-01 RX ADMIN — Medication 25 MILLIGRAM(S): at 05:13

## 2017-01-01 RX ADMIN — PANTOPRAZOLE SODIUM 40 MILLIGRAM(S): 20 TABLET, DELAYED RELEASE ORAL at 06:01

## 2017-01-01 RX ADMIN — Medication 3.54 MICROGRAM(S)/KG/MIN: at 21:17

## 2017-01-01 RX ADMIN — Medication 100 MILLIGRAM(S): at 06:36

## 2017-01-01 RX ADMIN — Medication 4: at 12:26

## 2017-01-01 RX ADMIN — FAMOTIDINE 20 MILLIGRAM(S): 10 INJECTION INTRAVENOUS at 21:50

## 2017-01-01 RX ADMIN — Medication 650 MILLIGRAM(S): at 08:30

## 2017-01-01 RX ADMIN — PANTOPRAZOLE SODIUM 40 MILLIGRAM(S): 20 TABLET, DELAYED RELEASE ORAL at 12:33

## 2017-01-01 RX ADMIN — ENOXAPARIN SODIUM 70 MILLIGRAM(S): 100 INJECTION SUBCUTANEOUS at 13:49

## 2017-01-01 RX ADMIN — HYDROMORPHONE HYDROCHLORIDE 0.25 MILLIGRAM(S): 2 INJECTION INTRAMUSCULAR; INTRAVENOUS; SUBCUTANEOUS at 22:45

## 2017-01-01 RX ADMIN — SENNA PLUS 2 TABLET(S): 8.6 TABLET ORAL at 22:42

## 2017-01-01 RX ADMIN — Medication 650 MILLIGRAM(S): at 12:29

## 2017-01-01 RX ADMIN — Medication 3 MILLILITER(S): at 15:34

## 2017-01-01 RX ADMIN — INSULIN GLARGINE 10 UNIT(S): 100 INJECTION, SOLUTION SUBCUTANEOUS at 23:46

## 2017-01-01 RX ADMIN — Medication 8: at 23:20

## 2017-01-01 RX ADMIN — Medication 10 MILLIGRAM(S): at 06:08

## 2017-01-01 RX ADMIN — Medication 100 MILLIGRAM(S): at 06:12

## 2017-01-01 RX ADMIN — PANTOPRAZOLE SODIUM 40 MILLIGRAM(S): 20 TABLET, DELAYED RELEASE ORAL at 05:25

## 2017-01-01 RX ADMIN — Medication 3 MILLILITER(S): at 15:18

## 2017-01-01 RX ADMIN — LIDOCAINE 1 PATCH: 4 CREAM TOPICAL at 02:22

## 2017-01-01 RX ADMIN — ATORVASTATIN CALCIUM 20 MILLIGRAM(S): 80 TABLET, FILM COATED ORAL at 21:17

## 2017-01-01 RX ADMIN — CARVEDILOL PHOSPHATE 6.25 MILLIGRAM(S): 80 CAPSULE, EXTENDED RELEASE ORAL at 17:56

## 2017-01-01 RX ADMIN — Medication 0.5 MILLIGRAM(S): at 08:05

## 2017-01-01 RX ADMIN — LACTULOSE 20 GRAM(S): 10 SOLUTION ORAL at 14:04

## 2017-01-01 RX ADMIN — CEFTRIAXONE 100 GRAM(S): 500 INJECTION, POWDER, FOR SOLUTION INTRAMUSCULAR; INTRAVENOUS at 12:25

## 2017-01-01 RX ADMIN — INSULIN GLARGINE 15 UNIT(S): 100 INJECTION, SOLUTION SUBCUTANEOUS at 22:14

## 2017-01-01 RX ADMIN — Medication 4: at 17:40

## 2017-01-01 RX ADMIN — ONDANSETRON 4 MILLIGRAM(S): 8 TABLET, FILM COATED ORAL at 21:20

## 2017-01-01 RX ADMIN — CEFTRIAXONE 100 GRAM(S): 500 INJECTION, POWDER, FOR SOLUTION INTRAMUSCULAR; INTRAVENOUS at 12:00

## 2017-01-01 RX ADMIN — Medication 80 MILLIGRAM(S): at 23:28

## 2017-01-01 RX ADMIN — Medication 3 MILLILITER(S): at 03:35

## 2017-01-01 RX ADMIN — SENNA PLUS 2 TABLET(S): 8.6 TABLET ORAL at 22:29

## 2017-01-01 RX ADMIN — Medication 3 UNIT(S): at 17:55

## 2017-01-01 RX ADMIN — CARVEDILOL PHOSPHATE 6.25 MILLIGRAM(S): 80 CAPSULE, EXTENDED RELEASE ORAL at 17:39

## 2017-01-01 RX ADMIN — Medication 3 UNIT(S): at 17:35

## 2017-01-01 RX ADMIN — ATORVASTATIN CALCIUM 20 MILLIGRAM(S): 80 TABLET, FILM COATED ORAL at 21:41

## 2017-01-01 RX ADMIN — LIDOCAINE 1 PATCH: 4 CREAM TOPICAL at 11:38

## 2017-01-01 RX ADMIN — Medication 80 MILLIGRAM(S): at 07:31

## 2017-01-01 RX ADMIN — SENNA PLUS 2 TABLET(S): 8.6 TABLET ORAL at 22:56

## 2017-01-01 RX ADMIN — HEPARIN SODIUM 5000 UNIT(S): 5000 INJECTION INTRAVENOUS; SUBCUTANEOUS at 09:39

## 2017-01-01 RX ADMIN — Medication 100 MILLIGRAM(S): at 22:23

## 2017-01-01 RX ADMIN — Medication 80 MILLIGRAM(S): at 05:41

## 2017-01-01 RX ADMIN — FENTANYL CITRATE 50 MICROGRAM(S): 50 INJECTION INTRAVENOUS at 04:45

## 2017-01-01 RX ADMIN — SODIUM CHLORIDE 75 MILLILITER(S): 9 INJECTION INTRAMUSCULAR; INTRAVENOUS; SUBCUTANEOUS at 04:51

## 2017-01-01 RX ADMIN — Medication 8 UNIT(S): at 08:40

## 2017-01-01 RX ADMIN — Medication 100 MILLIGRAM(S): at 21:29

## 2017-01-01 RX ADMIN — FENTANYL CITRATE 50 MICROGRAM(S): 50 INJECTION INTRAVENOUS at 08:06

## 2017-01-01 RX ADMIN — Medication 650 MILLIGRAM(S): at 14:33

## 2017-01-01 RX ADMIN — PANTOPRAZOLE SODIUM 40 MILLIGRAM(S): 20 TABLET, DELAYED RELEASE ORAL at 06:10

## 2017-01-01 RX ADMIN — Medication 8 UNIT(S): at 18:20

## 2017-01-01 RX ADMIN — Medication 3: at 06:29

## 2017-01-01 RX ADMIN — Medication 100 MILLIGRAM(S): at 08:48

## 2017-01-01 RX ADMIN — LIDOCAINE 1 PATCH: 4 CREAM TOPICAL at 22:26

## 2017-01-01 RX ADMIN — Medication 100 MILLIGRAM(S): at 14:17

## 2017-01-01 RX ADMIN — ATORVASTATIN CALCIUM 20 MILLIGRAM(S): 80 TABLET, FILM COATED ORAL at 22:40

## 2017-01-01 RX ADMIN — FENTANYL CITRATE 50 MICROGRAM(S): 50 INJECTION INTRAVENOUS at 01:30

## 2017-01-01 RX ADMIN — Medication 100 MILLIGRAM(S): at 09:21

## 2017-01-01 RX ADMIN — Medication 100 MILLIGRAM(S): at 22:04

## 2017-01-01 RX ADMIN — PANTOPRAZOLE SODIUM 40 MILLIGRAM(S): 20 TABLET, DELAYED RELEASE ORAL at 06:02

## 2017-01-01 RX ADMIN — Medication 81 MILLIGRAM(S): at 11:31

## 2017-01-01 RX ADMIN — ATORVASTATIN CALCIUM 20 MILLIGRAM(S): 80 TABLET, FILM COATED ORAL at 22:29

## 2017-01-01 RX ADMIN — Medication 1200 MILLIGRAM(S): at 06:01

## 2017-01-01 RX ADMIN — Medication 40 MILLIGRAM(S): at 14:46

## 2017-01-01 RX ADMIN — Medication 100 MILLIGRAM(S): at 14:16

## 2017-01-01 RX ADMIN — Medication 1200 MILLIGRAM(S): at 05:02

## 2017-01-01 RX ADMIN — FENTANYL CITRATE 50 MICROGRAM(S): 50 INJECTION INTRAVENOUS at 05:00

## 2017-01-01 RX ADMIN — Medication 100 MILLIGRAM(S): at 22:11

## 2017-01-01 RX ADMIN — Medication 50 MILLILITER(S): at 17:31

## 2017-01-01 RX ADMIN — CEFTRIAXONE 100 GRAM(S): 500 INJECTION, POWDER, FOR SOLUTION INTRAMUSCULAR; INTRAVENOUS at 12:43

## 2017-01-01 RX ADMIN — Medication 4: at 17:18

## 2017-01-01 RX ADMIN — Medication 30 MILLILITER(S): at 22:11

## 2017-01-01 RX ADMIN — ATORVASTATIN CALCIUM 20 MILLIGRAM(S): 80 TABLET, FILM COATED ORAL at 23:33

## 2017-01-01 RX ADMIN — Medication 81 MILLIGRAM(S): at 11:37

## 2017-01-01 RX ADMIN — PANTOPRAZOLE SODIUM 40 MILLIGRAM(S): 20 TABLET, DELAYED RELEASE ORAL at 13:25

## 2017-01-01 RX ADMIN — PANTOPRAZOLE SODIUM 40 MILLIGRAM(S): 20 TABLET, DELAYED RELEASE ORAL at 05:03

## 2017-01-01 RX ADMIN — HYDROMORPHONE HYDROCHLORIDE 0.25 MILLIGRAM(S): 2 INJECTION INTRAMUSCULAR; INTRAVENOUS; SUBCUTANEOUS at 12:32

## 2017-01-01 RX ADMIN — Medication 3 MILLILITER(S): at 08:37

## 2017-01-01 RX ADMIN — Medication 100 MILLIGRAM(S): at 12:28

## 2017-01-01 RX ADMIN — HYDROMORPHONE HYDROCHLORIDE 0.25 MILLIGRAM(S): 2 INJECTION INTRAMUSCULAR; INTRAVENOUS; SUBCUTANEOUS at 21:11

## 2017-01-01 RX ADMIN — CARVEDILOL PHOSPHATE 6.25 MILLIGRAM(S): 80 CAPSULE, EXTENDED RELEASE ORAL at 12:43

## 2017-01-01 RX ADMIN — WARFARIN SODIUM 5 MILLIGRAM(S): 2.5 TABLET ORAL at 22:56

## 2017-01-01 RX ADMIN — LIDOCAINE 1 PATCH: 4 CREAM TOPICAL at 12:06

## 2017-01-01 RX ADMIN — Medication 7: at 17:16

## 2017-01-01 RX ADMIN — Medication 3 MILLILITER(S): at 09:16

## 2017-01-01 RX ADMIN — Medication 40 MILLIGRAM(S): at 05:26

## 2017-01-01 RX ADMIN — Medication 3 MILLILITER(S): at 14:35

## 2017-01-01 RX ADMIN — PROPOFOL 30 MILLIGRAM(S): 10 INJECTION, EMULSION INTRAVENOUS at 07:58

## 2017-01-01 RX ADMIN — Medication 40 MILLIGRAM(S): at 05:00

## 2017-01-01 RX ADMIN — Medication 3 MILLILITER(S): at 08:14

## 2017-01-01 RX ADMIN — Medication 100 MILLIGRAM(S): at 05:10

## 2017-01-01 RX ADMIN — Medication 30 MILLILITER(S): at 08:29

## 2017-01-01 RX ADMIN — Medication 100 MILLIGRAM(S): at 23:35

## 2017-01-01 RX ADMIN — Medication 100 MILLIGRAM(S): at 09:22

## 2017-01-01 RX ADMIN — Medication 650 MILLIGRAM(S): at 22:30

## 2017-01-01 RX ADMIN — FENTANYL CITRATE 50 MICROGRAM(S): 50 INJECTION INTRAVENOUS at 02:31

## 2017-01-01 RX ADMIN — HYDROMORPHONE HYDROCHLORIDE 0.25 MILLIGRAM(S): 2 INJECTION INTRAMUSCULAR; INTRAVENOUS; SUBCUTANEOUS at 05:00

## 2017-01-01 RX ADMIN — Medication 650 MILLIGRAM(S): at 05:29

## 2017-01-01 RX ADMIN — HYDROMORPHONE HYDROCHLORIDE 0.25 MILLIGRAM(S): 2 INJECTION INTRAMUSCULAR; INTRAVENOUS; SUBCUTANEOUS at 00:04

## 2017-01-01 RX ADMIN — Medication 100 MILLIGRAM(S): at 05:25

## 2017-01-01 RX ADMIN — Medication 25 MILLIGRAM(S): at 13:52

## 2017-01-01 RX ADMIN — Medication 100 MILLIGRAM(S): at 14:39

## 2017-01-01 RX ADMIN — LIDOCAINE 1 PATCH: 4 CREAM TOPICAL at 13:15

## 2017-01-01 RX ADMIN — ENOXAPARIN SODIUM 70 MILLIGRAM(S): 100 INJECTION SUBCUTANEOUS at 21:52

## 2017-01-01 RX ADMIN — Medication 5: at 12:43

## 2017-01-01 RX ADMIN — AZITHROMYCIN 255 MILLIGRAM(S): 500 TABLET, FILM COATED ORAL at 12:44

## 2017-01-01 RX ADMIN — FAMOTIDINE 20 MILLIGRAM(S): 10 INJECTION INTRAVENOUS at 21:06

## 2017-01-01 RX ADMIN — Medication 3 UNIT(S): at 12:26

## 2017-01-01 RX ADMIN — PANTOPRAZOLE SODIUM 40 MILLIGRAM(S): 20 TABLET, DELAYED RELEASE ORAL at 05:00

## 2017-01-01 RX ADMIN — HYDROMORPHONE HYDROCHLORIDE 0.25 MILLIGRAM(S): 2 INJECTION INTRAMUSCULAR; INTRAVENOUS; SUBCUTANEOUS at 05:46

## 2017-01-01 RX ADMIN — FAMOTIDINE 20 MILLIGRAM(S): 10 INJECTION INTRAVENOUS at 23:29

## 2017-01-01 RX ADMIN — Medication 5: at 17:32

## 2017-01-01 RX ADMIN — CARVEDILOL PHOSPHATE 6.25 MILLIGRAM(S): 80 CAPSULE, EXTENDED RELEASE ORAL at 06:23

## 2017-01-01 RX ADMIN — Medication 3 MILLILITER(S): at 03:11

## 2017-01-01 RX ADMIN — LIDOCAINE 1 PATCH: 4 CREAM TOPICAL at 11:39

## 2017-01-01 RX ADMIN — CARVEDILOL PHOSPHATE 6.25 MILLIGRAM(S): 80 CAPSULE, EXTENDED RELEASE ORAL at 04:35

## 2017-01-01 RX ADMIN — Medication 30 MILLILITER(S): at 17:33

## 2017-01-01 RX ADMIN — Medication 10 MILLIGRAM(S): at 06:36

## 2017-01-01 RX ADMIN — Medication 100 MILLIGRAM(S): at 13:51

## 2017-01-01 RX ADMIN — Medication 2: at 09:20

## 2017-01-01 RX ADMIN — SENNA PLUS 2 TABLET(S): 8.6 TABLET ORAL at 21:06

## 2017-01-01 RX ADMIN — Medication 3 MILLILITER(S): at 03:51

## 2017-01-01 RX ADMIN — Medication 25 MILLIGRAM(S): at 13:27

## 2017-01-01 RX ADMIN — Medication 3 MILLILITER(S): at 08:33

## 2017-01-01 RX ADMIN — Medication 10: at 13:29

## 2017-01-01 RX ADMIN — WARFARIN SODIUM 4 MILLIGRAM(S): 2.5 TABLET ORAL at 22:39

## 2017-01-01 RX ADMIN — Medication 3 MILLILITER(S): at 03:36

## 2017-01-01 RX ADMIN — Medication 8 UNIT(S): at 17:56

## 2017-01-01 RX ADMIN — Medication 4: at 22:10

## 2017-01-01 RX ADMIN — Medication 2: at 13:15

## 2017-01-01 RX ADMIN — CEFTRIAXONE 100 GRAM(S): 500 INJECTION, POWDER, FOR SOLUTION INTRAMUSCULAR; INTRAVENOUS at 11:35

## 2017-01-01 RX ADMIN — CARVEDILOL PHOSPHATE 6.25 MILLIGRAM(S): 80 CAPSULE, EXTENDED RELEASE ORAL at 17:16

## 2017-01-01 RX ADMIN — Medication 81 MILLIGRAM(S): at 11:23

## 2017-01-01 RX ADMIN — Medication 650 MILLIGRAM(S): at 23:20

## 2017-01-01 RX ADMIN — Medication 81 MILLIGRAM(S): at 11:25

## 2017-01-01 RX ADMIN — Medication 8: at 12:41

## 2017-01-01 RX ADMIN — Medication 7: at 23:25

## 2017-01-01 RX ADMIN — ISOSORBIDE MONONITRATE 30 MILLIGRAM(S): 60 TABLET, EXTENDED RELEASE ORAL at 12:07

## 2017-01-01 RX ADMIN — Medication 100 MILLIGRAM(S): at 11:24

## 2017-01-01 RX ADMIN — Medication: at 11:57

## 2017-01-01 RX ADMIN — HYDROMORPHONE HYDROCHLORIDE 0.25 MILLIGRAM(S): 2 INJECTION INTRAMUSCULAR; INTRAVENOUS; SUBCUTANEOUS at 04:35

## 2017-01-01 RX ADMIN — LIDOCAINE 1 PATCH: 4 CREAM TOPICAL at 21:00

## 2017-01-01 RX ADMIN — HYDROMORPHONE HYDROCHLORIDE 0.25 MILLIGRAM(S): 2 INJECTION INTRAMUSCULAR; INTRAVENOUS; SUBCUTANEOUS at 16:57

## 2017-01-01 RX ADMIN — PANTOPRAZOLE SODIUM 40 MILLIGRAM(S): 20 TABLET, DELAYED RELEASE ORAL at 07:41

## 2017-01-01 RX ADMIN — Medication 80 MILLIGRAM(S): at 18:32

## 2017-01-01 RX ADMIN — Medication 60 MILLIGRAM(S): at 08:17

## 2017-01-01 RX ADMIN — Medication 3 MILLILITER(S): at 08:39

## 2017-01-01 RX ADMIN — FAMOTIDINE 20 MILLIGRAM(S): 10 INJECTION INTRAVENOUS at 16:42

## 2017-01-01 RX ADMIN — HYDROMORPHONE HYDROCHLORIDE 0.25 MILLIGRAM(S): 2 INJECTION INTRAMUSCULAR; INTRAVENOUS; SUBCUTANEOUS at 22:15

## 2017-01-01 RX ADMIN — Medication 40 MILLIGRAM(S): at 05:10

## 2017-01-01 RX ADMIN — Medication 6 UNIT(S): at 09:25

## 2017-01-01 RX ADMIN — Medication 100 MILLIGRAM(S): at 22:28

## 2017-01-01 RX ADMIN — Medication 100 MILLIGRAM(S): at 23:11

## 2017-01-01 RX ADMIN — CARVEDILOL PHOSPHATE 6.25 MILLIGRAM(S): 80 CAPSULE, EXTENDED RELEASE ORAL at 05:25

## 2017-01-01 RX ADMIN — Medication 100 MILLIGRAM(S): at 22:56

## 2017-01-01 RX ADMIN — HYDROMORPHONE HYDROCHLORIDE 0.25 MILLIGRAM(S): 2 INJECTION INTRAMUSCULAR; INTRAVENOUS; SUBCUTANEOUS at 00:35

## 2017-01-01 RX ADMIN — FAMOTIDINE 20 MILLIGRAM(S): 10 INJECTION INTRAVENOUS at 21:41

## 2017-01-01 RX ADMIN — Medication 7: at 18:19

## 2017-01-01 RX ADMIN — SENNA PLUS 2 TABLET(S): 8.6 TABLET ORAL at 22:23

## 2017-01-01 RX ADMIN — Medication 100 MILLIGRAM(S): at 17:54

## 2017-01-01 RX ADMIN — Medication 650 MILLIGRAM(S): at 07:35

## 2017-01-01 RX ADMIN — LIDOCAINE 1 PATCH: 4 CREAM TOPICAL at 11:50

## 2017-01-01 RX ADMIN — FENTANYL CITRATE 50 MICROGRAM(S): 50 INJECTION INTRAVENOUS at 09:54

## 2017-01-01 RX ADMIN — HEPARIN SODIUM 5000 UNIT(S): 5000 INJECTION INTRAVENOUS; SUBCUTANEOUS at 22:29

## 2017-01-01 RX ADMIN — Medication 3 UNIT(S): at 17:18

## 2017-01-01 RX ADMIN — Medication 8 UNIT(S): at 13:14

## 2017-01-01 RX ADMIN — Medication 100 MILLIGRAM(S): at 21:28

## 2017-01-01 RX ADMIN — Medication 40 MILLIGRAM(S): at 05:37

## 2017-01-01 RX ADMIN — CARVEDILOL PHOSPHATE 6.25 MILLIGRAM(S): 80 CAPSULE, EXTENDED RELEASE ORAL at 18:29

## 2017-01-01 RX ADMIN — Medication 650 MILLIGRAM(S): at 06:15

## 2017-01-01 RX ADMIN — Medication 100 MILLIGRAM(S): at 06:08

## 2017-01-01 RX ADMIN — HYDROMORPHONE HYDROCHLORIDE 0.25 MILLIGRAM(S): 2 INJECTION INTRAMUSCULAR; INTRAVENOUS; SUBCUTANEOUS at 20:31

## 2017-01-01 RX ADMIN — PANTOPRAZOLE SODIUM 40 MILLIGRAM(S): 20 TABLET, DELAYED RELEASE ORAL at 05:49

## 2017-01-01 RX ADMIN — FAMOTIDINE 20 MILLIGRAM(S): 10 INJECTION INTRAVENOUS at 22:57

## 2017-01-01 RX ADMIN — Medication 3 MILLILITER(S): at 04:17

## 2017-01-01 RX ADMIN — LIDOCAINE 1 PATCH: 4 CREAM TOPICAL at 22:29

## 2017-01-01 RX ADMIN — INSULIN GLARGINE 15 UNIT(S): 100 INJECTION, SOLUTION SUBCUTANEOUS at 22:11

## 2017-01-01 RX ADMIN — Medication 60 MILLIGRAM(S): at 10:50

## 2017-01-01 RX ADMIN — Medication 3 UNIT(S): at 09:22

## 2017-01-01 RX ADMIN — Medication 1200 MILLIGRAM(S): at 21:29

## 2017-01-01 RX ADMIN — Medication 100 MILLIGRAM(S): at 05:26

## 2017-01-01 RX ADMIN — INSULIN GLARGINE 5 UNIT(S): 100 INJECTION, SOLUTION SUBCUTANEOUS at 22:05

## 2017-01-01 RX ADMIN — CARVEDILOL PHOSPHATE 6.25 MILLIGRAM(S): 80 CAPSULE, EXTENDED RELEASE ORAL at 05:00

## 2017-01-01 RX ADMIN — FAMOTIDINE 20 MILLIGRAM(S): 10 INJECTION INTRAVENOUS at 23:10

## 2017-01-01 RX ADMIN — Medication 81 MILLIGRAM(S): at 11:40

## 2017-01-01 RX ADMIN — ISOSORBIDE MONONITRATE 30 MILLIGRAM(S): 60 TABLET, EXTENDED RELEASE ORAL at 11:50

## 2017-01-01 RX ADMIN — Medication 100 MILLIGRAM(S): at 09:26

## 2017-01-01 RX ADMIN — Medication 100 MILLIGRAM(S): at 07:41

## 2017-01-01 RX ADMIN — Medication 40 MILLIGRAM(S): at 06:23

## 2017-01-01 RX ADMIN — Medication 8 UNIT(S): at 08:07

## 2017-01-01 RX ADMIN — Medication 1000 MILLIGRAM(S): at 04:37

## 2017-01-01 RX ADMIN — Medication 40 MILLIGRAM(S): at 05:39

## 2017-01-01 RX ADMIN — Medication 10 MILLIGRAM(S): at 05:25

## 2017-01-01 RX ADMIN — Medication 1200 MILLIGRAM(S): at 05:41

## 2017-01-01 RX ADMIN — Medication 8 UNIT(S): at 17:33

## 2017-01-01 RX ADMIN — Medication 3 MILLILITER(S): at 14:55

## 2017-01-01 RX ADMIN — Medication 650 MILLIGRAM(S): at 19:02

## 2017-01-01 RX ADMIN — Medication 3 MILLILITER(S): at 20:17

## 2017-01-01 RX ADMIN — Medication 5: at 08:36

## 2017-01-01 RX ADMIN — CARVEDILOL PHOSPHATE 6.25 MILLIGRAM(S): 80 CAPSULE, EXTENDED RELEASE ORAL at 05:39

## 2017-01-01 RX ADMIN — LIDOCAINE 1 PATCH: 4 CREAM TOPICAL at 12:33

## 2017-01-01 RX ADMIN — SODIUM CHLORIDE 75 MILLILITER(S): 9 INJECTION INTRAMUSCULAR; INTRAVENOUS; SUBCUTANEOUS at 12:00

## 2017-01-01 RX ADMIN — Medication 3 UNIT(S): at 09:21

## 2017-01-01 RX ADMIN — CARVEDILOL PHOSPHATE 6.25 MILLIGRAM(S): 80 CAPSULE, EXTENDED RELEASE ORAL at 18:20

## 2017-01-01 RX ADMIN — MORPHINE SULFATE 0.5 MILLIGRAM(S): 50 CAPSULE, EXTENDED RELEASE ORAL at 20:30

## 2017-01-01 RX ADMIN — Medication 3 MILLILITER(S): at 21:08

## 2017-01-01 RX ADMIN — SENNA PLUS 2 TABLET(S): 8.6 TABLET ORAL at 21:41

## 2017-01-01 RX ADMIN — Medication 100 MILLIGRAM(S): at 13:23

## 2017-01-01 RX ADMIN — Medication 3 UNIT(S): at 17:16

## 2017-01-01 RX ADMIN — Medication 10 MILLIGRAM(S): at 05:49

## 2017-01-01 RX ADMIN — LIDOCAINE 1 PATCH: 4 CREAM TOPICAL at 23:04

## 2017-01-01 RX ADMIN — Medication 25 MILLIGRAM(S): at 22:29

## 2017-01-01 RX ADMIN — Medication 2: at 11:24

## 2017-01-01 RX ADMIN — Medication 650 MILLIGRAM(S): at 12:50

## 2017-01-01 RX ADMIN — LIDOCAINE 1 PATCH: 4 CREAM TOPICAL at 02:56

## 2017-01-01 RX ADMIN — Medication 3 MILLILITER(S): at 15:08

## 2017-01-01 RX ADMIN — Medication 100 MILLIGRAM(S): at 15:46

## 2017-01-01 RX ADMIN — Medication 80 MILLIGRAM(S): at 05:48

## 2017-01-01 RX ADMIN — Medication 4: at 18:07

## 2017-01-01 RX ADMIN — Medication 25 MILLIGRAM(S): at 17:04

## 2017-01-01 RX ADMIN — LIDOCAINE 1 PATCH: 4 CREAM TOPICAL at 11:32

## 2017-01-01 RX ADMIN — Medication 3 MILLILITER(S): at 03:28

## 2017-01-01 RX ADMIN — HEPARIN SODIUM 5000 UNIT(S): 5000 INJECTION INTRAVENOUS; SUBCUTANEOUS at 12:00

## 2017-01-01 RX ADMIN — Medication 80 MILLIGRAM(S): at 18:21

## 2017-01-01 RX ADMIN — Medication 30 MILLIGRAM(S): at 05:26

## 2017-01-01 RX ADMIN — HEPARIN SODIUM 5000 UNIT(S): 5000 INJECTION INTRAVENOUS; SUBCUTANEOUS at 23:22

## 2017-01-01 RX ADMIN — ATORVASTATIN CALCIUM 20 MILLIGRAM(S): 80 TABLET, FILM COATED ORAL at 21:51

## 2017-01-01 RX ADMIN — INSULIN GLARGINE 13 UNIT(S): 100 INJECTION, SOLUTION SUBCUTANEOUS at 22:40

## 2017-01-01 RX ADMIN — Medication 1200 MILLIGRAM(S): at 09:34

## 2017-01-01 RX ADMIN — PROPOFOL 7.08 MICROGRAM(S)/KG/MIN: 10 INJECTION, EMULSION INTRAVENOUS at 11:22

## 2017-01-01 RX ADMIN — Medication 10: at 09:25

## 2017-01-01 RX ADMIN — Medication 2: at 09:50

## 2017-01-01 RX ADMIN — CARVEDILOL PHOSPHATE 6.25 MILLIGRAM(S): 80 CAPSULE, EXTENDED RELEASE ORAL at 17:28

## 2017-01-01 RX ADMIN — HEPARIN SODIUM 5000 UNIT(S): 5000 INJECTION INTRAVENOUS; SUBCUTANEOUS at 06:29

## 2017-01-01 RX ADMIN — Medication 100 MILLIGRAM(S): at 17:39

## 2017-01-01 RX ADMIN — INSULIN GLARGINE 13 UNIT(S): 100 INJECTION, SOLUTION SUBCUTANEOUS at 22:08

## 2017-01-01 RX ADMIN — Medication 8: at 21:29

## 2017-01-01 RX ADMIN — Medication 3 MILLILITER(S): at 14:12

## 2017-01-01 RX ADMIN — LIDOCAINE 1 PATCH: 4 CREAM TOPICAL at 00:59

## 2017-01-01 RX ADMIN — LIDOCAINE 1 PATCH: 4 CREAM TOPICAL at 23:30

## 2017-01-01 RX ADMIN — HEPARIN SODIUM 5000 UNIT(S): 5000 INJECTION INTRAVENOUS; SUBCUTANEOUS at 05:29

## 2017-01-01 RX ADMIN — Medication 650 MILLIGRAM(S): at 03:29

## 2017-01-01 RX ADMIN — ENOXAPARIN SODIUM 70 MILLIGRAM(S): 100 INJECTION SUBCUTANEOUS at 12:43

## 2017-01-01 RX ADMIN — SENNA PLUS 2 TABLET(S): 8.6 TABLET ORAL at 21:29

## 2017-01-01 RX ADMIN — SENNA PLUS 2 TABLET(S): 8.6 TABLET ORAL at 23:10

## 2017-01-01 RX ADMIN — Medication 4 UNIT(S): at 13:00

## 2017-01-01 RX ADMIN — CARVEDILOL PHOSPHATE 12.5 MILLIGRAM(S): 80 CAPSULE, EXTENDED RELEASE ORAL at 17:11

## 2017-01-01 RX ADMIN — Medication 81 MILLIGRAM(S): at 13:01

## 2017-01-01 RX ADMIN — HEPARIN SODIUM 5000 UNIT(S): 5000 INJECTION INTRAVENOUS; SUBCUTANEOUS at 09:00

## 2017-01-01 RX ADMIN — Medication 8 UNIT(S): at 11:24

## 2017-01-01 RX ADMIN — Medication 5: at 09:07

## 2017-01-01 RX ADMIN — Medication 2.5 MILLIGRAM(S): at 04:51

## 2017-01-01 RX ADMIN — Medication 400 MILLIGRAM(S): at 09:50

## 2017-01-01 RX ADMIN — Medication 3 MILLILITER(S): at 14:28

## 2017-01-01 RX ADMIN — EPINEPHRINE 1 MILLIGRAM(S): 0.3 INJECTION INTRAMUSCULAR; SUBCUTANEOUS at 08:42

## 2017-01-01 RX ADMIN — Medication 40 MILLIGRAM(S): at 06:02

## 2017-01-01 RX ADMIN — Medication 650 MILLIGRAM(S): at 21:53

## 2017-01-01 RX ADMIN — Medication 650 MILLIGRAM(S): at 23:54

## 2017-01-01 RX ADMIN — SODIUM CHLORIDE 40 MILLILITER(S): 9 INJECTION, SOLUTION INTRAVENOUS at 14:20

## 2017-01-01 RX ADMIN — Medication 3 MILLILITER(S): at 14:46

## 2017-01-01 RX ADMIN — Medication 650 MILLIGRAM(S): at 21:41

## 2017-01-01 RX ADMIN — POLYETHYLENE GLYCOL 3350 17 GRAM(S): 17 POWDER, FOR SOLUTION ORAL at 23:20

## 2017-01-01 RX ADMIN — HYDROMORPHONE HYDROCHLORIDE 0.25 MILLIGRAM(S): 2 INJECTION INTRAMUSCULAR; INTRAVENOUS; SUBCUTANEOUS at 21:00

## 2017-01-01 RX ADMIN — ATORVASTATIN CALCIUM 20 MILLIGRAM(S): 80 TABLET, FILM COATED ORAL at 23:20

## 2017-01-01 RX ADMIN — Medication 10 UNIT(S): at 18:08

## 2017-01-01 RX ADMIN — ISOSORBIDE MONONITRATE 30 MILLIGRAM(S): 60 TABLET, EXTENDED RELEASE ORAL at 11:39

## 2017-01-01 RX ADMIN — Medication 3 MILLILITER(S): at 15:49

## 2017-01-01 RX ADMIN — Medication 50 MILLILITER(S): at 06:38

## 2017-01-01 RX ADMIN — Medication 100 MILLIGRAM(S): at 01:13

## 2017-01-01 RX ADMIN — Medication 8 UNIT(S): at 17:29

## 2017-01-01 RX ADMIN — Medication 30 MILLILITER(S): at 18:19

## 2017-01-01 RX ADMIN — PANTOPRAZOLE SODIUM 40 MILLIGRAM(S): 20 TABLET, DELAYED RELEASE ORAL at 06:23

## 2017-01-01 RX ADMIN — Medication 100 MILLIGRAM(S): at 21:06

## 2017-01-01 RX ADMIN — Medication 0.5 MILLIGRAM(S): at 08:39

## 2017-01-01 RX ADMIN — FENTANYL CITRATE 50 MICROGRAM(S): 50 INJECTION INTRAVENOUS at 10:09

## 2017-01-01 RX ADMIN — CARVEDILOL PHOSPHATE 12.5 MILLIGRAM(S): 80 CAPSULE, EXTENDED RELEASE ORAL at 17:26

## 2017-01-01 RX ADMIN — Medication 4: at 23:10

## 2017-01-01 RX ADMIN — Medication 650 MILLIGRAM(S): at 20:00

## 2017-01-01 RX ADMIN — Medication 650 MILLIGRAM(S): at 05:30

## 2017-01-01 RX ADMIN — Medication 8 UNIT(S): at 17:40

## 2017-01-01 RX ADMIN — SENNA PLUS 2 TABLET(S): 8.6 TABLET ORAL at 22:05

## 2017-01-01 RX ADMIN — Medication 100 MILLIGRAM(S): at 17:56

## 2017-01-01 RX ADMIN — Medication 100 MILLIGRAM(S): at 23:20

## 2017-01-01 RX ADMIN — HEPARIN SODIUM 5000 UNIT(S): 5000 INJECTION INTRAVENOUS; SUBCUTANEOUS at 09:22

## 2017-01-01 RX ADMIN — PANTOPRAZOLE SODIUM 40 MILLIGRAM(S): 20 TABLET, DELAYED RELEASE ORAL at 05:10

## 2017-01-01 RX ADMIN — FENTANYL CITRATE 50 MICROGRAM(S): 50 INJECTION INTRAVENOUS at 17:45

## 2017-01-01 RX ADMIN — CARVEDILOL PHOSPHATE 6.25 MILLIGRAM(S): 80 CAPSULE, EXTENDED RELEASE ORAL at 05:26

## 2017-01-01 RX ADMIN — HEPARIN SODIUM 5000 UNIT(S): 5000 INJECTION INTRAVENOUS; SUBCUTANEOUS at 18:53

## 2017-01-01 RX ADMIN — HYDROMORPHONE HYDROCHLORIDE 0.25 MILLIGRAM(S): 2 INJECTION INTRAMUSCULAR; INTRAVENOUS; SUBCUTANEOUS at 16:41

## 2017-01-01 RX ADMIN — LIDOCAINE 1 PATCH: 4 CREAM TOPICAL at 04:25

## 2017-01-01 RX ADMIN — Medication 3 MILLILITER(S): at 04:28

## 2017-01-01 RX ADMIN — Medication 3 MILLILITER(S): at 03:31

## 2017-01-01 RX ADMIN — LIDOCAINE 1 PATCH: 4 CREAM TOPICAL at 09:50

## 2017-01-01 RX ADMIN — Medication 7: at 08:07

## 2017-01-01 RX ADMIN — ENOXAPARIN SODIUM 70 MILLIGRAM(S): 100 INJECTION SUBCUTANEOUS at 13:12

## 2017-01-01 RX ADMIN — Medication 3 UNIT(S): at 11:33

## 2017-01-01 RX ADMIN — Medication 100 MILLIGRAM(S): at 22:42

## 2017-01-01 RX ADMIN — HYDROMORPHONE HYDROCHLORIDE 0.25 MILLIGRAM(S): 2 INJECTION INTRAMUSCULAR; INTRAVENOUS; SUBCUTANEOUS at 20:41

## 2017-01-01 RX ADMIN — INSULIN GLARGINE 13 UNIT(S): 100 INJECTION, SOLUTION SUBCUTANEOUS at 21:41

## 2017-01-01 RX ADMIN — Medication 25 MILLIGRAM(S): at 21:06

## 2017-01-01 RX ADMIN — Medication 25 MILLIGRAM(S): at 16:42

## 2017-01-01 RX ADMIN — Medication 100 MILLIGRAM(S): at 06:02

## 2017-01-01 RX ADMIN — LIDOCAINE 1 PATCH: 4 CREAM TOPICAL at 12:40

## 2017-01-01 RX ADMIN — PANTOPRAZOLE SODIUM 40 MILLIGRAM(S): 20 TABLET, DELAYED RELEASE ORAL at 04:38

## 2017-01-01 RX ADMIN — Medication 3 MILLILITER(S): at 08:24

## 2017-01-01 RX ADMIN — Medication 650 MILLIGRAM(S): at 04:58

## 2017-01-01 RX ADMIN — Medication 5: at 18:30

## 2017-01-01 RX ADMIN — Medication 25 MILLIGRAM(S): at 06:10

## 2017-01-01 RX ADMIN — Medication 2: at 13:48

## 2017-01-01 RX ADMIN — Medication 3 UNIT(S): at 12:05

## 2017-01-01 RX ADMIN — SENNA PLUS 2 TABLET(S): 8.6 TABLET ORAL at 21:51

## 2017-01-01 RX ADMIN — FAMOTIDINE 20 MILLIGRAM(S): 10 INJECTION INTRAVENOUS at 21:28

## 2017-01-01 RX ADMIN — Medication 100 MILLIGRAM(S): at 21:50

## 2017-01-01 RX ADMIN — Medication 400 MILLIGRAM(S): at 03:58

## 2017-01-01 RX ADMIN — Medication 3 MILLILITER(S): at 08:40

## 2017-01-01 RX ADMIN — ENOXAPARIN SODIUM 70 MILLIGRAM(S): 100 INJECTION SUBCUTANEOUS at 22:31

## 2017-01-01 RX ADMIN — CARVEDILOL PHOSPHATE 6.25 MILLIGRAM(S): 80 CAPSULE, EXTENDED RELEASE ORAL at 05:48

## 2017-01-01 RX ADMIN — ATORVASTATIN CALCIUM 20 MILLIGRAM(S): 80 TABLET, FILM COATED ORAL at 23:10

## 2017-01-01 RX ADMIN — SODIUM CHLORIDE 100 MILLILITER(S): 9 INJECTION, SOLUTION INTRAVENOUS at 10:01

## 2017-01-01 RX ADMIN — Medication 1000 MILLIGRAM(S): at 10:05

## 2017-01-01 RX ADMIN — Medication 25 MILLIGRAM(S): at 05:41

## 2017-01-01 RX ADMIN — AZITHROMYCIN 255 MILLIGRAM(S): 500 TABLET, FILM COATED ORAL at 12:25

## 2017-01-01 RX ADMIN — Medication 3 MILLILITER(S): at 08:19

## 2017-01-01 RX ADMIN — HYDROMORPHONE HYDROCHLORIDE 0.25 MILLIGRAM(S): 2 INJECTION INTRAMUSCULAR; INTRAVENOUS; SUBCUTANEOUS at 03:39

## 2017-01-01 RX ADMIN — HYDROMORPHONE HYDROCHLORIDE 0.25 MILLIGRAM(S): 2 INJECTION INTRAMUSCULAR; INTRAVENOUS; SUBCUTANEOUS at 15:01

## 2017-01-01 RX ADMIN — Medication 100 MILLIGRAM(S): at 13:13

## 2017-01-01 RX ADMIN — FAMOTIDINE 20 MILLIGRAM(S): 10 INJECTION INTRAVENOUS at 22:12

## 2017-01-01 RX ADMIN — Medication 81 MILLIGRAM(S): at 11:02

## 2017-01-01 RX ADMIN — Medication 1200 MILLIGRAM(S): at 15:37

## 2017-01-01 RX ADMIN — Medication 2: at 05:29

## 2017-01-01 RX ADMIN — SENNA PLUS 2 TABLET(S): 8.6 TABLET ORAL at 23:35

## 2017-01-01 RX ADMIN — INSULIN GLARGINE 5 UNIT(S): 100 INJECTION, SOLUTION SUBCUTANEOUS at 23:56

## 2017-01-01 RX ADMIN — LIDOCAINE 1 PATCH: 4 CREAM TOPICAL at 00:33

## 2017-01-01 RX ADMIN — Medication 3 MILLILITER(S): at 15:09

## 2017-01-01 RX ADMIN — Medication 5 UNIT(S): at 23:10

## 2017-01-01 RX ADMIN — Medication 10: at 14:05

## 2017-01-01 RX ADMIN — FAMOTIDINE 20 MILLIGRAM(S): 10 INJECTION INTRAVENOUS at 23:20

## 2017-01-01 RX ADMIN — Medication 20 MILLIGRAM(S): at 05:02

## 2017-01-01 RX ADMIN — CARVEDILOL PHOSPHATE 6.25 MILLIGRAM(S): 80 CAPSULE, EXTENDED RELEASE ORAL at 06:10

## 2017-01-01 RX ADMIN — Medication 10 MILLIGRAM(S): at 05:13

## 2017-01-01 RX ADMIN — LIDOCAINE 1 PATCH: 4 CREAM TOPICAL at 08:55

## 2017-01-01 RX ADMIN — Medication 3 MILLILITER(S): at 20:14

## 2017-01-01 RX ADMIN — Medication 10: at 09:37

## 2017-01-01 RX ADMIN — HYDROMORPHONE HYDROCHLORIDE 0.25 MILLIGRAM(S): 2 INJECTION INTRAMUSCULAR; INTRAVENOUS; SUBCUTANEOUS at 01:00

## 2017-01-01 RX ADMIN — Medication 100 MILLIGRAM(S): at 14:05

## 2017-01-01 RX ADMIN — Medication 3 UNIT(S): at 12:51

## 2017-01-01 RX ADMIN — Medication 80 MILLIGRAM(S): at 20:10

## 2017-01-01 RX ADMIN — Medication 3 UNIT(S): at 18:30

## 2017-01-01 RX ADMIN — Medication 3 MILLILITER(S): at 15:03

## 2017-01-01 RX ADMIN — Medication 25 MILLIGRAM(S): at 14:06

## 2017-01-01 RX ADMIN — Medication 50 MILLILITER(S): at 18:18

## 2017-01-01 RX ADMIN — Medication 2: at 17:48

## 2017-01-01 RX ADMIN — Medication 5: at 12:05

## 2017-01-01 RX ADMIN — EPINEPHRINE 1 MILLIGRAM(S): 0.3 INJECTION INTRAMUSCULAR; SUBCUTANEOUS at 08:09

## 2017-01-01 RX ADMIN — Medication: at 18:14

## 2017-01-01 RX ADMIN — Medication 4: at 09:29

## 2017-01-01 RX ADMIN — Medication 650 MILLIGRAM(S): at 22:40

## 2017-01-01 RX ADMIN — Medication 3 MILLILITER(S): at 09:13

## 2017-01-01 RX ADMIN — Medication 7: at 12:23

## 2017-01-01 RX ADMIN — INSULIN GLARGINE 13 UNIT(S): 100 INJECTION, SOLUTION SUBCUTANEOUS at 21:51

## 2017-01-01 RX ADMIN — Medication 7: at 11:49

## 2017-01-01 RX ADMIN — CARVEDILOL PHOSPHATE 6.25 MILLIGRAM(S): 80 CAPSULE, EXTENDED RELEASE ORAL at 05:02

## 2017-01-01 RX ADMIN — Medication 100 MILLIGRAM(S): at 22:12

## 2017-01-01 RX ADMIN — CARVEDILOL PHOSPHATE 6.25 MILLIGRAM(S): 80 CAPSULE, EXTENDED RELEASE ORAL at 17:35

## 2017-01-01 RX ADMIN — HYDROMORPHONE HYDROCHLORIDE 0.25 MILLIGRAM(S): 2 INJECTION INTRAMUSCULAR; INTRAVENOUS; SUBCUTANEOUS at 23:58

## 2017-01-01 RX ADMIN — HEPARIN SODIUM 5000 UNIT(S): 5000 INJECTION INTRAVENOUS; SUBCUTANEOUS at 17:48

## 2017-01-01 RX ADMIN — HYDROMORPHONE HYDROCHLORIDE 0.25 MILLIGRAM(S): 2 INJECTION INTRAMUSCULAR; INTRAVENOUS; SUBCUTANEOUS at 17:44

## 2017-01-01 RX ADMIN — Medication 100 MILLIGRAM(S): at 02:56

## 2017-01-01 RX ADMIN — LIDOCAINE 1 PATCH: 4 CREAM TOPICAL at 08:56

## 2017-01-01 RX ADMIN — Medication 650 MILLIGRAM(S): at 05:16

## 2017-01-01 RX ADMIN — LIDOCAINE 1 PATCH: 4 CREAM TOPICAL at 11:24

## 2017-01-01 RX ADMIN — Medication 3 MILLILITER(S): at 21:51

## 2017-01-01 RX ADMIN — LIDOCAINE 1 PATCH: 4 CREAM TOPICAL at 23:10

## 2017-01-01 RX ADMIN — Medication 4: at 17:55

## 2017-01-01 RX ADMIN — Medication 81 MILLIGRAM(S): at 13:15

## 2017-01-01 RX ADMIN — Medication 7: at 22:30

## 2017-01-01 RX ADMIN — Medication 100 MILLIGRAM(S): at 13:29

## 2017-01-01 RX ADMIN — Medication 6 UNIT(S): at 14:04

## 2017-01-01 RX ADMIN — HEPARIN SODIUM 5000 UNIT(S): 5000 INJECTION INTRAVENOUS; SUBCUTANEOUS at 07:41

## 2017-01-01 RX ADMIN — Medication 3 MILLILITER(S): at 15:04

## 2017-01-01 RX ADMIN — Medication 100 MILLIGRAM(S): at 16:42

## 2017-01-01 RX ADMIN — HYDROMORPHONE HYDROCHLORIDE 0.25 MILLIGRAM(S): 2 INJECTION INTRAMUSCULAR; INTRAVENOUS; SUBCUTANEOUS at 10:13

## 2017-01-01 RX ADMIN — Medication 81 MILLIGRAM(S): at 11:35

## 2017-01-01 RX ADMIN — Medication 3 MILLILITER(S): at 20:47

## 2017-01-01 RX ADMIN — CARVEDILOL PHOSPHATE 6.25 MILLIGRAM(S): 80 CAPSULE, EXTENDED RELEASE ORAL at 06:01

## 2017-01-01 RX ADMIN — FENTANYL CITRATE 50 MICROGRAM(S): 50 INJECTION INTRAVENOUS at 07:02

## 2017-01-01 RX ADMIN — LIDOCAINE 1 PATCH: 4 CREAM TOPICAL at 13:01

## 2017-01-01 RX ADMIN — Medication 3 MILLILITER(S): at 03:34

## 2017-01-01 RX ADMIN — Medication 100 MILLIGRAM(S): at 17:15

## 2017-01-01 RX ADMIN — CARVEDILOL PHOSPHATE 6.25 MILLIGRAM(S): 80 CAPSULE, EXTENDED RELEASE ORAL at 05:41

## 2017-01-01 RX ADMIN — Medication 3 MILLILITER(S): at 20:44

## 2017-01-01 RX ADMIN — Medication 100 MILLIGRAM(S): at 17:18

## 2017-01-01 RX ADMIN — Medication 81 MILLIGRAM(S): at 11:50

## 2017-01-01 RX ADMIN — HEPARIN SODIUM 5000 UNIT(S): 5000 INJECTION INTRAVENOUS; SUBCUTANEOUS at 18:06

## 2017-01-01 RX ADMIN — Medication 8 UNIT(S): at 12:42

## 2017-01-01 RX ADMIN — Medication 650 MILLIGRAM(S): at 11:50

## 2017-01-01 RX ADMIN — ATORVASTATIN CALCIUM 20 MILLIGRAM(S): 80 TABLET, FILM COATED ORAL at 22:11

## 2017-01-01 RX ADMIN — Medication 650 MILLIGRAM(S): at 00:10

## 2017-01-01 RX ADMIN — Medication 40 MILLIGRAM(S): at 06:44

## 2017-01-01 RX ADMIN — Medication 3 MILLILITER(S): at 08:38

## 2017-01-01 RX ADMIN — INSULIN GLARGINE 13 UNIT(S): 100 INJECTION, SOLUTION SUBCUTANEOUS at 22:55

## 2017-01-01 RX ADMIN — CARVEDILOL PHOSPHATE 6.25 MILLIGRAM(S): 80 CAPSULE, EXTENDED RELEASE ORAL at 06:44

## 2017-01-01 RX ADMIN — LACTULOSE 20 GRAM(S): 10 SOLUTION ORAL at 15:15

## 2017-01-01 RX ADMIN — FENTANYL CITRATE 50 MICROGRAM(S): 50 INJECTION INTRAVENOUS at 06:47

## 2017-01-01 RX ADMIN — Medication 5: at 22:06

## 2017-01-01 RX ADMIN — CARVEDILOL PHOSPHATE 6.25 MILLIGRAM(S): 80 CAPSULE, EXTENDED RELEASE ORAL at 17:11

## 2017-01-01 RX ADMIN — Medication 25 MILLIGRAM(S): at 13:15

## 2017-01-01 RX ADMIN — INSULIN GLARGINE 15 UNIT(S): 100 INJECTION, SOLUTION SUBCUTANEOUS at 23:09

## 2017-01-01 RX ADMIN — HYDROMORPHONE HYDROCHLORIDE 0.25 MILLIGRAM(S): 2 INJECTION INTRAMUSCULAR; INTRAVENOUS; SUBCUTANEOUS at 07:50

## 2017-01-01 RX ADMIN — FAMOTIDINE 20 MILLIGRAM(S): 10 INJECTION INTRAVENOUS at 22:40

## 2017-01-01 RX ADMIN — Medication 25 MILLIGRAM(S): at 21:41

## 2017-01-01 RX ADMIN — ATORVASTATIN CALCIUM 20 MILLIGRAM(S): 80 TABLET, FILM COATED ORAL at 22:28

## 2017-01-01 RX ADMIN — Medication 1 SUPPOSITORY(S): at 18:04

## 2017-01-01 RX ADMIN — Medication 8: at 21:08

## 2017-01-01 RX ADMIN — Medication 1200 MILLIGRAM(S): at 18:22

## 2017-01-01 RX ADMIN — Medication 3 MILLILITER(S): at 20:30

## 2017-01-01 RX ADMIN — INSULIN GLARGINE 10 UNIT(S): 100 INJECTION, SOLUTION SUBCUTANEOUS at 22:35

## 2017-01-01 RX ADMIN — CARVEDILOL PHOSPHATE 6.25 MILLIGRAM(S): 80 CAPSULE, EXTENDED RELEASE ORAL at 06:02

## 2017-01-01 RX ADMIN — Medication 3 UNIT(S): at 08:48

## 2017-01-01 RX ADMIN — FENTANYL CITRATE 50 MICROGRAM(S): 50 INJECTION INTRAVENOUS at 22:31

## 2017-01-01 RX ADMIN — LIDOCAINE 1 PATCH: 4 CREAM TOPICAL at 00:30

## 2017-01-01 RX ADMIN — Medication 100 MILLIGRAM(S): at 14:06

## 2017-01-01 RX ADMIN — HEPARIN SODIUM 5000 UNIT(S): 5000 INJECTION INTRAVENOUS; SUBCUTANEOUS at 23:20

## 2017-01-01 RX ADMIN — Medication 650 MILLIGRAM(S): at 11:34

## 2017-01-01 RX ADMIN — Medication 100 MILLIGRAM(S): at 17:04

## 2017-01-01 RX ADMIN — Medication 40 MILLIGRAM(S): at 13:44

## 2017-01-01 RX ADMIN — Medication 3 MILLILITER(S): at 08:07

## 2017-01-01 RX ADMIN — Medication 3 MILLILITER(S): at 08:47

## 2017-01-01 RX ADMIN — FENTANYL CITRATE 50 MICROGRAM(S): 50 INJECTION INTRAVENOUS at 08:28

## 2017-01-01 RX ADMIN — Medication 80 MILLIGRAM(S): at 17:18

## 2017-01-01 RX ADMIN — WARFARIN SODIUM 2.5 MILLIGRAM(S): 2.5 TABLET ORAL at 22:57

## 2017-01-01 RX ADMIN — ERYTHROPOIETIN 15000 UNIT(S): 10000 INJECTION, SOLUTION INTRAVENOUS; SUBCUTANEOUS at 15:47

## 2017-01-01 RX ADMIN — Medication 650 MILLIGRAM(S): at 12:34

## 2017-01-01 RX ADMIN — INSULIN GLARGINE 13 UNIT(S): 100 INJECTION, SOLUTION SUBCUTANEOUS at 21:29

## 2017-01-01 RX ADMIN — CEFTRIAXONE 100 GRAM(S): 500 INJECTION, POWDER, FOR SOLUTION INTRAMUSCULAR; INTRAVENOUS at 10:34

## 2017-01-01 RX ADMIN — Medication 25 MILLIGRAM(S): at 13:13

## 2017-01-01 RX ADMIN — Medication 650 MILLIGRAM(S): at 06:35

## 2017-01-01 RX ADMIN — ATORVASTATIN CALCIUM 20 MILLIGRAM(S): 80 TABLET, FILM COATED ORAL at 22:23

## 2017-01-01 RX ADMIN — Medication 1200 MILLIGRAM(S): at 18:52

## 2017-01-01 RX ADMIN — ATORVASTATIN CALCIUM 20 MILLIGRAM(S): 80 TABLET, FILM COATED ORAL at 22:57

## 2017-01-01 RX ADMIN — CARVEDILOL PHOSPHATE 6.25 MILLIGRAM(S): 80 CAPSULE, EXTENDED RELEASE ORAL at 05:10

## 2017-01-01 RX ADMIN — Medication 80 MILLIGRAM(S): at 08:23

## 2017-01-01 RX ADMIN — Medication 25 MILLIGRAM(S): at 06:44

## 2017-01-01 RX ADMIN — Medication 3 MILLILITER(S): at 20:56

## 2017-01-01 RX ADMIN — Medication 40 MILLIGRAM(S): at 05:01

## 2017-01-01 RX ADMIN — Medication 3 MILLILITER(S): at 08:36

## 2017-01-01 RX ADMIN — Medication 81 MILLIGRAM(S): at 12:00

## 2017-01-01 RX ADMIN — LIDOCAINE 1 PATCH: 4 CREAM TOPICAL at 11:27

## 2017-01-01 RX ADMIN — ISOSORBIDE MONONITRATE 30 MILLIGRAM(S): 60 TABLET, EXTENDED RELEASE ORAL at 17:39

## 2017-01-01 RX ADMIN — INSULIN GLARGINE 18 UNIT(S): 100 INJECTION, SOLUTION SUBCUTANEOUS at 22:11

## 2017-01-01 RX ADMIN — Medication 100 MILLIGRAM(S): at 09:42

## 2017-01-01 RX ADMIN — Medication 1200 MILLIGRAM(S): at 23:09

## 2017-01-01 RX ADMIN — LIDOCAINE 1 PATCH: 4 CREAM TOPICAL at 01:26

## 2017-01-01 RX ADMIN — Medication 2 UNIT(S): at 12:27

## 2017-01-01 RX ADMIN — HYDROMORPHONE HYDROCHLORIDE 0.25 MILLIGRAM(S): 2 INJECTION INTRAMUSCULAR; INTRAVENOUS; SUBCUTANEOUS at 16:04

## 2017-01-01 RX ADMIN — Medication 3 MILLILITER(S): at 21:23

## 2017-01-01 RX ADMIN — Medication 8 UNIT(S): at 09:51

## 2017-01-01 RX ADMIN — CARVEDILOL PHOSPHATE 6.25 MILLIGRAM(S): 80 CAPSULE, EXTENDED RELEASE ORAL at 17:18

## 2017-01-01 RX ADMIN — HYDROMORPHONE HYDROCHLORIDE 0.25 MILLIGRAM(S): 2 INJECTION INTRAMUSCULAR; INTRAVENOUS; SUBCUTANEOUS at 09:52

## 2017-01-01 RX ADMIN — HYDROMORPHONE HYDROCHLORIDE 0.25 MILLIGRAM(S): 2 INJECTION INTRAMUSCULAR; INTRAVENOUS; SUBCUTANEOUS at 12:47

## 2017-01-01 RX ADMIN — LIDOCAINE 1 PATCH: 4 CREAM TOPICAL at 02:54

## 2017-01-01 RX ADMIN — Medication 2: at 21:45

## 2017-01-01 RX ADMIN — Medication 100 MILLIGRAM(S): at 17:34

## 2017-01-01 RX ADMIN — Medication 100 MILLIGRAM(S): at 05:02

## 2017-01-01 RX ADMIN — LIDOCAINE 1 PATCH: 4 CREAM TOPICAL at 23:44

## 2017-01-01 RX ADMIN — CARVEDILOL PHOSPHATE 6.25 MILLIGRAM(S): 80 CAPSULE, EXTENDED RELEASE ORAL at 18:06

## 2017-01-01 RX ADMIN — Medication 3 MILLILITER(S): at 09:41

## 2017-01-01 RX ADMIN — Medication 1200 MILLIGRAM(S): at 18:28

## 2017-01-01 RX ADMIN — Medication 3 MILLILITER(S): at 20:48

## 2017-01-01 RX ADMIN — Medication 400 MILLIGRAM(S): at 03:40

## 2017-01-01 RX ADMIN — Medication 10 UNIT(S): at 09:29

## 2017-01-01 RX ADMIN — Medication 8 UNIT(S): at 13:12

## 2017-01-01 RX ADMIN — HYDROMORPHONE HYDROCHLORIDE 0.25 MILLIGRAM(S): 2 INJECTION INTRAMUSCULAR; INTRAVENOUS; SUBCUTANEOUS at 08:20

## 2017-01-01 RX ADMIN — Medication 650 MILLIGRAM(S): at 13:28

## 2017-01-01 RX ADMIN — Medication 11.06 MICROGRAM(S)/KG/MIN: at 11:21

## 2017-01-01 RX ADMIN — Medication 2: at 22:41

## 2017-01-01 RX ADMIN — Medication 30 MILLILITER(S): at 11:32

## 2017-01-01 RX ADMIN — Medication 10 UNIT(S): at 13:15

## 2017-01-01 RX ADMIN — ATORVASTATIN CALCIUM 20 MILLIGRAM(S): 80 TABLET, FILM COATED ORAL at 21:29

## 2017-03-17 NOTE — ED ADULT NURSE REASSESSMENT NOTE - NS ED NURSE REASSESS COMMENT FT1
Pt in PEA at 0809, given 1 amp of EPI and 1amp of calcium chloride. ROSC at 0812 in accelerated junctional rhythm. Code team remains at bedside. Pt in PEA at 0809, CPR initiated. given 1 amp of EPI and 1amp of calcium chloride. ROSC at 0812 in accelerated junctional rhythm. Code team remains at bedside.

## 2017-03-17 NOTE — ED PROVIDER NOTE - MEDICAL DECISION MAKING DETAILS
likely cardiac arrest complicated by hpoglycemia . no pericardial effusion on bedside ultrasound need for pressor support right fem a line placed. icu placed left subclavian cvc for numerous iv gtt. son updated on crtical nature of patient

## 2017-03-17 NOTE — ED ADULT NURSE REASSESSMENT NOTE - NS ED NURSE REASSESS COMMENT FT1
Pt in PEA at 0840, MD, RT and code team remain at bedside. given 1 amp of epi by TONY Romero Pt in PEA at 0840, MD, RT and code team remain at bedside. CPR initiated. given 1 amp of epi by TONY Romero.

## 2017-03-17 NOTE — PATIENT PROFILE ADULT. - VISION (WITH CORRECTIVE LENSES IF THE PATIENT USUALLY WEARS THEM):
b/l cataract surgery in oct/Partially impaired: cannot see medication labels or newsprint, but can see obstacles in path, and the surrounding layout; can count fingers at arm's length

## 2017-03-17 NOTE — ED ADULT NURSE REASSESSMENT NOTE - NS ED NURSE REASSESS COMMENT FT1
Levophed titrated to 1.0mcg/kg/min, propofol titrated to 10mcg/kg/min. 1 Attempt made by MD Suggs to place Manisha. ICU MARIAJOSE Guan at bedside.

## 2017-03-17 NOTE — ED PROCEDURE NOTE - CPROC ED ARTER LINE DETAIL1
Positive blood return was obtained via the catheter./Hemostasis was achieved with direct pressure, and a dry sterile dressing applied./Connected to a pressurized flush line./Line was sutured in place./Using sterile technique, the correct location was identified, and a needle was inserted into the artery (specify in FT).

## 2017-03-17 NOTE — ED ADULT NURSE REASSESSMENT NOTE - NS ED NURSE REASSESS COMMENT FT1
Patient tirtrated down to 0.3mcgs/kgs/min on levophed at this time. Left TLC placed by Roge BILLY, pending x-ray.

## 2017-03-17 NOTE — ED PROVIDER NOTE - CARE PLAN
Principal Discharge DX:	Cardiopulmonary arrest with successful resuscitation  Secondary Diagnosis:	Hypoglycemia Principal Discharge DX:	Cardiopulmonary arrest with successful resuscitation  Secondary Diagnosis:	Hypoglycemia  Secondary Diagnosis:	Congestive heart failure

## 2017-03-17 NOTE — ED ADULT NURSE REASSESSMENT NOTE - NS ED NURSE REASSESS COMMENT FT1
Pt arrives with ETT in place by EMS # 7.5. RT Edy at bedside - suctioning as needed. MD at bedside with glidescope to check tube placement. MD confirmed ETT in place. Pt HR 68 in junctional rhythm. Repeat .

## 2017-03-17 NOTE — H&P ADULT - HISTORY OF PRESENT ILLNESS
68[facility], [facility], CHF (diastolic). Found lethargic at home this morning by her son. Her BG was 22, the son attempted to give her juice,, but she collapsed. EMS arrived and she was found in PEA. Resuscitation was undertaken, after several rounds of Epinephrine there was ROSC. Upon arrival to the ER she again developed PEA, resuscitation was resumed and again there was ROSC,  however she was in shock and placed on Levophed. Initial ABG showed a severe respiratory acidosis 68[facility], HTN, CRF, CHF (diastolic). Found lethargic at home this morning by her son. Her BG was 22, the son attempted to give her juice,, but she collapsed. EMS arrived and she was found in PEA. Resuscitation was undertaken, after several rounds of Epinephrine there was ROSC. Upon arrival to the ER she again developed PEA, resuscitation was resumed and again there was ROSC,  however she was in shock and placed on Levophed. Initial ABG showed a severe respiratory acidosis. She was on full vent support and settings were adjusted. She was spontaneously breathing but had no spontaneous movement and was not responding to her name, so the modified hypothermia protocol was initiated. EKG showed no acute St/T changes. In speaking with the patient's son, she had been complaining of general lethargy and weakness. No recent chest pain, cough fever.

## 2017-03-17 NOTE — H&P ADULT - ASSESSMENT
Impression/Plan:    1) S/P Cardiac Arrest: full etiology unclear. May likeley be related to severe hypoglycemic event, alteration in mental status, leading to severe hypercapnea. However can not exclude a primary CNS, cardiac event at this point. Given persistent AMS, will initiat modified hypothermia and will check Head CT. Will F/U troponis and check Echo. Will also notify Dr Lindsay office.     2) Hypercapneic Respiratory failure/Respiratory Acidosis: Continue vent support and hyperventilate for now. Will repeat ABG to current. VAP bundle May also be related to worsening Acute on chronic CHF, given pulmonary Edema pattern on CXR    3) Pulmonary Edema/CHF: Will diurese as tolerated, Given CRF and elevated Creatnine, may require Lasix Drip. Would Aclo consider Dobutamine drip to help improve Right to Left flow and overall Cardiac output.    4) Cardiogenic Shock: Will wean levo as tolerated, Check Echo. Consider Dobutamine    5) Chronic Renal failure: Impression/Plan:    1) S/P Cardiac Arrest: full etiology unclear. May likeley be related to severe hypoglycemic event, alteration in mental status, leading to severe hypercapnea. However can not exclude a primary CNS, cardiac event at this point. Given persistent AMS, will initiat modified hypothermia and will check Head CT. Will F/U troponis and check Echo. Will also notify Dr Lindsay office.     2) Hypercapneic Respiratory failure/Respiratory Acidosis: Continue vent support and hyperventilate for now. Will repeat ABG to current. VAP bundle May also be related to worsening Acute on chronic CHF, given pulmonary Edema pattern on CXR    3) Pulmonary Edema/CHF: Will diurese as tolerated, Given CRF and elevated Creatnine, may require Lasix Drip. Would Aclo consider Dobutamine drip to help improve Right to Left flow and overall Cardiac output.    4) Cardiogenic Shock: Will wean levo as tolerated, Check Echo. Consider Dobutamine    5) Chronic Renal failure:  Will avoid neprotoxic agents at this point and F/u BUN/Crt. Given Pulmonary congestion and heart failure presentation, will need diuresis at this point.    I spent a total of 60 mins of time evaluating and treating this critical patient, including speaking with the patient's son

## 2017-03-17 NOTE — ED PROVIDER NOTE - CRITICAL CARE PROVIDED
interpretation of diagnostic studies/consultation with other physicians/direct patient care (not related to procedure)/additional history taking/documentation/conducted a detailed discussion of DNR status

## 2017-03-17 NOTE — CHART NOTE - NSCHARTNOTEFT_GEN_A_CORE
Critical Care Invasive Line Procedure Note  CRITICAL [id number]    Procedure being performed: central Venous Line   Indication for procedure: Shock   Person performing time out: TONY Hernandez  [facility] performing procedure[facility]   The procedure was performed emergently.    Under sterile conditions,  chlorhexidine scrub was used and we placed the patient in the supine position.  Utilizing the Seldinger technique a Central Venous catheter was successfully placed into the Left Subclavian Vein.     The catheter was sutured in place. There were no complications and a dry sterile dressing was placed.     A CXR confirmed good placement.    The patient tolerated the procedure well.

## 2017-03-17 NOTE — ED ADULT TRIAGE NOTE - CHIEF COMPLAINT QUOTE
BIBA, patient arrives to ED, s/p cardiac arrest, patient was given d50 for an initial blood sugar of 23, given 5 epi's, RSOC, no family present in ED upon arrival, no report of trauma

## 2017-03-17 NOTE — CHART NOTE - NSCHARTNOTEFT_GEN_A_CORE
Critical Care Invasive Line Procedure Note  CRITICAL [id number]    Procedure being performed: Arterial Line   Indication for procedure: Shock   Person performing time out: Mary JIMENEZ   Person performing procedure:Hormann PAC   The procedure was performed emergently.    Under sterile conditions,  chlorhexidine scrub was used and we placed the patient in the supine/trendelenberg position.  Utilizing the Seldinger technique a 20 ga  Arterial  catheter was successfully placed into the  Right Femoral Artery.     The catheter was sutured in place. There were no complications and a dry sterile dressing was placed.       The patient tolerated the procedure well.

## 2017-03-17 NOTE — ED PROVIDER NOTE - OBJECTIVE STATEMENT
pt found by son in living room in [facility] and unresponsive last seen normal last night. per son her sugar was low and she looked to be not breathing further history unable to obtain 2/2 to pts mental status. per ems she was unconscious when arrive with blood sugar 22 and then preceded to have an asystolic arrest that responded to iv epineprhine and pt presented to me with a pluse but unresponsive

## 2017-03-17 NOTE — ED ADULT NURSE NOTE - OBJECTIVE STATEMENT
Pt arrives to ED s/p cardiac arrest. Pt found with BS initially 23 by EMS, given amp of D50. Became bradycardiac with EMS, went into cardiac arrest on scene. CPR initiated by EMS - given 5 epis and 1 amp of bicarb by ems via #20g to left arm established PTA. Pt arrives intubated with #7.5 by ems. While in ED, pt went into cardiac arrest x2 with ROSC x2. Unable to obtain blood pressure. MD currently at bedside with A-line setup.

## 2017-03-17 NOTE — ED ADULT NURSE REASSESSMENT NOTE - NS ED NURSE REASSESS COMMENT FT1
Right Femoral Manisha placed by ICU MARIAJOSE Guan, b/p 146/68 on manisha, HR 96. Levophed titrated to 0.5mcg/kg/min, propofol at 20mcg/kg/min.

## 2017-03-17 NOTE — PATIENT PROFILE ADULT. - NS PRO PT REFERRAL QUES 2 YN
last hospital visit pt had short term rehab then went to [facility] for a short time/yes

## 2017-03-17 NOTE — H&P ADULT - ATTENDING COMMENTS
I personally interviewed and examined the patient, independent of the above practitioner (MARIAJOSE Espinoza). I agree with the above, and have updated the appropriate areas of the chart as noted. I spent _65__ minutes with the patient and family separate from the above practitioner for my evaluation and interview.     Christina Cardenas, [facility], provides me with no information as she is intubated and minimally responsive. Her HPI is endorsed by her Son and is as noted above. It would seem that she suffered a cardiac event, likely trigged by hypoglycemia, which lead to acute on chronic hypercapnia, that lead to increased myocardial demand, and in the setting of her chronic cardiac disease did not have the reserve to tolerate the increased demand. patient on ventilator for hypoxia and respiratory failure. Ventilator bundle and lung protective ventilator settings in place. her acidosis initially was a mixed etiology with acute on chronic respiratory and a metabolic component. The SENTHIL is in the setting of presumed CKD and stems from her shock state. Repeat TTE noted a reduced EF ~30% from ~40% last week and "normal" 2 years ago (we've asked her cardiology group, [facility], to come see her). she has a left subclavian TLC for dobutamine infusion and a right femoral arterial line for frequent blood draws. She does open eyes spontaneously and move her right side purposefully, therefore I personally interviewed and examined the patient, independent of the above practitioner (MARIAJOSE Espinoza). I agree with the above, and have updated the appropriate areas of the chart as noted. I spent _65__ minutes with the patient and family separate from the above practitioner for my evaluation and interview.     Christina Cardenas, [facility], provides me with no information as she is intubated and minimally responsive. Her HPI is endorsed by her Son and is as noted above. It would seem that she suffered a cardiac event, likely trigged by hypoglycemia, which lead to acute on chronic hypercapnia, that lead to increased myocardial demand, and in the setting of her chronic cardiac disease did not have the reserve to tolerate the increased demand. patient on ventilator for hypoxia and respiratory failure. Ventilator bundle and lung protective ventilator settings in place. her acidosis initially was a mixed etiology with acute on chronic respiratory and a metabolic component. The SENTHIL is in the setting of presumed CKD and stems from her shock state. Repeat TTE noted a reduced EF ~30% from ~40% last week and "normal" 2 years ago (we've asked her cardiology group, [facility], to come see her). she has a left subclavian TLC for dobutamine infusion and a right femoral arterial line for frequent blood draws. She does open eyes spontaneously and move her right side purposefully, therefore modified hypothermia was not initiated. We'll continue to support her cardiogenic shock state with inotropic infusion and respiratory failure with ventilator support.

## 2017-03-18 NOTE — PROGRESS NOTE ADULT - ASSESSMENT
68F s/p cardiac arrest, respiratory failure intubated (3/17) extubated (3/18), cardiogenic shock (dobutatmine infusion) in setting of reduced EF (<50%), diabetes, hypertension

## 2017-03-18 NOTE — PROGRESS NOTE ADULT - PROBLEM SELECTOR PLAN 1
- possible cath this week  - likely not primary cardiac, however from inability to compensate for myocardial demand  - reduced dobutamine from 20mcg/kg/min -->5mcg/kg/min hope to have off today.

## 2017-03-18 NOTE — PROGRESS NOTE ADULT - SUBJECTIVE AND OBJECTIVE BOX
INTERVAL HISTORY:  	  MEDICATIONS:  DOBUTamine Infusion 2MICROgram(s)/kG/Min IV Continuous <Continuous>  fentaNYL    Injectable 50MICROGram(s) IV Push every 1 hour PRN  dextrose Gel 1Dose(s) Oral once PRN  dextrose 50% Injectable 12.5Gram(s) IV Push once  dextrose 50% Injectable 25Gram(s) IV Push once  dextrose 50% Injectable 25Gram(s) IV Push once  glucagon  Injectable 1milliGRAM(s) IntraMuscular once PRN  atorvastatin 20milliGRAM(s) Oral at bedtime  insulin lispro (HumaLOG) corrective regimen sliding scale  SubCutaneous every 6 hours  heparin  Injectable 5000Unit(s) SubCutaneous every 12 hours  aspirin  chewable 81milliGRAM(s) Oral daily  lidocaine   Patch 1Patch Transdermal every 72 hours        PHYSICAL EXAM:  T(C): 37.7, Max: 37.8 (03-18 @ 06:00)  HR: 98 (69 - 104)  BP: 114/60 (114/60 - 114/60)  RR: 27 (11 - 39)  SpO2: 99% (91% - 100%)  Wt(kg): --  I&O's Summary  I & Os for 24h ending 18 Mar 2017 07:00  =============================================  IN: 1416.5 ml / OUT: 1365 ml / NET: 51.5 ml    I & Os for current day (as of 18 Mar 2017 10:25)  =============================================  IN: 17.7 ml / OUT: 20 ml / NET: -2.3 ml    Cardiovascular: Normal S1 S2, No JVD, No murmurs, No edema  Respiratory: diminished BS at bases	  Gastrointestinal:  Soft, Non-tender, + BS	  Skin: No rashes, No ecchymoses, No cyanosis  Extremities: Normal range of motion, No clubbing, cyanosis or edema  Vascular: Peripheral pulses palpable 2+ bilaterally    TELEMETRY:SR        OTHER: 	    LABS:	 	    CARDIAC MARKERS:                     12.7   12.1  )-----------( 143      ( 17 Mar 2017 08:47 )             39.6     18 Mar 2017 06:25    137    |  89     |  76.0   ----------------------------<  247    4.2     |  36.0   |  2.44     Ca    8.9        18 Mar 2017 06:25  Phos  5.7       18 Mar 2017 06:25  Mg     2.0       18 Mar 2017 06:25    TPro  6.2    /  Alb  3.1    /  TBili  0.6    /  DBili  x      /  AST  184    /  ALT  90     /  AlkPhos  95     17 Mar 2017 15:02      ASSESSMENT/PLAN: 	BABAR MUÑIZ is a 68y Female with PMH of of DM, HTN, CRF, CHF, a/w  S/P Cardiac Arrest with unclear etiology, ?hypoglycemia induced. EKG showed no acute changes. As per her son she has been complaining of general lethargy and weakness. No h/o of recent chest pain. Her recent nuclear stress test 3/13/17 with old infarct and no active ischemia, EF of 46%, echo form 3/11/17 with EF of 40-45%, mild to moderate valvular abn, mod pulm HTN.  - Severe LV dysfunction based on Echocardiogram yesterday, new since one month ago, Pulmonary Edema/CHF. Agree with diuresis as tolerated,     - Hypercapnic Respiratory failure/Respiratory Acidosis: Continue respiratory support  - Chronic Renal failure, f/u BUN/Crt, lytes.   - May come to Cardiac Catheterization when renal function is stable  - Rhythm stable

## 2017-03-18 NOTE — PROGRESS NOTE ADULT - PROBLEM SELECTOR PLAN 2
- extubated 3/18 to NIV; acute on chronic respiratory acidosis with hypercapnia  - attempt off NIV this afternoon/evening  - will require qhs/prn

## 2017-03-18 NOTE — PROGRESS NOTE ADULT - SUBJECTIVE AND OBJECTIVE BOX
IV Contrast (Unknown)                                                             Code Status:FULL  [facility] Patient is a 68y old  Female who presents with a chief complaint of Unresponsive/Cardiac Arrest (17 Mar 2017 11:27)      BRIEF HOSPITAL COURSE: admitted s/p cardiac arrest, likely secondary to hypoglycemia induced hypercapnia (acute on chronic) from stupor causing increased myocardial demand. ROSC with return of mental status, therefore no modified hypothermia, intubated 3/17 extubated 3/18, on dobutamine infusion for cardiogenic shock  Events last 24 hours: extubated 3/18 @ 0645 to NIV; on dobutamine  Review of systems:     unable to fully assess secondary to being on NIV; complaints of pain at chest compression site (sternum)     PAST MEDICAL & SURGICAL HISTORY:  Diabetes  Kidney disease  Hypertension  No significant past surgical history        Medications:    DOBUTamine Infusion 2MICROgram(s)/kG/Min IV Continuous <Continuous>      fentaNYL    Injectable 50MICROGram(s) IV Push every 1 hour PRN      heparin  Injectable 5000Unit(s) SubCutaneous every 12 hours  aspirin  chewable 81milliGRAM(s) Oral daily        dextrose Gel 1Dose(s) Oral once PRN  dextrose 50% Injectable 12.5Gram(s) IV Push once  dextrose 50% Injectable 25Gram(s) IV Push once  dextrose 50% Injectable 25Gram(s) IV Push once  glucagon  Injectable 1milliGRAM(s) IntraMuscular once PRN  atorvastatin 20milliGRAM(s) Oral at bedtime  insulin lispro (HumaLOG) corrective regimen sliding scale  SubCutaneous every 6 hours        lidocaine   Patch 1Patch Transdermal every 72 hours        Mode: AC/ CMV (Assist Control/ Continuous Mandatory Ventilation)  RR (machine): 22  TV (machine): 300  FiO2: 30  PEEP: 5  MAP: 9  PIP: 25      Adult Advanced Hemodynamics Last 24 Hrs  CVP(mm Hg): --  CVP(cm H2O): --  CO: --  CI: --  PA: --  PA(mean): --  PCWP: --  SVR: --  SVRI: --  PVR: --  PVRI: --      ICU Vital Signs Last 24 Hrs  T(C): 37.7, Max: 37.8 (03-18 @ 06:00)  T(F): 99.9, Max: 100 (03-18 @ 06:00)  HR: 87 (69 - 104)  BP: 109/47 (109/47 - 116/57)  BP(mean): 68 (68 - 82)  ABP: 116/42 (95/26 - 147/50)  ABP(mean): 67 (49 - 85)  RR: 30 (11 - 39)  SpO2: 99% (91% - 100%)    CAPILLARY BLOOD GLUCOSE  315 (18 Mar 2017 12:00)  260 (18 Mar 2017 06:00)  160 (18 Mar 2017 00:00)  125 (17 Mar 2017 18:00)  113 (17 Mar 2017 14:00)            LABS:  CBC Full  -  ( 17 Mar 2017 08:47 )  WBC Count : 12.1 K/uL  Hemoglobin : 12.7 g/dL  Hematocrit : 39.6 %  Platelet Count - Automated : 143 K/uL  Mean Cell Volume : 91.0 fl  Mean Cell Hemoglobin : 29.2 pg  Mean Cell Hemoglobin Concentration : 32.1 g/dL  Auto Neutrophil # : 6.2 K/uL  Auto Lymphocyte # : 4.7 K/uL  Auto Monocyte # : 0.6 K/uL  Auto Eosinophil # : 0.3 K/uL  Auto Basophil # : 0.0 K/uL  Auto Neutrophil % : 51.5 %  Auto Lymphocyte % : 39.1 %  Auto Monocyte % : 4.6 %  Auto Eosinophil % : 2.8 %  Auto Basophil % : 0.3 %    18 Mar 2017 06:25    137    |  89     |  76.0   ----------------------------<  247    4.2     |  36.0   |  2.44     Ca    8.9        18 Mar 2017 06:25  Phos  5.7       18 Mar 2017 06:25  Mg     2.0       18 Mar 2017 06:25    TPro  6.2    /  Alb  3.1    /  TBili  0.6    /  DBili  x      /  AST  184    /  ALT  90     /  AlkPhos  95     17 Mar 2017 15:02      CARDIAC MARKERS ( 17 Mar 2017 15:02 )  x     / 0.40 ng/mL / x     / x     / x      CARDIAC MARKERS ( 17 Mar 2017 08:47 )  x     / 0.10 ng/mL / x     / x     / x           PT/INR - ( 17 Mar 2017 09:43 )   PT: 13.1 sec;   INR: 1.19 ratio         PTT - ( 17 Mar 2017 09:43 )  PTT:34.7 sec  Urinalysis Basic - ( 17 Mar 2017 15:03 )    Color: Yellow / Appearance: Clear / S.010 / pH: x  Gluc: x / Ketone: Negative  / Bili: Negative / Urobili: Negative mg/dL   Blood: x / Protein: Negative mg/dL / Nitrite: Negative   Leuk Esterase: Trace / RBC: x / WBC 0-2   Sq Epi: x / Non Sq Epi: Occasional / Bacteria: x          CULTURES:      Physical Examination:    General: No acute distress.  Alert, interactive. Follows simple instructions    HEENT: Atraumatic, MMM, Pupils equal, reactive to light.  Symmetric. NIV    PULM: reduced at bases, clear anterior    CVS: Regular rate and rhythm, MODESTO, rubs; S1/S2. No JVD/HJR    ABD: Soft, nondistended, nontender, normoactive bowel sounds, no masses    EXT: No edema, nontender. Distal pulses 2+ equal bilaterally; left subclav tlc c/d/i; arterial line c/d/i    SKIN: Warm and well perfused, no rashes noted.    Neuro: nonfocal, moves all extremities.     RADIOLOGY:    EXAM:  CT BRAIN                          PROCEDURE DATE:  2017        INTERPRETATION:  EXAMINATION DATE: 2017 at 1127 hours.  CLINICAL INFORMATION: Cardiac arrest, alteration of consciousness.    TECHNIQUE:  Serial axial images were obtained from the skull base to the   vertex without intravenous contrast. Coronal and sagittal reformatted   images were obtained.    COMPARISON EXAMINATION: None.    FINDINGS:      VENTRICLES AND SULCI:  Prominence of the ventricles and sulci, probably   on the basis of diffuse cerebral volume loss.  INTRA-AXIAL:  No acute intracranial hemorrhage, mass effect, or evidence   of acute territorial infarct. Small-vessel white matter ischemic changes   are present.  EXTRA-AXIAL:  No mass or collection is seen.  VISUALIZED SINUSES:  Clear.  VISUALIZED MASTOIDS:  Clear.  CALVARIUM:  Normal.  MISCELLANEOUS:  None.    IMPRESSION:    No acute intracranial hemorrhage, mass effect, or evidence of acute   territorial infarct.   If clinical symptoms persist, recommend follow-up imaging with   contrast-enhanced MRI if there are no contraindications.    CRITICAL CARE TIME SPENT: 40 minutes IV Contrast (Unknown)                                                             Code Status:FULL  [facility] Patient is a 68y old  Female who presents with a chief complaint of Unresponsive/Cardiac Arrest (17 Mar 2017 11:27)      BRIEF HOSPITAL COURSE: admitted s/p cardiac arrest, likely secondary to hypoglycemia induced hypercapnia (acute on chronic) from stupor causing increased myocardial demand. ROSC with return of mental status, therefore no modified hypothermia, intubated 3/17 extubated 3/18, on dobutamine infusion for cardiogenic shock  Events last 24 hours: extubated 3/18 @ 0645 to NIV; on dobutamine  Review of systems:     unable to fully assess secondary to being on NIV; complaints of pain at chest compression site (sternum)     PAST MEDICAL & SURGICAL HISTORY:  Diabetes  Kidney disease  Hypertension  No significant past surgical history        Medications:    DOBUTamine Infusion 2MICROgram(s)/kG/Min IV Continuous <Continuous>      fentaNYL    Injectable 50MICROGram(s) IV Push every 1 hour PRN      heparin  Injectable 5000Unit(s) SubCutaneous every 12 hours  aspirin  chewable 81milliGRAM(s) Oral daily        dextrose Gel 1Dose(s) Oral once PRN  dextrose 50% Injectable 12.5Gram(s) IV Push once  dextrose 50% Injectable 25Gram(s) IV Push once  dextrose 50% Injectable 25Gram(s) IV Push once  glucagon  Injectable 1milliGRAM(s) IntraMuscular once PRN  atorvastatin 20milliGRAM(s) Oral at bedtime  insulin lispro (HumaLOG) corrective regimen sliding scale  SubCutaneous every 6 hours        lidocaine   Patch 1Patch Transdermal every 72 hours        Mode: AC/ CMV (Assist Control/ Continuous Mandatory Ventilation)  RR (machine): 22  TV (machine): 300  FiO2: 30  PEEP: 5  MAP: 9  PIP: 25      Adult Advanced Hemodynamics Last 24 Hrs  CVP(mm Hg): --  CVP(cm H2O): --  CO: --  CI: --  PA: --  PA(mean): --  PCWP: --  SVR: --  SVRI: --  PVR: --  PVRI: --      ICU Vital Signs Last 24 Hrs  T(C): 37.7, Max: 37.8 (03-18 @ 06:00)  T(F): 99.9, Max: 100 (03-18 @ 06:00)  HR: 87 (69 - 104)  BP: 109/47 (109/47 - 116/57)  BP(mean): 68 (68 - 82)  ABP: 116/42 (95/26 - 147/50)  ABP(mean): 67 (49 - 85)  RR: 30 (11 - 39)  SpO2: 99% (91% - 100%)    CAPILLARY BLOOD GLUCOSE  315 (18 Mar 2017 12:00)  260 (18 Mar 2017 06:00)  160 (18 Mar 2017 00:00)  125 (17 Mar 2017 18:00)  113 (17 Mar 2017 14:00)            LABS:  CBC Full  -  ( 17 Mar 2017 08:47 )  WBC Count : 12.1 K/uL  Hemoglobin : 12.7 g/dL  Hematocrit : 39.6 %  Platelet Count - Automated : 143 K/uL  Mean Cell Volume : 91.0 fl  Mean Cell Hemoglobin : 29.2 pg  Mean Cell Hemoglobin Concentration : 32.1 g/dL  Auto Neutrophil # : 6.2 K/uL  Auto Lymphocyte # : 4.7 K/uL  Auto Monocyte # : 0.6 K/uL  Auto Eosinophil # : 0.3 K/uL  Auto Basophil # : 0.0 K/uL  Auto Neutrophil % : 51.5 %  Auto Lymphocyte % : 39.1 %  Auto Monocyte % : 4.6 %  Auto Eosinophil % : 2.8 %  Auto Basophil % : 0.3 %    18 Mar 2017 06:25    137    |  89     |  76.0   ----------------------------<  247    4.2     |  36.0   |  2.44     Ca    8.9        18 Mar 2017 06:25  Phos  5.7       18 Mar 2017 06:25  Mg     2.0       18 Mar 2017 06:25    TPro  6.2    /  Alb  3.1    /  TBili  0.6    /  DBili  x      /  AST  184    /  ALT  90     /  AlkPhos  95     17 Mar 2017 15:02      CARDIAC MARKERS ( 17 Mar 2017 15:02 )  x     / 0.40 ng/mL / x     / x     / x      CARDIAC MARKERS ( 17 Mar 2017 08:47 )  x     / 0.10 ng/mL / x     / x     / x           PT/INR - ( 17 Mar 2017 09:43 )   PT: 13.1 sec;   INR: 1.19 ratio         PTT - ( 17 Mar 2017 09:43 )  PTT:34.7 sec  Urinalysis Basic - ( 17 Mar 2017 15:03 )    Color: Yellow / Appearance: Clear / S.010 / pH: x  Gluc: x / Ketone: Negative  / Bili: Negative / Urobili: Negative mg/dL   Blood: x / Protein: Negative mg/dL / Nitrite: Negative   Leuk Esterase: Trace / RBC: x / WBC 0-2   Sq Epi: x / Non Sq Epi: Occasional / Bacteria: x          CULTURES:      Physical Examination:    General: No acute distress.  Alert, interactive. Follows simple instructions    HEENT: Atraumatic, MMM, Pupils equal, reactive to light.  Symmetric. NIV    PULM: reduced at bases, clear anterior    CVS: Regular rate and rhythm, MODESTO, rubs; S1/S2. No JVD/HJR    ABD: Soft, nondistended, nontender, normoactive bowel sounds, no masses    EXT: No edema, nontender. Distal pulses 2+ equal bilaterally; left subclav tlc c/d/i; arterial line c/d/i    SKIN: Warm and well perfused, no rashes noted.    Neuro: nonfocal, moves all extremities.     RADIOLOGY:    EXAM:  CT BRAIN                          PROCEDURE DATE:  2017        INTERPRETATION:  EXAMINATION DATE: 2017 at 1127 hours.  CLINICAL INFORMATION: Cardiac arrest, alteration of consciousness.    TECHNIQUE:  Serial axial images were obtained from the skull base to the   vertex without intravenous contrast. Coronal and sagittal reformatted   images were obtained.    COMPARISON EXAMINATION: None.    FINDINGS:      VENTRICLES AND SULCI:  Prominence of the ventricles and sulci, probably   on the basis of diffuse cerebral volume loss.  INTRA-AXIAL:  No acute intracranial hemorrhage, mass effect, or evidence   of acute territorial infarct. Small-vessel white matter ischemic changes   are present.  EXTRA-AXIAL:  No mass or collection is seen.  VISUALIZED SINUSES:  Clear.  VISUALIZED MASTOIDS:  Clear.  CALVARIUM:  Normal.  MISCELLANEOUS:  None.    IMPRESSION:    No acute intracranial hemorrhage, mass effect, or evidence of acute   territorial infarct.   If clinical symptoms persist, recommend follow-up imaging with   contrast-enhanced MRI if there are no contraindications.    CRITICAL CARE TIME SPENT: 45 minutes

## 2017-03-18 NOTE — PROGRESS NOTE ADULT - PROBLEM SELECTOR PLAN 4
- now becoming hyperglycemia, increased NISS; may require insulin infusion in time. will add long acting tonight. check a1c in am if not already checked.

## 2017-03-19 NOTE — PROGRESS NOTE ADULT - SUBJECTIVE AND OBJECTIVE BOX
INTERVAL HISTORY: Feels better, denies CP, palpitation  	  MEDICATIONS:  ALBUTerol/ipratropium for Nebulization 3milliLiter(s) Nebulizer every 6 hours  HYDROmorphone  Injectable 0.25milliGRAM(s) IV Push every 4 hours PRN  Pantoprazole    Tablet 40milliGRAM(s) Oral before breakfast  famotidine    Tablet 20milliGRAM(s) Oral at bedtime  dextrose Gel 1Dose(s) Oral once PRN  dextrose 50% Injectable 12.5Gram(s) IV Push once  dextrose 50% Injectable 25Gram(s) IV Push once  dextrose 50% Injectable 25Gram(s) IV Push once  glucagon  Injectable 1milliGRAM(s) IntraMuscular once PRN  atorvastatin 20milliGRAM(s) Oral at bedtime  insulin lispro (HumaLOG) corrective regimen sliding scale  SubCutaneous Before meals and at bedtime  insulin glargine Injectable (LANTUS) 5Unit(s) SubCutaneous at bedtime  heparin  Injectable 5000Unit(s) SubCutaneous every 12 hours  aspirin  chewable 81milliGRAM(s) Oral daily  lidocaine   Patch 1Patch Transdermal every 72 hours  lidocaine   Patch 1Patch Transdermal daily        PHYSICAL EXAM:  T(C): 36.7, Max: 37.3 (03-19 @ 00:00)  HR: 80 (78 - 127)  BP: 114/78 (93/51 - 152/91)  RR: 20 (0 - 36)  SpO2: 93% (66% - 100%)  Wt(kg): --  I&O's Summary    I & Os for current day (as of 19 Mar 2017 18:25)  =============================================  IN: 323.9 ml / OUT: 200 ml / NET: 123.9 ml        Appearance: Normal	  Cardiovascular: Normal S1 S2, No JVD, No murmurs, No edema  Respiratory: Lungs clear to auscultation	  Psychiatry: A & O x 3, Mood & affect appropriate  Gastrointestinal:  Soft, Non-tender, + BS	  Skin: No rashes, No ecchymoses, No cyanosis  Extremities: Normal range of motion, No clubbing, cyanosis or edema  Vascular: Peripheral pulses palpable 2+ bilaterally    TELEMETRY: SR first degree AVB 	                            9.9    12.9  )-----------( 107      ( 19 Mar 2017 06:00 )             30.3     19 Mar 2017 06:00    140    |  91     |  85.0   ----------------------------<  148    4.5     |  36.0   |  2.75     Ca    9.1        19 Mar 2017 06:00  Phos  4.4       19 Mar 2017 06:00  Mg     2.3       19 Mar 2017 06:00      proBNP:   Lipid Profile:   HgA1c:   TSH:     ASSESSMENT/PLAN: 	68F with hx of DM, HTN, CRF, CHF (diastolic). EMS arrived and she was found in PEA. ROSC with resusitation. PEA in ER, resuscitation was resumed and again there was ROSC, s/p Levophed, severe respiratory acidosis. S/P Vent support, now extubated.  She had been complaining of general lethargy and weakness.  Will need cardiac catherization on this admission when she is optimized from renal standpoint 2/2 evidence of RI.

## 2017-03-19 NOTE — CONSULT NOTE ADULT - ASSESSMENT
A  Diabetic nephropathy  Cardiomyopathy (EF=30) with diastolic dysfunction  H/O asthma though suspect GERD with Bronchospasm        (followed by Dr Fisher in the past)  [facility]  Likely won't qualify for home bipap  P  Neb  Noct Bipap for now  Antacid and nocturnal truncal elevation  FU baseline ABG

## 2017-03-19 NOTE — CONSULT NOTE ADULT - ASSESSMENT
CRF(late III-early IV): s/p cardiac and respiratory arrest  Pt clinically stable now post extubation  - will check office records re: baseline Screat  - avoid potential nephrotoxic agents  - if pt requires cardiac cath she is at high risk for RCN and will need pre and post procedure prophylaxis

## 2017-03-19 NOTE — PROGRESS NOTE ADULT - PROBLEM SELECTOR PLAN 1
s/p intubation but still with hypercapnia  continue O2 via francisco HANNA for wheezing  Dr. Matthew consulting  Dr. Ovalles also consulting-pt may go for cath, undecided

## 2017-03-19 NOTE — CHART NOTE - NSCHARTNOTEFT_GEN_A_CORE
CCM:     initially admitted with cardiac arrest, likely from hypercarbia and hypoglycemia, developed cardiogenic shock and respiratory failure (intubated 3/17-3/18); was on dobutamine now off. Cardiology, nephrology, and pulmonary following. Currently awake/alert, tolerating PO and following instructions; greatest concern from patient is sternal pain from CPR. I spoke with Dr. Almanza @ 0900 about active issues and continued medical therapy for the patient. CCM:     initially admitted with cardiac arrest, likely from hypercarbia and hypoglycemia, developed cardiogenic shock and respiratory failure (intubated 3/17-3/18); was on dobutamine now off. Cardiology, nephrology, and pulmonary following. Currently awake/alert, tolerating PO and following instructions; greatest concern from patient is sternal pain from CPR. I spoke with Dr. Rosa @ 0900 about active issues and continued medical therapy for the patient.

## 2017-03-19 NOTE — PROGRESS NOTE ADULT - SUBJECTIVE AND OBJECTIVE BOX
Hospitalist Progress Note - Dr. Rosa       ICU transfer to 4T  PMD: Juan Lucas  Pulm: [facility]  Patient is a 76y old  Female who presents with a chief complaint of Unresponsive/Cardiac Arrest (17 Mar 2017 11:27)    HPI:  68F Pmhx of DM, HTN, CRF, CHF (diastolic). Found lethargic at home this morning by her son. Her BG was 22, the son attempted to give her juice,, but she collapsed. EMS arrived and she was found in PEA. Resuscitation was undertaken, after several rounds of Epinephrine there was ROSC. Upon arrival to the ER she again developed PEA, resuscitation was resumed and again there was ROSC,  however she was in shock and placed on Levophed. Initial ABG showed a severe respiratory acidosis. She was on full vent support and settings were adjusted. She was spontaneously breathing but had no spontaneous movement and was not responding to her name, so the modified hypothermia protocol was initiated. EKG showed no acute St/T changes. In speaking with the patient's son, she had been complaining of general lethargy and weakness. No recent chest pain, cough fever. (17 Mar 2017 11:27)  Today co of pleuritic chest pain and pain over sternum on palpation.   PHYSICAL EXAM:      Constitutional: alert, NAD, looks acutely ill    Eyes: PERRLA, EOMI    ENMT: erythema, no exudates    Neck: supple, no LN    Respiratory: R sided wheezing    Cardiovascular: S1, S2    Gastrointestinal: soft, NT/ND +Bs    Extremities: no edema, no cyanosis    Vascular: pulses b/l LE 1+    Neurological:AAOx3, cn intact    Skin: L sided catheter    Lymph Nodes: nl    Musculoskeletal: strength 4/5    Psychiatric:        MEDICATIONS  (STANDING):  heparin  Injectable 5000Unit(s) SubCutaneous every 12 hours  dextrose 50% Injectable 12.5Gram(s) IV Push once  dextrose 50% Injectable 25Gram(s) IV Push once  dextrose 50% Injectable 25Gram(s) IV Push once  aspirin  chewable 81milliGRAM(s) Oral daily  atorvastatin 20milliGRAM(s) Oral at bedtime  lidocaine   Patch 1Patch Transdermal every 72 hours  insulin lispro (HumaLOG) corrective regimen sliding scale  SubCutaneous Before meals and at bedtime  insulin glargine Injectable (LANTUS) 5Unit(s) SubCutaneous at bedtime  ALBUTerol/ipratropium for Nebulization 3milliLiter(s) Nebulizer every 6 hours  lidocaine   Patch 1Patch Transdermal daily    MEDICATIONS  (PRN):  dextrose Gel 1Dose(s) Oral once PRN Blood Glucose LESS THAN 70 milliGRAM(s)/deciLiter  glucagon  Injectable 1milliGRAM(s) IntraMuscular once PRN Glucose <70 milliGRAM(s)/deciLiter  HYDROmorphone  Injectable 0.25milliGRAM(s) IV Push every 4 hours PRN Severe Pain (7 - 10)    Vital Signs Last 24 Hrs  T(C): 36.9, Max: 37.6 (18 @ 16:00)  T(F): 98.4, Max: 99.6 ( @ 16:00)  HR: 81 (81 - 127)  BP: 101/70 (93/51 - 152/91)  BP(mean): 82 (68 - 110)  RR: 29 (0 - 32)  SpO2: 97% (94% - 100%)  CAPILLARY BLOOD GLUCOSE  166 (19 Mar 2017 06:00)  201 (18 Mar 2017 23:00)  200 (18 Mar 2017 18:00)  315 (18 Mar 2017 12:00)    LIVER FUNCTIONS - ( 17 Mar 2017 15:02 )  Alb: 3.1 g/dL / Pro: 6.2 g/dL / ALK PHOS: 95 U/L / ALT: 90 U/L / AST: 184 U/L / GGT: x           PT/PTT/INR panel*     Urinalysis Basic - ( 17 Mar 2017 15:03 )    Color: Yellow / Appearance: Clear / S.010 / pH: x  Gluc: x / Ketone: Negative  / Bili: Negative / Urobili: Negative mg/dL   Blood: x / Protein: Negative mg/dL / Nitrite: Negative   Leuk Esterase: Trace / RBC: x / WBC 0-2   Sq Epi: x / Non Sq Epi: Occasional / Bacteria: x    CBC Full  -  ( 19 Mar 2017 06:00 )  WBC Count : 12.9 K/uL  Hemoglobin : 9.9 g/dL  Hematocrit : 30.3 %  Platelet Count - Automated : 107 K/uL  Mean Cell Volume : 88.3 fl  Mean Cell Hemoglobin : 28.9 pg  Mean Cell Hemoglobin Concentration : 32.7 g/dL  Auto Neutrophil # : x  Auto Lymphocyte # : x  Auto Monocyte # : x  Auto Eosinophil # : x  Auto Basophil # : x  Auto Neutrophil % : x  Auto Lymphocyte % : x  Auto Monocyte % : x  Auto Eosinophil % : x  Auto Basophil % : x  19 Mar 2017 06:00    140    |  91     |  85.0   ----------------------------<  148    4.5     |  36.0   |  2.75     Ca    9.1        19 Mar 2017 06:00  Phos  4.4       19 Mar 2017 06:00  Mg     2.3       19 Mar 2017 06:00    TPro  6.2    /  Alb  3.1    /  TBili  0.6    /  DBili  x      /  AST  184    /  ALT  90     /  AlkPhos  95     17 Mar 2017 15:02  CARDIAC MARKERS ( 17 Mar 2017 15:02 )  x     / 0.40 ng/mL / x     / x     / x        ABG - ( 18 Mar 2017 21:52 )  pH: 7.48  /  pCO2: 54    /  pO2: 72    / HCO3: 39    / Base Excess: 15.0  /  SaO2: 95

## 2017-03-20 NOTE — PHYSICAL THERAPY INITIAL EVALUATION ADULT - IMPAIRMENTS FOUND, PT EVAL
gait, locomotion, and balance/aerobic capacity/endurance/ventilation and respiration/gas exchange/muscle strength

## 2017-03-20 NOTE — PHYSICAL THERAPY INITIAL EVALUATION ADULT - PERTINENT HX OF CURRENT PROBLEM, REHAB EVAL
68F with hx of DM, HTN, CRF, CHF (diastolic). EMS arrived and she was found in PEA(pulseless electrical activity), ROSC (return of spontaneous circulation) with resuscitation. PEA in ER, resuscitation was resumed and again there was ROSC, s/p Levophed, severe respiratory acidosis. S/P Vent support, now extubated. Pt admitted for Cardiac arrest

## 2017-03-20 NOTE — PHYSICAL THERAPY INITIAL EVALUATION ADULT - GENERAL OBSERVATIONS, REHAB EVAL
pt received supine in bed, + telemetry + O2nc, son at bedside. Pt with c/o pain in chest with coughing "from CPR compressions" Pt agreeable to PT

## 2017-03-20 NOTE — PROGRESS NOTE ADULT - SUBJECTIVE AND OBJECTIVE BOX
Internal Medicine Hospitalist - Dr. Master CARDENAS    [id number]    76y      Female    Patient is a 76y old  Female who presents with a chief complaint of Unresponsive/Cardiac Arrest (17 Mar 2017 11:27)    HPI:  68F whose name in Christina Cardenas, Pmhx of DM, HTN, CRF, CHF (diastolic). Found lethargic at home this morning by her son. Her BG was 22, the son attempted to give her juice,, but she collapsed. EMS arrived and she was found in PEA. Resuscitation was undertaken, after several rounds of Epinephrine there was ROSC. Upon arrival to the ER she again developed PEA, resuscitation was resumed and again there was ROSC,  however she was in shock and placed on Levophed. Initial ABG showed a severe respiratory acidosis. She was on full vent support and settings were adjusted. She was spontaneously breathing but had no spontaneous movement and was not responding to her name, so the modified hypothermia protocol was initiated. EKG showed no acute St/T changes. In speaking with the patient's son, she had been complaining of general lethargy and weakness. No recent chest pain, cough fever. (17 Mar 2017 11:27)      INTERVAL HPI/ OVERNIGHT EVENTS: Patient is seen and examined, pt report she is feeling weak, tired, report chest soreness, also c/o exertional SOB, requiring additional oxygen to maintain saturation. Pt son was present bedside, update d    REVIEW OF SYSTEMS:    Denied fever, chills, abd. pain, nausea, vomiting, headache, dizziness    PHYSICAL EXAM:    Vital Signs Last 24 Hrs  T(C): 36.9, Max: 37.4 (03-19 @ 20:37)  T(F): 98.4, Max: 99.3 (03-19 @ 20:37)  HR: 88 (78 - 88)  BP: 141/64 (114/46 - 141/64)  BP(mean): 85 (82 - 85)  RR: 20 (20 - 36)  SpO2: 99% (66% - 100%)    GENERAL: NAD  HEENT: EOMI  Neck: supple  CHEST/LUNG: positive air entry b/l   HEART: S1S2+ audible  ABDOMEN: Soft, Nontender, Nondistended; Bowel sounds present  EXTREMITIES:  trace edema  CNS: AAO X 3  Psychiatry: normal mood    LABS:                        9.9    12.9  )-----------( 107      ( 19 Mar 2017 06:00 )             30.3     19 Mar 2017 06:00    140    |  91     |  85.0   ----------------------------<  148    4.5     |  36.0   |  2.75     Ca    9.1        19 Mar 2017 06:00  Phos  4.4       19 Mar 2017 06:00  Mg     2.3       19 Mar 2017 06:00        MEDICATIONS  (STANDING):  heparin  Injectable 5000Unit(s) SubCutaneous every 12 hours  dextrose 50% Injectable 12.5Gram(s) IV Push once  dextrose 50% Injectable 25Gram(s) IV Push once  dextrose 50% Injectable 25Gram(s) IV Push once  aspirin  chewable 81milliGRAM(s) Oral daily  atorvastatin 20milliGRAM(s) Oral at bedtime  lidocaine   Patch 1Patch Transdermal every 72 hours  insulin lispro (HumaLOG) corrective regimen sliding scale  SubCutaneous Before meals and at bedtime  insulin glargine Injectable (LANTUS) 5Unit(s) SubCutaneous at bedtime  ALBUTerol/ipratropium for Nebulization 3milliLiter(s) Nebulizer every 6 hours  lidocaine   Patch 1Patch Transdermal daily  pantoprazole    Tablet 40milliGRAM(s) Oral before breakfast  famotidine    Tablet 20milliGRAM(s) Oral at bedtime  docusate sodium 100milliGRAM(s) Oral three times a day  senna 2Tablet(s) Oral at bedtime  carvedilol 6.25milliGRAM(s) Oral every 12 hours  cefTRIAXone   IVPB  IV Intermittent   azithromycin  IVPB  IV Intermittent   methylPREDNISolone sodium succinate Injectable 40milliGRAM(s) IV Push every 8 hours  azithromycin  IVPB 500milliGRAM(s) IV Intermittent once  cefTRIAXone   IVPB 1Gram(s) IV Intermittent once    MEDICATIONS  (PRN):  dextrose Gel 1Dose(s) Oral once PRN Blood Glucose LESS THAN 70 milliGRAM(s)/deciLiter  glucagon  Injectable 1milliGRAM(s) IntraMuscular once PRN Glucose <70 milliGRAM(s)/deciLiter  HYDROmorphone  Injectable 0.25milliGRAM(s) IV Push every 4 hours PRN Severe Pain (7 - 10)  ALBUTerol    0.083% 2.5milliGRAM(s) Nebulizer every 3 hours PRN Shortness of Breath and/or Wheezing      RADIOLOGY & ADDITIONAL TEST

## 2017-03-20 NOTE — PROGRESS NOTE ADULT - SUBJECTIVE AND OBJECTIVE BOX
INTERVAL HISTORY: On Bipap  	  MEDICATIONS:  ALBUTerol/ipratropium for Nebulization 3milliLiter(s) Nebulizer every 6 hours  HYDROmorphone  Injectable 0.25milliGRAM(s) IV Push every 4 hours PRN  pantoprazole    Tablet 40milliGRAM(s) Oral before breakfast  famotidine    Tablet 20milliGRAM(s) Oral at bedtime  docusate sodium 100milliGRAM(s) Oral three times a day  senna 2Tablet(s) Oral at bedtime  dextrose Gel 1Dose(s) Oral once PRN  dextrose 50% Injectable 12.5Gram(s) IV Push once  dextrose 50% Injectable 25Gram(s) IV Push once  dextrose 50% Injectable 25Gram(s) IV Push once  glucagon  Injectable 1milliGRAM(s) IntraMuscular once PRN  atorvastatin 20milliGRAM(s) Oral at bedtime  insulin lispro (HumaLOG) corrective regimen sliding scale  SubCutaneous Before meals and at bedtime  insulin glargine Injectable (LANTUS) 5Unit(s) SubCutaneous at bedtime  heparin  Injectable 5000Unit(s) SubCutaneous every 12 hours  aspirin  chewable 81milliGRAM(s) Oral daily  lidocaine   Patch 1Patch Transdermal every 72 hours  lidocaine   Patch 1Patch Transdermal daily        PHYSICAL EXAM:  T(C): 36.9, Max: 37.4 (03-19 @ 20:37)  HR: 84 (78 - 85)  BP: 141/64 (112/54 - 144/62)  RR: 20 (18 - 36)  SpO2: 94% (66% - 100%)  Wt(kg): --  I&O's Summary        Appearance: Normal	  Cardiovascular: Normal S1 S2, No JVD, No murmurs, No edema  Respiratory: Lungs clear to auscultation	  Gastrointestinal:  Soft, Non-tender, + BS	  Skin: No rashes, No ecchymoses, No cyanosis  Extremities: Normal range of motion, No clubbing, cyanosis or edema  Vascular: Peripheral pulses palpable 2+ bilaterally    TELEMETRY:SR   OTHER: 	    LABS:	 	    CARDIAC MARKERS:                      9.9    12.9  )-----------( 107      ( 19 Mar 2017 06:00 )             30.3     19 Mar 2017 06:00    140    |  91     |  85.0   ----------------------------<  148    4.5     |  36.0   |  2.75     Ca    9.1        19 Mar 2017 06:00  Phos  4.4       19 Mar 2017 06:00  Mg     2.3       19 Mar 2017 06:00      ASSESSMENT/PLAN: 	68F with hx of DM, HTN, CRF, CHF (diastolic). EMS arrived and she was found in PEA. ROSC with resuscitation. PEA in ER, resuscitation was resumed and again there was ROSC, s/p Levophed, severe respiratory acidosis. S/P Vent support, now extubated.  She had been complaining of general lethargy and weakness.  Cardiac catherization on this admission when she is optimized from renal standpoint 2/2 evidence of RI. 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 Now Echo EF of 30-35%

## 2017-03-20 NOTE — PHYSICAL THERAPY INITIAL EVALUATION ADULT - FOLLOWS COMMANDS/ANSWERS QUESTIONS, REHAB EVAL
Other (Free Text): The E/M service provided during this visit was medically necessary, significant, and independent from the procedure(s) provided on the same date of service and included documentd elements above and beyond the usual pre-, intra-, and post-operative work associated with the procedure(s). Detail Level: Simple Note Text (......Xxx Chief Complaint.): This diagnosis correlates with the 100% of the time

## 2017-03-20 NOTE — PROGRESS NOTE ADULT - ASSESSMENT
CRF(late III-early IV): s/p cardiac and respiratory arrest  ARF due to ATN   Elevated BUN in part due to steroids  Pt clinically stable now post extubation  - avoid potential nephrotoxic agents  -  pt requires cardiac cath, she is at high risk for RCN and will need pre and post procedure prophylaxis; if not urgent would wait until renal function improved and at baseline

## 2017-03-20 NOTE — PROGRESS NOTE ADULT - SUBJECTIVE AND OBJECTIVE BOX
NEPHROLOGY INTERVAL HPI/OVERNIGHT EVENTS:  pt seated in bed  no sob; + chest pains with movement    MEDICATIONS  (STANDING):  heparin  Injectable 5000Unit(s) SubCutaneous every 12 hours  dextrose 50% Injectable 12.5Gram(s) IV Push once  dextrose 50% Injectable 25Gram(s) IV Push once  dextrose 50% Injectable 25Gram(s) IV Push once  aspirin  chewable 81milliGRAM(s) Oral daily  atorvastatin 20milliGRAM(s) Oral at bedtime  lidocaine   Patch 1Patch Transdermal every 72 hours  insulin lispro (HumaLOG) corrective regimen sliding scale  SubCutaneous Before meals and at bedtime  insulin glargine Injectable (LANTUS) 5Unit(s) SubCutaneous at bedtime  ALBUTerol/ipratropium for Nebulization 3milliLiter(s) Nebulizer every 6 hours  lidocaine   Patch 1Patch Transdermal daily  pantoprazole    Tablet 40milliGRAM(s) Oral before breakfast  famotidine    Tablet 20milliGRAM(s) Oral at bedtime  docusate sodium 100milliGRAM(s) Oral three times a day  senna 2Tablet(s) Oral at bedtime  carvedilol 6.25milliGRAM(s) Oral every 12 hours  cefTRIAXone   IVPB  IV Intermittent   azithromycin  IVPB  IV Intermittent   methylPREDNISolone sodium succinate Injectable 40milliGRAM(s) IV Push every 8 hours    MEDICATIONS  (PRN):  dextrose Gel 1Dose(s) Oral once PRN Blood Glucose LESS THAN 70 milliGRAM(s)/deciLiter  glucagon  Injectable 1milliGRAM(s) IntraMuscular once PRN Glucose <70 milliGRAM(s)/deciLiter  HYDROmorphone  Injectable 0.25milliGRAM(s) IV Push every 4 hours PRN Severe Pain (7 - 10)  ALBUTerol    0.083% 2.5milliGRAM(s) Nebulizer every 3 hours PRN Shortness of Breath and/or Wheezing      Allergies    IV Contrast (Unknown)    Intolerances              Vital Signs Last 24 Hrs  T(C): 36.9, Max: 37.4 (03-19 @ 20:37)  T(F): 98.4, Max: 99.3 (03-19 @ 20:37)  HR: 88 (78 - 88)  BP: 141/64 (114/46 - 144/62)  BP(mean): 85 (82 - 89)  RR: 20 (20 - 36)  SpO2: 99% (66% - 100%)    PHYSICAL EXAM:  GENERAL: NAD, generalized weakness  HEAD:  Atraumatic, Normocephalic  EYES: EOMI, PERRLA, conjunctiva and sclera clear  NECK: Supple, No JVD, Normal thyroid  NERVOUS SYSTEM:  Alert & Oriented X3,  intact and symmetric  CHEST/LUNG: Clear to percussion bilaterally; No rales, rhonchi, wheezing, or rubs  HEART: Regular rate and rhythm; No rub               Pain to palpation over chest wall  ABDOMEN: Soft, Nontender, Nondistended; Bowel sounds present  EXTREMITIES:  2+ Peripheral Pulses, No clubbing, cyanosis, or edema  LYMPH: No lymphadenopathy noted  SKIN: No rashes or lesions    LABS:                        9.9    12.9  )-----------( 107      ( 19 Mar 2017 06:00 )             30.3     19 Mar 2017 06:00    140    |  91     |  85.0   ----------------------------<  148    4.5     |  36.0   |  2.75     Ca    9.1        19 Mar 2017 06:00  Phos  4.4       19 Mar 2017 06:00  Mg     2.3       19 Mar 2017 06:00              RADIOLOGY & ADDITIONAL TESTS:

## 2017-03-20 NOTE — CONSULT NOTE ADULT - PROBLEM SELECTOR RECOMMENDATION 5
Met with patient, son at bedside. She is fairly independent though son oversees her care. No reported past hospitalizations in the last 2 years.  Further advance directives and goals of care discussion as hospital course transpires.

## 2017-03-20 NOTE — PHYSICAL THERAPY INITIAL EVALUATION ADULT - ADDITIONAL COMMENTS
Pt lives in a private home with her son. 3 steps to enter with handrails, no steps inside. Pt's son works as a teacher, pt is home alone for approx. 6-7 hours a day. Pt was independent PTA with RW or SAC. Pt owns RW, SAC and shower chair.

## 2017-03-20 NOTE — PHYSICAL THERAPY INITIAL EVALUATION ADULT - CRITERIA FOR SKILLED THERAPEUTIC INTERVENTIONS
anticipated equipment needs at discharge/rehab potential/Acute rehab vs [facility]/anticipated discharge recommendation/impairments found

## 2017-03-20 NOTE — PROGRESS NOTE ADULT - SUBJECTIVE AND OBJECTIVE BOX
PULMONARY PROGRESS NOTE      SOCORRO JARVIS  MRN-[id number]    Patient is a 76y old  Female who presents with a chief complaint of Unresponsive/Cardiac Arrest (17 Mar 2017 11:27)      INTERVAL HPI/OVERNIGHT EVENTS:    Patient is awake and alert; comfortable   Son at bedside reports one episode of mild hemoptysis yesterday but without recurrence  Persistent cough, improved SOB  S/p PEA arrest; s/p extubation    MEDICATIONS  (STANDING):  heparin  Injectable 5000Unit(s) SubCutaneous every 12 hours  dextrose 50% Injectable 12.5Gram(s) IV Push once  dextrose 50% Injectable 25Gram(s) IV Push once  dextrose 50% Injectable 25Gram(s) IV Push once  aspirin  chewable 81milliGRAM(s) Oral daily  atorvastatin 20milliGRAM(s) Oral at bedtime  lidocaine   Patch 1Patch Transdermal every 72 hours  insulin lispro (HumaLOG) corrective regimen sliding scale  SubCutaneous Before meals and at bedtime  insulin glargine Injectable (LANTUS) 5Unit(s) SubCutaneous at bedtime  ALBUTerol/ipratropium for Nebulization 3milliLiter(s) Nebulizer every 6 hours  lidocaine   Patch 1Patch Transdermal daily  pantoprazole    Tablet 40milliGRAM(s) Oral before breakfast  famotidine    Tablet 20milliGRAM(s) Oral at bedtime  docusate sodium 100milliGRAM(s) Oral three times a day  senna 2Tablet(s) Oral at bedtime  carvedilol 6.25milliGRAM(s) Oral every 12 hours      MEDICATIONS  (PRN):  dextrose Gel 1Dose(s) Oral once PRN Blood Glucose LESS THAN 70 milliGRAM(s)/deciLiter  glucagon  Injectable 1milliGRAM(s) IntraMuscular once PRN Glucose <70 milliGRAM(s)/deciLiter  HYDROmorphone  Injectable 0.25milliGRAM(s) IV Push every 4 hours PRN Severe Pain (7 - 10)      Allergies    IV Contrast (Unknown)    Intolerances        PAST MEDICAL & SURGICAL HISTORY:  Diabetes  Kidney disease  Hypertension  No significant past surgical history        REVIEW OF SYSTEMS:    CONSTITUTIONAL:  No distress    HEENT:  Eyes:  No diplopia or blurred vision. ENT:  No earache, sore throat or runny nose.    CARDIOVASCULAR:  No pressure, squeezing, tightness, heaviness or aching about the chest; no palpitations.    RESPIRATORY:  + cough, improved shortness of breath, no PND or orthopnea.    GASTROINTESTINAL:  No nausea, vomiting or diarrhea.    GENITOURINARY:  No dysuria, frequency or urgency.    NEUROLOGIC:  No paresthesias, fasciculations, seizures or weakness.    PSYCHIATRIC:  No disorder of thought or mood.    Vital Signs Last 24 Hrs  T(C): 36.9, Max: 37.4 (03-19 @ 20:37)  T(F): 98.4, Max: 99.3 (03-19 @ 20:37)  HR: 84 (78 - 85)  BP: 141/64 (114/46 - 144/62)  BP(mean): 85 (82 - 89)  RR: 20 (18 - 36)  SpO2: 94% (66% - 100%)    PHYSICAL EXAMINATION:    GENERAL: The patient is awake and alert in no apparent distress.     HEENT: Head is normocephalic and atraumatic. Extraocular muscles are intact. Mucous membranes are moist.    NECK: Supple.    LUNGS: Clear to auscultation without wheezing, rales or rhonchi; respirations unlabored    HEART: Regular rate and rhythm without murmur.    ABDOMEN: Soft, nontender, and nondistended.      EXTREMITIES: Without any cyanosis, clubbing, rash, lesions or edema.    NEUROLOGIC: Grossly intact.    LABS:                        9.9    12.9  )-----------( 107      ( 19 Mar 2017 06:00 )             30.3     19 Mar 2017 06:00    140    |  91     |  85.0   ----------------------------<  148    4.5     |  36.0   |  2.75     Ca    9.1        19 Mar 2017 06:00  Phos  4.4       19 Mar 2017 06:00  Mg     2.3       19 Mar 2017 06:00          ABG - ( 18 Mar 2017 21:52 )  pH: 7.48  /  pCO2: 54    /  pO2: 72    / HCO3: 39    / Base Excess: 15.0  /  SaO2: 95            RADIOLOGY & ADDITIONAL STUDIES:    EXAM:  CHEST SINGLE VIEW FRONTAL                          PROCEDURE DATE:  03/17/2017        INTERPRETATION:  EXAMINATION DATE: March 17, 2017 at 1036 hours.  CLINICAL INFORMATION: Line placement.    TECHNIQUE: Frontal view of the chest   COMPARISON: March 17, 2017 at 0808 hours.    FINDINGS:    LINES/TUBES: Endotracheal tube with tip above the kenny. Enteric tube   with tip coursing below the left hemidiaphragm and overlying the region   of the stomach. New left-sided central venous catheterwith tip overlying   the superior vena cava.  LUNGS/PLEURA: Increased bilateral perihilar opacities, possibly   cardiogenic or noncardiogenic pulmonary edema.  MEDIASTINUM: Cardiac silhouette is enlarged.  OTHER: None.    IMPRESSION:     Since March 17, 2017 at 0808 hours, increased bilateral patchy airspace   opacities, possibly cardiogenic or noncardiogenic pulmonary edema.      MELANIE OLIVAREZ M.D., ATTENDING RADIOLOGIST  This document has been electronically signed. Mar 17 2017 11:31AM    EXAM:  ECHO TRANSTHORACIC COMP W DOPP      PROCEDURE DATE:  Mar 17 2017   .      INTERPRETATION:  REPORT:    TRANSTHORACIC ECHOCARDIOGRAM REPORT           Patient Name:   [facility] Patient Location: [facility]  Medical Rec #:  [id number]   Accession #:      [id number]  Account #:                        Height:           64.0 in 162.5 cm  YOB: 1949         Weight:           130.1 lb 59.00 kg  Patient Age:    68 years          BSA:              1.63 m²  Patient Gender: F                BP:               109/59 mmHg        Date of Exam:        3/17/2017 12:58:21 PM  Sonographer:         Kristel Betancourt  Referring Physician: Roge Espinoza MD     Procedure:     2D Echo/Doppler/Color Doppler Complete.  Indications:   Cardiogenic shock - R57.0  Diagnosis:     Pericardial effusion (noninflammatory) - I31.3  Study Details: Technically adequate study.           2D AND M-MODE MEASUREMENTS (normal ranges within parentheses):  Aorta/Left Atrium:               Normal  Aortic Root (Mmode):  2.79 cm   (2.4-3.7)  AoV Cusp Separation:  1.55 cm   (1.5-2.6)  LA Volume Index      15.9 ml/m²     LV DIASTOLIC FUNCTION:  MV Peak E: 0.56 m/s e', MV Sharona: 0.03 m/s  MV Peak A: 0.65 m/s E/e' Ratio: 18.69  E/A Ratio: 0.86     SPECTRAL DOPPLER ANALYSIS (where applicable):  Tricuspid Valve and PA/RV Systolic Pressure: TR Max Velocity: 2.93 m/s RA   Pressure: 10 mmHg RVSP/PASP: 44.3 mmHg        PHYSICIAN INTERPRETATION:  Left Ventricle: The left ventricular internal cavity size is normal.  Global LV systolic function was moderately to severely decreased. Left   ventricular ejection fraction, by visual estimation, is 30 to 35%.        LV Wall Scoring:  The basal and mid anterior septum, basal and mid inferior septum, mid  anterolateral segment, and basal inferior segment are hypokinetic.     Right Ventricle: The right ventricular size is normal. RV systolic   function is normal. TV S' 0.1 m/s.  Left Atrium: Normal left atrial size. Intra-atrial septal aneurysm.  Right Atrium: The right atrium is normal in size.  Pericardium: Trivial pericardial effusion is present. The pericardial   effusion is anterior to the right ventricle.  Mitral Valve: Thickening of the anterior and posterior mitral valve   leaflets. There is moderatemitral annular calcification. Mild mitral   valve regurgitation is seen.  Tricuspid Valve: Trivial tricuspid regurgitation is visualized. Estimated   pulmonary artery systolic pressure is 44.3 mmHg assuming a right atrial   pressure of 10 mmHg, whichis consistent with mild pulmonary hypertension.  Aortic Valve: The aortic valve is trileaflet. Sclerotic aortic valve with   normal opening. No evidence of aortic valve regurgitation is seen.  Pulmonic Valve: The pulmonic valve was not well visualized.  Aorta: The aortic root is normal in size and structure.  Venous: A normal flow pattern is recorded from the right upper pulmonary   vein. Patient on Mechanical ventilation unable to assess Right Atrial   pressure.        Summary:   1. Left ventricular ejection fraction, by visual estimation, is 30 to   35%.   2. Technically adequate study.   3. Moderately to severely decreased global left ventricular systolic   function.   4. Basal and mid anterior septum, basal and mid inferior septum, mid   anterolateral segment, and basal inferior segment are abnormal as   described above.   5. Normal left ventricular internal cavity size.   6. Trivial pericardial effusion.   7. Moderate mitral annular calcification.   8. Thickening of the anterior and posterior mitral valve leaflets.   9. Sclerotic aortic valve with normal opening.  10. Estimated pulmonary artery systolic pressure is 44.3 mmHg assuming a   right atrial pressure of 10 mmHg, which is consistent with mild pulmonary   hypertension.  11. Intra-atrial septal aneurysm.     MD Soham Electronically signed on 3/17/2017 at 7:22:04 PM       [address]   This document has been electronically signed. Mar 17 2017 12:58PM

## 2017-03-20 NOTE — PROGRESS NOTE ADULT - ASSESSMENT
This is 76Y W admitted s/p cardiac arrest, likely secondary to hypoglycemia induced hypercapnia (acute on chronic) from stupor causing increased myocardial demand. ROSC with return of mental status, therefore no modified hypothermia, intubated 3/17 extubated 3/18, was on dobutamine infusion for cardiogenic shock. Pt was transfer to t, pt is being seen by cardiology, pulmonary.     A/P    >S/P cardiac arrest with successful resuscitation  appreciate cardiology and pulmo input  start coreg at lower dose 6.25, titrate as tolerated, cont, aspirin, statin  may need cardiac cath- defer to cardiology, patient had significant CKD   Pulmo started on Rocephin and azithromycin for pneumonia,   repeat CXR     >Acute on CKD - appreciate renal input  further management as per renal  f/u bmp    >Respiratory distress - appreciate pulmo input  Pulmo started on rocephin, azithromycin, solumedrol  repeat CXR    >DM- FS stable  cont, lantus plus sliding scale     DVT PPX - heparin

## 2017-03-20 NOTE — CONSULT NOTE ADULT - PROBLEM SELECTOR RECOMMENDATION 4
Sternal pain likely from chest compressions.  Lidocaine patch to area. Dilaudid IV prn. Encourage deep breaths.

## 2017-03-20 NOTE — PHYSICAL THERAPY INITIAL EVALUATION ADULT - COORDINATION ASSESSED, REHAB EVAL
Impaired heel to shin bilaterally, pt able to perform finger to thumb but only one hand at a time, could not perform simultaneously./heel to shin

## 2017-03-21 NOTE — PROGRESS NOTE ADULT - SUBJECTIVE AND OBJECTIVE BOX
NEPHROLOGY INTERVAL HPI/OVERNIGHT EVENTS:  pt doing well no acute distress  no cp, sob noted    MEDICATIONS  (STANDING):  heparin  Injectable 5000Unit(s) SubCutaneous every 12 hours  dextrose 50% Injectable 12.5Gram(s) IV Push once  dextrose 50% Injectable 25Gram(s) IV Push once  dextrose 50% Injectable 25Gram(s) IV Push once  aspirin  chewable 81milliGRAM(s) Oral daily  atorvastatin 20milliGRAM(s) Oral at bedtime  lidocaine   Patch 1Patch Transdermal every 72 hours  insulin lispro (HumaLOG) corrective regimen sliding scale  SubCutaneous Before meals and at bedtime  ALBUTerol/ipratropium for Nebulization 3milliLiter(s) Nebulizer every 6 hours  lidocaine   Patch 1Patch Transdermal daily  pantoprazole    Tablet 40milliGRAM(s) Oral before breakfast  famotidine    Tablet 20milliGRAM(s) Oral at bedtime  docusate sodium 100milliGRAM(s) Oral three times a day  senna 2Tablet(s) Oral at bedtime  carvedilol 6.25milliGRAM(s) Oral every 12 hours  cefTRIAXone   IVPB  IV Intermittent   azithromycin  IVPB  IV Intermittent   azithromycin  IVPB 500milliGRAM(s) IV Intermittent every 24 hours  cefTRIAXone   IVPB 1Gram(s) IV Intermittent every 24 hours  methylPREDNISolone sodium succinate Injectable 40milliGRAM(s) IV Push two times a day  insulin glargine Injectable (LANTUS) 10Unit(s) SubCutaneous at bedtime    MEDICATIONS  (PRN):  dextrose Gel 1Dose(s) Oral once PRN Blood Glucose LESS THAN 70 milliGRAM(s)/deciLiter  glucagon  Injectable 1milliGRAM(s) IntraMuscular once PRN Glucose <70 milliGRAM(s)/deciLiter  HYDROmorphone  Injectable 0.25milliGRAM(s) IV Push every 4 hours PRN Severe Pain (7 - 10)  ALBUTerol    0.083% 2.5milliGRAM(s) Nebulizer every 3 hours PRN Shortness of Breath and/or Wheezing  acetaminophen   Tablet. 650milliGRAM(s) Oral every 6 hours PRN Moderate Pain (4 - 6)      Allergies    IV Contrast (Unknown)          Vital Signs Last 24 Hrs  T(C): 36.8, Max: 37.1 (03-20 @ 21:59)  T(F): 98.2, Max: 98.8 (03-20 @ 21:59)  HR: 79 (77 - 84)  BP: 108/50 (108/50 - 129/57)  BP(mean): --  RR: 26 (16 - 26)  SpO2: 100% (96% - 100%)    PHYSICAL EXAM:  GENERAL: NAD, generalized weakness  HEAD:  Atraumatic, Normocephalic  EYES: EOMI, PERRLA, conjunctiva and sclera clear  NECK: Supple, No JVD, Normal thyroid  NERVOUS SYSTEM:  Alert & Oriented X3,  intact and symmetric  CHEST/LUNG: Clear to percussion bilaterally; No rales, rhonchi, wheezing, or rubs  HEART: Regular rate and rhythm; No rub               Pain to palpation over chest wall  ABDOMEN: Soft, Nontender, Nondistended; Bowel sounds present  EXTREMITIES:  2+ Peripheral Pulses, No clubbing, cyanosis, or edema  LYMPH: No lymphadenopathy noted  SKIN: No rashes or lesions      LABS:                        9.9    7.0   )-----------( 99       ( 21 Mar 2017 06:59 )             30.8     21 Mar 2017 06:59    132    |  83     |  106.0  ----------------------------<  313    5.2     |  35.0   |  3.25     Ca    9.1        21 Mar 2017 06:59  Phos  5.0       21 Mar 2017 06:59  Mg     2.4       21 Mar 2017 06:59    TPro  6.8    /  Alb  3.2    /  TBili  0.6    /  DBili  x      /  AST  69     /  ALT  66     /  AlkPhos  96     21 Mar 2017 06:59  Creatinine, Serum: 2.75 mg/dL (03.19.17 @ 06:00)          Magnesium, Serum: 2.4 mg/dL (03-21 @ 06:59)  Phosphorus Level, Serum: 5.0 mg/dL (03-21 @ 06:59)      RADIOLOGY & ADDITIONAL TESTS:

## 2017-03-21 NOTE — PROGRESS NOTE ADULT - PROBLEM SELECTOR PLAN 7
Add abx given changes seen on CXR and leukocytosis as well as mild hemoptysis  Continue drug nebs  Brief steroids for cough

## 2017-03-21 NOTE — PROGRESS NOTE ADULT - ASSESSMENT
76 yr woman with hypoglycemia BS- 22,  admitted s/p cardiac arrest, CPR administered, required temporary intubation,  with eventual  ROSC.

## 2017-03-21 NOTE — PROGRESS NOTE ADULT - ASSESSMENT
This is 76Y W admitted s/p cardiac arrest, likely secondary to hypoglycemia induced hypercapnia (acute on chronic) from stupor causing increased myocardial demand. ROSC with return of mental status, therefore no modified hypothermia, intubated 3/17 extubated 3/18, was on dobutamine infusion for cardiogenic shock. Pt was transfer to t, pt is being seen by cardiology, pulmonary and renal. Pt will need cardiac cath once renal function is better.     A/P    >S/P cardiac arrest with successful resuscitation  appreciate cardiology and pulmo input  cont. coreg at lower dose 6.25, titrate as tolerated, cont, aspirin, statin  may need cardiac cath- defer to cardiology, patient had significant acute on CKD   Pulmo started on Rocephin and azithromycin for pneumonia,     repeat CXR showed improved aeration and resolution of pulmonary edema   lidocaine patch for sternal pain - s/p CPR     >Acute on CKD - worsening   appreciate renal input  renal u/s   f/u bmp    >Respiratory distress - appreciate pulmo input  Pulmo started on Rocephin,  azithromycin, solumedrol  repeat CXR - im[proving pulmonary edema     >DM- FS elevated- [phone]  increase Lantus 10 plus sliding scale     DVT PPX - heparin

## 2017-03-21 NOTE — PROGRESS NOTE ADULT - PROBLEM SELECTOR PLAN 1
For eventual cath.   Pain likely from chest compressions- managed on current regimen, Lidocaine patch.

## 2017-03-21 NOTE — PROGRESS NOTE ADULT - SUBJECTIVE AND OBJECTIVE BOX
Internal Medicine Hospitalist - Dr. Master CARDENAS    [id number]    76y      Female    Patient is a 76y old  Female who presents with a chief complaint of Unresponsive/Cardiac Arrest (17 Mar 2017 11:27)    HPI:  68F whose name in [facility], Pmhx of DM, HTN, CRF, CHF (diastolic). Found lethargic at home this morning by her son. Her BG was 22, the son attempted to give her juice,, but she collapsed. EMS arrived and she was found in PEA. Resuscitation was undertaken, after several rounds of Epinephrine there was ROSC. Upon arrival to the ER she again developed PEA, resuscitation was resumed and again there was ROSC,  however she was in shock and placed on Levophed. Initial ABG showed a severe respiratory acidosis. She was on full vent support and settings were adjusted. She was spontaneously breathing but had no spontaneous movement and was not responding to her name, so the modified hypothermia protocol was initiated. EKG showed no acute St/T changes. In speaking with the patient's son, she had been complaining of general lethargy and weakness. No recent chest pain, cough fever. (17 Mar 2017 11:27)      INTERVAL HPI/ OVERNIGHT EVENTS: Patient is seen and examined, pt report sternal pain is better, denied SOB, abd. pain, nausea and vomiting.     REVIEW OF SYSTEMS:    Denied fever, chills, abd. pain, nausea, vomiting, headache, dizziness    PHYSICAL EXAM:    Vital Signs Last 24 Hrs  T(C): 36.8, Max: 37.1 (03-20 @ 21:59)  T(F): 98.2, Max: 98.8 (03-20 @ 21:59)  HR: 79 (77 - 85)  BP: 108/50 (108/50 - 132/70)  BP(mean): --  RR: 26 (16 - 26)  SpO2: 100% (96% - 100%)    GENERAL: NAD  HEENT: EOMI  Neck: supple  CHEST/LUNG: positive air entry   HEART: S1S2+ audible  ABDOMEN: Soft, Nontender, Nondistended; Bowel sounds present  EXTREMITIES:  trace edema  CNS: AAO X 3  Psychiatry: normal mood    LABS:                        9.9    7.0   )-----------( 99       ( 21 Mar 2017 06:59 )             30.8     21 Mar 2017 06:59    132    |  83     |  106.0  ----------------------------<  313    5.2     |  35.0   |  3.25     Ca    9.1        21 Mar 2017 06:59  Phos  5.0       21 Mar 2017 06:59  Mg     2.4       21 Mar 2017 06:59    TPro  6.8    /  Alb  3.2    /  TBili  0.6    /  DBili  x      /  AST  69     /  ALT  66     /  AlkPhos  96     21 Mar 2017 06:59            MEDICATIONS  (STANDING):  heparin  Injectable 5000Unit(s) SubCutaneous every 12 hours  dextrose 50% Injectable 12.5Gram(s) IV Push once  dextrose 50% Injectable 25Gram(s) IV Push once  dextrose 50% Injectable 25Gram(s) IV Push once  aspirin  chewable 81milliGRAM(s) Oral daily  atorvastatin 20milliGRAM(s) Oral at bedtime  lidocaine   Patch 1Patch Transdermal every 72 hours  insulin lispro (HumaLOG) corrective regimen sliding scale  SubCutaneous Before meals and at bedtime  insulin glargine Injectable (LANTUS) 5Unit(s) SubCutaneous at bedtime  ALBUTerol/ipratropium for Nebulization 3milliLiter(s) Nebulizer every 6 hours  lidocaine   Patch 1Patch Transdermal daily  pantoprazole    Tablet 40milliGRAM(s) Oral before breakfast  famotidine    Tablet 20milliGRAM(s) Oral at bedtime  docusate sodium 100milliGRAM(s) Oral three times a day  senna 2Tablet(s) Oral at bedtime  carvedilol 6.25milliGRAM(s) Oral every 12 hours  cefTRIAXone   IVPB  IV Intermittent   azithromycin  IVPB  IV Intermittent   methylPREDNISolone sodium succinate Injectable 40milliGRAM(s) IV Push every 8 hours  azithromycin  IVPB 500milliGRAM(s) IV Intermittent every 24 hours  cefTRIAXone   IVPB 1Gram(s) IV Intermittent every 24 hours    MEDICATIONS  (PRN):  dextrose Gel 1Dose(s) Oral once PRN Blood Glucose LESS THAN 70 milliGRAM(s)/deciLiter  glucagon  Injectable 1milliGRAM(s) IntraMuscular once PRN Glucose <70 milliGRAM(s)/deciLiter  HYDROmorphone  Injectable 0.25milliGRAM(s) IV Push every 4 hours PRN Severe Pain (7 - 10)  ALBUTerol    0.083% 2.5milliGRAM(s) Nebulizer every 3 hours PRN Shortness of Breath and/or Wheezing  acetaminophen   Tablet. 650milliGRAM(s) Oral every 6 hours PRN Moderate Pain (4 - 6)      RADIOLOGY & ADDITIONAL TEST  EXAM:  CHEST SINGLE VIEW FRONTAL                        PROCEDURE DATE:  03/21/2017    Impression: Improved aeration and resolution of pulmonary edema. There is   now subsegmental atelectasis and small bilateral pleural effusions at the   lung bases with cardiomegaly.

## 2017-03-21 NOTE — PROGRESS NOTE ADULT - SUBJECTIVE AND OBJECTIVE BOX
INTERVAL HPI/OVERNIGHT EVENTS:  no overnight events noted  pain to sternum little better   Son at bedside- thinks mother is little better    PRESENT SYMPTOMS: SOURCE:  Patient/Family/Team    PAIN SCALE:  0 = none  1 = mild   2 = moderate  3 = severe    Pain: 1- chest area, took Tylenol            1- abdomen    Dyspnea:  [ ] YES [ x] NO  Anxiety:  [ ] YES [ x] NO  Fatigue: [x ] YES [ ] NO  Nausea: [ x] YES- slight [ ] NO  Loss of Appetite: [ ] YES [ x] NO  Other symptoms: no vomiting, + constipation - last BM 4 days ago,  little bit today    MEDICATIONS  (STANDING):  heparin  Injectable 5000Unit(s) SubCutaneous every 12 hours  dextrose 50% Injectable 12.5Gram(s) IV Push once  dextrose 50% Injectable 25Gram(s) IV Push once  dextrose 50% Injectable 25Gram(s) IV Push once  aspirin  chewable 81milliGRAM(s) Oral daily  atorvastatin 20milliGRAM(s) Oral at bedtime  lidocaine   Patch 1Patch Transdermal every 72 hours  insulin lispro (HumaLOG) corrective regimen sliding scale  SubCutaneous Before meals and at bedtime  ALBUTerol/ipratropium for Nebulization 3milliLiter(s) Nebulizer every 6 hours  lidocaine   Patch 1Patch Transdermal daily  pantoprazole    Tablet 40milliGRAM(s) Oral before breakfast  famotidine    Tablet 20milliGRAM(s) Oral at bedtime  docusate sodium 100milliGRAM(s) Oral three times a day  senna 2Tablet(s) Oral at bedtime  carvedilol 6.25milliGRAM(s) Oral every 12 hours  cefTRIAXone   IVPB  IV Intermittent   azithromycin  IVPB  IV Intermittent   azithromycin  IVPB 500milliGRAM(s) IV Intermittent every 24 hours  cefTRIAXone   IVPB 1Gram(s) IV Intermittent every 24 hours  methylPREDNISolone sodium succinate Injectable 40milliGRAM(s) IV Push two times a day  insulin glargine Injectable (LANTUS) 10Unit(s) SubCutaneous at bedtime  sodium chloride 0.9%. 1000milliLiter(s) IV Continuous <Continuous>    MEDICATIONS  (PRN):  dextrose Gel 1Dose(s) Oral once PRN Blood Glucose LESS THAN 70 milliGRAM(s)/deciLiter  glucagon  Injectable 1milliGRAM(s) IntraMuscular once PRN Glucose <70 milliGRAM(s)/deciLiter  HYDROmorphone  Injectable 0.25milliGRAM(s) IV Push every 4 hours PRN Severe Pain (7 - 10)  ALBUTerol    0.083% 2.5milliGRAM(s) Nebulizer every 3 hours PRN Shortness of Breath and/or Wheezing  acetaminophen   Tablet. 650milliGRAM(s) Oral every 6 hours PRN Moderate Pain (4 - 6)      Allergies    IV Contrast (Unknown)    Intolerances        Karnofsky Performance Score/Palliative Performance Status Version 2:  30  %    Vital Signs Last 24 Hrs  T(C): 36.8, Max: 37.1 (03-20 @ 21:59)  T(F): 98.2, Max: 98.8 (03-20 @ 21:59)  HR: 79 (77 - 84)  BP: 108/50 (108/50 - 129/57)  BP(mean): --  RR: 26 (16 - 26)  SpO2: 100% (96% - 100%)    PHYSICAL EXAM:    General: [x ] alert  [ ] oriented x ____ [ ] lethargic [ ] agitated                  [ ] cachexia  [ ] nonverbal  [ ] coma    HEENT: [x ] normal  [ ] dry mouth  [ ] ET tube/trach    Lungs: [x ] comfortable [ ] tachypnea/labored breathing  [ ] excessive secretions    CV: [ x] normal  [ ] tachycardia    GI: + BS soft non tender non distended no rebound or guarding    : [x ] normal  [ ] incontinent  [ ] oliguria/anuria  [ ] corral    MSK: [ ] normal  [x] weakness  [ ] edema             [ ] ambulatory  [ ] bedbound/wheelchair bound    Skin: [ ] normal  [ ] pressure ulcers- Stage_____  [ ] no rash    LABS:                        9.9    7.0   )-----------( 99       ( 21 Mar 2017 06:59 )             30.8     21 Mar 2017 06:59    132    |  83     |  106.0  ----------------------------<  313    5.2     |  35.0   |  3.25     Ca    9.1        21 Mar 2017 06:59  Phos  5.0       21 Mar 2017 06:59  Mg     2.4       21 Mar 2017 06:59    TPro  6.8    /  Alb  3.2    /  TBili  0.6    /  DBili  x      /  AST  69     /  ALT  66     /  AlkPhos  96     21 Mar 2017 06:59        I&O's Summary  I & Os for 24h ending 21 Mar 2017 07:00  =============================================  IN: 1330 ml / OUT: 1230 ml / NET: 100 ml    I & Os for current day (as of 21 Mar 2017 16:19)  =============================================  IN: 120 ml / OUT: 350 ml / NET: -230 ml      RADIOLOGY & ADDITIONAL STUDIES:      EXAM:  CHEST SINGLE VIEW FRONTAL                          PROCEDURE DATE:  03/21/2017        INTERPRETATION:  History: Respiratory failure. Cardiac arrest    Technique:  AP portable    Comparisons:  Chest x-ray dated 3/17/2017    Findings: Small bilateral pleural effusions and subsegmental atelectasis   is present at the lung bases. Pulmonary edema has resolved.   The ET   tube, CVP line and nasogastric tubes have been removed. Moderate   cardiomegaly. Diffuse spondylosis and degenerative changes are present in   the thoracic spine.    Impression: Improved aeration and resolution of pulmonary edema. There is   now subsegmental atelectasis and small bilateral pleural effusions at the   lung bases with cardiomegaly.    ADVANCE DIRECTIVES:  [ ] YES [x ] NO    DNR: [ ] YES [ x] NO      Date Completed:                    MOLST [ ] YES [x ] NO   Date Completed:     COUNSELING:  Advanced Care Planning Discussion - Time Spent ______Minutes.   More than 50% time spent in counseling and coordinating care. ______ Minutes.     Thank you for the opportunity to assist with the care of this patient.   [facility] [phone].

## 2017-03-21 NOTE — PROGRESS NOTE ADULT - ASSESSMENT
CRF(late III-early IV): s/p cardiac and respiratory arrest  ARF due to ATN ==> progressive  Elevated BUN in part due to steroids  - avoid potential nephrotoxic agents  -  pt requires cardiac cath; on hold until renal function optimized  - add gentle IVF today

## 2017-03-21 NOTE — PROGRESS NOTE ADULT - SUBJECTIVE AND OBJECTIVE BOX
PULMONARY PROGRESS NOTE      SOCORRO JARVIS  MRN-[id number]    Patient is a 76y old  Female who presents with a chief complaint of Unresponsive/Cardiac Arrest (17 Mar 2017 11:27)      INTERVAL HPI/OVERNIGHT EVENTS:    Slightly improved cough and SOB; mild hemoptysis    MEDICATIONS  (STANDING):  heparin  Injectable 5000Unit(s) SubCutaneous every 12 hours  dextrose 50% Injectable 12.5Gram(s) IV Push once  dextrose 50% Injectable 25Gram(s) IV Push once  dextrose 50% Injectable 25Gram(s) IV Push once  aspirin  chewable 81milliGRAM(s) Oral daily  atorvastatin 20milliGRAM(s) Oral at bedtime  lidocaine   Patch 1Patch Transdermal every 72 hours  insulin lispro (HumaLOG) corrective regimen sliding scale  SubCutaneous Before meals and at bedtime  insulin glargine Injectable (LANTUS) 5Unit(s) SubCutaneous at bedtime  ALBUTerol/ipratropium for Nebulization 3milliLiter(s) Nebulizer every 6 hours  lidocaine   Patch 1Patch Transdermal daily  pantoprazole    Tablet 40milliGRAM(s) Oral before breakfast  famotidine    Tablet 20milliGRAM(s) Oral at bedtime  docusate sodium 100milliGRAM(s) Oral three times a day  senna 2Tablet(s) Oral at bedtime  carvedilol 6.25milliGRAM(s) Oral every 12 hours  cefTRIAXone   IVPB  IV Intermittent   azithromycin  IVPB  IV Intermittent   methylPREDNISolone sodium succinate Injectable 40milliGRAM(s) IV Push every 8 hours  azithromycin  IVPB 500milliGRAM(s) IV Intermittent every 24 hours  cefTRIAXone   IVPB 1Gram(s) IV Intermittent every 24 hours      MEDICATIONS  (PRN):  dextrose Gel 1Dose(s) Oral once PRN Blood Glucose LESS THAN 70 milliGRAM(s)/deciLiter  glucagon  Injectable 1milliGRAM(s) IntraMuscular once PRN Glucose <70 milliGRAM(s)/deciLiter  HYDROmorphone  Injectable 0.25milliGRAM(s) IV Push every 4 hours PRN Severe Pain (7 - 10)  ALBUTerol    0.083% 2.5milliGRAM(s) Nebulizer every 3 hours PRN Shortness of Breath and/or Wheezing  acetaminophen   Tablet. 650milliGRAM(s) Oral every 6 hours PRN Moderate Pain (4 - 6)      Allergies    IV Contrast (Unknown)    Intolerances        PAST MEDICAL & SURGICAL HISTORY:  Diabetes  Kidney disease  Hypertension  No significant past surgical history        REVIEW OF SYSTEMS:    CONSTITUTIONAL:  No distress    HEENT:  Eyes:  No diplopia or blurred vision. ENT:  No earache, sore throat or runny nose.    CARDIOVASCULAR:  No pressure, squeezing, tightness, heaviness or aching about the chest; no palpitations.    RESPIRATORY:  Improved cough, shortness of breath, no PND or orthopnea. Mild SOBOE; mild hemoptysis    GASTROINTESTINAL:  No nausea, vomiting or diarrhea.    GENITOURINARY:  No dysuria, frequency or urgency.    NEUROLOGIC:  No paresthesias, fasciculations, seizures or weakness.    PSYCHIATRIC:  No disorder of thought or mood.    Vital Signs Last 24 Hrs  T(C): 36.7, Max: 37.1 (03-20 @ 21:59)  T(F): 98, Max: 98.8 (03-20 @ 21:59)  HR: 78 (77 - 85)  BP: 117/76 (117/76 - 132/70)  BP(mean): --  RR: 20 (16 - 20)  SpO2: 100% (96% - 100%)    PHYSICAL EXAMINATION:    GENERAL: The patient is awake and alert in no apparent distress.     HEENT: Head is normocephalic and atraumatic. Extraocular muscles are intact. Mucous membranes are moist.    NECK: Supple.    LUNGS: Clear to auscultation with isolated wheeze, rales or rhonchi; respirations unlabored    HEART: Regular rate and rhythm without murmur.    ABDOMEN: Soft, nontender, and nondistended.      EXTREMITIES: Without any cyanosis, clubbing, rash, lesions or edema.    NEUROLOGIC: Grossly intact.    LABS:                        9.9    7.0   )-----------( 99       ( 21 Mar 2017 06:59 )             30.8     21 Mar 2017 06:59    132    |  83     |  106.0  ----------------------------<  313    5.2     |  35.0   |  3.25     Ca    9.1        21 Mar 2017 06:59  Phos  5.0       21 Mar 2017 06:59  Mg     2.4       21 Mar 2017 06:59    TPro  6.8    /  Alb  3.2    /  TBili  0.6    /  DBili  x      /  AST  69     /  ALT  66     /  AlkPhos  96     21 Mar 2017 06:59      RADIOLOGY & ADDITIONAL STUDIES:    EXAM:  CHEST SINGLE VIEW FRONTAL                          PROCEDURE DATE:  03/21/2017        INTERPRETATION:  History: Respiratory failure. Cardiac arrest    Technique:  AP portable    Comparisons:  Chest x-ray dated 3/17/2017    Findings: Small bilateral pleural effusions and subsegmental atelectasis   is present at the lung bases. Pulmonary edema has resolved.   The ET   tube, CVP line and nasogastric tubes have been removed. Moderate   cardiomegaly. Diffuse spondylosis and degenerative changes are present in   the thoracic spine.    Impression: Improved aeration and resolution of pulmonary edema. There is   now subsegmental atelectasis and small bilateral pleural effusions at the   lung bases with cardiomegaly.      MAYCOL PIRES M.D., ATTENDING RADIOLOGIST  This document has been electronically signed. Mar 21 2017 10:48AM

## 2017-03-21 NOTE — PROGRESS NOTE ADULT - SUBJECTIVE AND OBJECTIVE BOX
INTERVAL HISTORY: no new symptoms  	  MEDICATIONS:  carvedilol 6.25milliGRAM(s) Oral every 12 hours  cefTRIAXone   IVPB  IV Intermittent   azithromycin  IVPB  IV Intermittent   azithromycin  IVPB 500milliGRAM(s) IV Intermittent every 24 hours  cefTRIAXone   IVPB 1Gram(s) IV Intermittent every 24 hours  ALBUTerol/ipratropium for Nebulization 3milliLiter(s) Nebulizer every 6 hours  ALBUTerol    0.083% 2.5milliGRAM(s) Nebulizer every 3 hours PRN  HYDROmorphone  Injectable 0.25milliGRAM(s) IV Push every 4 hours PRN  acetaminophen   Tablet. 650milliGRAM(s) Oral every 6 hours PRN  pantoprazole    Tablet 40milliGRAM(s) Oral before breakfast  famotidine    Tablet 20milliGRAM(s) Oral at bedtime  docusate sodium 100milliGRAM(s) Oral three times a day  senna 2Tablet(s) Oral at bedtime  dextrose Gel 1Dose(s) Oral once PRN  dextrose 50% Injectable 12.5Gram(s) IV Push once  dextrose 50% Injectable 25Gram(s) IV Push once  dextrose 50% Injectable 25Gram(s) IV Push once  glucagon  Injectable 1milliGRAM(s) IntraMuscular once PRN  atorvastatin 20milliGRAM(s) Oral at bedtime  insulin lispro (HumaLOG) corrective regimen sliding scale  SubCutaneous Before meals and at bedtime  insulin glargine Injectable (LANTUS) 5Unit(s) SubCutaneous at bedtime  methylPREDNISolone sodium succinate Injectable 40milliGRAM(s) IV Push every 8 hours  heparin  Injectable 5000Unit(s) SubCutaneous every 12 hours  aspirin  chewable 81milliGRAM(s) Oral daily  lidocaine   Patch 1Patch Transdermal every 72 hours  lidocaine   Patch 1Patch Transdermal daily        PHYSICAL EXAM:  T(C): 36.7, Max: 37.1 (03-20 @ 21:59)  HR: 78 (77 - 85)  BP: 117/76 (117/76 - 132/70)  RR: 20 (16 - 20)  SpO2: 100% (96% - 100%)  Wt(kg): --  I&O's Summary    I & Os for current day (as of 21 Mar 2017 09:42)  =============================================  IN: 1330 ml / OUT: 1230 ml / NET: 100 ml        Appearance: Normal	    Cardiovascular: Normal S1 S2, No JVD, No murmurs, No edema  Respiratory: Lungs clear to auscultation	  Gastrointestinal:  Soft, Non-tender, + BS	  Skin: No rashes, No ecchymoses, No cyanosis  Extremities: Normal range of motion, No clubbing, cyanosis or edema  Vascular: Peripheral pulses palpable 2+ bilaterally    TELEMETRY: 	    ECG:  	  RADIOLOGY:   DIAGNOSTIC TESTING:  [ ] Echocardiogram:  [ ]  Catheterization:  [ ] Stress Test:    OTHER: 	    LABS:	 	    CARDIAC MARKERS:                        9.9    7.0   )-----------( 99       ( 21 Mar 2017 06:59 )             30.8     21 Mar 2017 06:59    132    |  83     |  106.0  ----------------------------<  313    5.2     |  35.0   |  3.25     Ca    9.1        21 Mar 2017 06:59  Phos  5.0       21 Mar 2017 06:59  Mg     2.4       21 Mar 2017 06:59    TPro  6.8    /  Alb  3.2    /  TBili  0.6    /  DBili  x      /  AST  69     /  ALT  66     /  AlkPhos  96     21 Mar 2017 06:59    proBNP:   Lipid Profile:   HgA1c:   TSH:     ASSESSMENT/PLAN: 	68F with hx of DM, HTN, CRF, CHF (diastolic). Found by EMS to be in PEA. ROSC with resuscitation.   PEA again in ER, resuscitation was resumed and again there was ROSC. S/p Levophed, severe respiratory acidosis. S/P Vent support, now extubated.    May come to cardiac cath on this admission when she is optimized from renal standpoint 2/2 evidence of RI. 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 Now Echo EF of 30-35%

## 2017-03-22 NOTE — PROGRESS NOTE ADULT - ASSESSMENT
CKD (late III-early IV): s/p cardiac and respiratory arrest  SENTHIL suspect ATN from prolonged ischemia  Systoli HF EF 35% (3/17)   Elevated BUN in part due to steroids  - avoid potential nephrotoxic agents  -  pt requires cardiac cath; on hold until renal function optimized Cr worse today  - Will cont gentle IVF one more day

## 2017-03-22 NOTE — PROGRESS NOTE ADULT - SUBJECTIVE AND OBJECTIVE BOX
Internal Medicine Hospitalist - Dr. Master CARDENAS    [id number]    76y      Female    Patient is a 76y old  Female who presents with a chief complaint of Unresponsive/Cardiac Arrest (17 Mar 2017 11:27)    HPI:  68F whose name in [facility], Pmhx of DM, HTN, CRF, CHF (diastolic). Found lethargic at home this morning by her son. Her BG was 22, the son attempted to give her juice,, but she collapsed. EMS arrived and she was found in PEA. Resuscitation was undertaken, after several rounds of Epinephrine there was ROSC. Upon arrival to the ER she again developed PEA, resuscitation was resumed and again there was ROSC,  however she was in shock and placed on Levophed. Initial ABG showed a severe respiratory acidosis. She was on full vent support and settings were adjusted. She was spontaneously breathing but had no spontaneous movement and was not responding to her name, so the modified hypothermia protocol was initiated. EKG showed no acute St/T changes. In speaking with the patient's son, she had been complaining of general lethargy and weakness. No recent chest pain, cough fever. (17 Mar 2017 11:27)      INTERVAL HPI/ OVERNIGHT EVENTS: Patient is seen and examined, pt report mild cough and SOB, report sternal pain is better today.     REVIEW OF SYSTEMS:    Denied fever, chills, abd. pain, nausea, vomiting, headache, dizziness    PHYSICAL EXAM:    Vital Signs Last 24 Hrs  T(C): 36.7, Max: 36.8 (03-21 @ 12:14)  T(F): 98, Max: 98.3 (03-21 @ 15:00)  HR: 53 (53 - 140)  BP: 120/60 (108/50 - 153/80)  BP(mean): --  RR: 18 (18 - 26)  SpO2: 99% (98% - 100%)    GENERAL: NAD  HEENT: EOMI  Neck: supple  CHEST/LUNG: few wheezing, decrease breath sounds over bases   HEART: S1S2+ audible  ABDOMEN: Soft, Nontender, Nondistended; Bowel sounds present  EXTREMITIES:  chronic skin changes   CNS: AAO X 3  Psychiatry: normal mood    LABS:                        10.3   7.9   )-----------( 108      ( 22 Mar 2017 06:36 )             28.7     22 Mar 2017 06:36    127    |  83     |  110.0  ----------------------------<  400    5.2     |  29.0   |  3.29     Ca    8.9        22 Mar 2017 06:36  Phos  5.0       21 Mar 2017 06:59  Mg     2.4       21 Mar 2017 06:59    TPro  6.8    /  Alb  3.2    /  TBili  0.6    /  DBili  x      /  AST  69     /  ALT  66     /  AlkPhos  96     21 Mar 2017 06:59            MEDICATIONS  (STANDING):  heparin  Injectable 5000Unit(s) SubCutaneous every 12 hours  dextrose 50% Injectable 12.5Gram(s) IV Push once  dextrose 50% Injectable 25Gram(s) IV Push once  dextrose 50% Injectable 25Gram(s) IV Push once  aspirin  chewable 81milliGRAM(s) Oral daily  atorvastatin 20milliGRAM(s) Oral at bedtime  lidocaine   Patch 1Patch Transdermal every 72 hours  insulin lispro (HumaLOG) corrective regimen sliding scale  SubCutaneous Before meals and at bedtime  ALBUTerol/ipratropium for Nebulization 3milliLiter(s) Nebulizer every 6 hours  lidocaine   Patch 1Patch Transdermal daily  pantoprazole    Tablet 40milliGRAM(s) Oral before breakfast  famotidine    Tablet 20milliGRAM(s) Oral at bedtime  docusate sodium 100milliGRAM(s) Oral three times a day  senna 2Tablet(s) Oral at bedtime  carvedilol 6.25milliGRAM(s) Oral every 12 hours  cefTRIAXone   IVPB  IV Intermittent   azithromycin  IVPB  IV Intermittent   azithromycin  IVPB 500milliGRAM(s) IV Intermittent every 24 hours  cefTRIAXone   IVPB 1Gram(s) IV Intermittent every 24 hours  methylPREDNISolone sodium succinate Injectable 40milliGRAM(s) IV Push two times a day  insulin glargine Injectable (LANTUS) 10Unit(s) SubCutaneous at bedtime  sodium chloride 0.9%. 1000milliLiter(s) IV Continuous <Continuous>  polyethylene glycol 3350 17Gram(s) Oral at bedtime    MEDICATIONS  (PRN):  dextrose Gel 1Dose(s) Oral once PRN Blood Glucose LESS THAN 70 milliGRAM(s)/deciLiter  glucagon  Injectable 1milliGRAM(s) IntraMuscular once PRN Glucose <70 milliGRAM(s)/deciLiter  HYDROmorphone  Injectable 0.25milliGRAM(s) IV Push every 4 hours PRN Severe Pain (7 - 10)  ALBUTerol    0.083% 2.5milliGRAM(s) Nebulizer every 3 hours PRN Shortness of Breath and/or Wheezing  acetaminophen   Tablet. 650milliGRAM(s) Oral every 6 hours PRN Moderate Pain (4 - 6)      RADIOLOGY & ADDITIONAL TEST   EXAM:  US KIDNEY(S)                        PROCEDURE DATE:  03/21/2017      IMPRESSION:       Both kidneys are heterogeneous in echogenicity. The right kidney measures   9.2 cm. There is no right hydronephrosis. The left kidney measures 10.6   cm. There is mild left-sided pelvic fullness. Nocalculi are visualized.

## 2017-03-22 NOTE — PROGRESS NOTE ADULT - SUBJECTIVE AND OBJECTIVE BOX
INTERVAL HISTORY: still c/o anterior chest pain, worse with movement of the chest  	  MEDICATIONS:  carvedilol 6.25milliGRAM(s) Oral every 12 hours  cefTRIAXone   IVPB  IV Intermittent   azithromycin  IVPB  IV Intermittent   azithromycin  IVPB 500milliGRAM(s) IV Intermittent every 24 hours  cefTRIAXone   IVPB 1Gram(s) IV Intermittent every 24 hours  ALBUTerol/ipratropium for Nebulization 3milliLiter(s) Nebulizer every 6 hours  ALBUTerol    0.083% 2.5milliGRAM(s) Nebulizer every 3 hours PRN  HYDROmorphone  Injectable 0.25milliGRAM(s) IV Push every 4 hours PRN  acetaminophen   Tablet. 650milliGRAM(s) Oral every 6 hours PRN  pantoprazole    Tablet 40milliGRAM(s) Oral before breakfast  famotidine    Tablet 20milliGRAM(s) Oral at bedtime  docusate sodium 100milliGRAM(s) Oral three times a day  senna 2Tablet(s) Oral at bedtime  polyethylene glycol 3350 17Gram(s) Oral at bedtime  dextrose Gel 1Dose(s) Oral once PRN  dextrose 50% Injectable 12.5Gram(s) IV Push once  dextrose 50% Injectable 25Gram(s) IV Push once  dextrose 50% Injectable 25Gram(s) IV Push once  glucagon  Injectable 1milliGRAM(s) IntraMuscular once PRN  atorvastatin 20milliGRAM(s) Oral at bedtime  insulin lispro (HumaLOG) corrective regimen sliding scale  SubCutaneous Before meals and at bedtime  methylPREDNISolone sodium succinate Injectable 40milliGRAM(s) IV Push two times a day  insulin glargine Injectable (LANTUS) 10Unit(s) SubCutaneous at bedtime  heparin  Injectable 5000Unit(s) SubCutaneous every 12 hours  aspirin  chewable 81milliGRAM(s) Oral daily  lidocaine   Patch 1Patch Transdermal every 72 hours  lidocaine   Patch 1Patch Transdermal daily  sodium chloride 0.9%. 1000milliLiter(s) IV Continuous <Continuous>        PHYSICAL EXAM:  T(C): 36.7, Max: 36.8 (03-21 @ 12:14)  HR: 53 (53 - 140)  BP: 120/60 (108/50 - 153/80)  RR: 18 (18 - 26)  SpO2: 99% (98% - 100%)  Wt(kg): --  I&O's Summary    I & Os for current day (as of 22 Mar 2017 09:10)  =============================================  IN: 1620 ml / OUT: 700 ml / NET: 920 ml        Appearance: Normal	  Cardiovascular: Normal S1 S2, No JVD, No murmurs, No edema. chest wall tenderness to touch  Respiratory: Lungs clear to auscultation	  Psychiatry: A & O x 3, Mood & affect appropriate  Gastrointestinal:  Soft, Non-tender, + BS	  Skin: No rashes, No ecchymoses, No cyanosis  Neurologic: Non-focal  Extremities: Normal range of motion, No clubbing, cyanosis or edema  Vascular: Peripheral pulses palpable 2+ bilaterally                          10.3   7.9   )-----------( 108      ( 22 Mar 2017 06:36 )             28.7     22 Mar 2017 06:36    127    |  83     |  110.0  ----------------------------<  400    5.2     |  29.0   |  3.29     Ca    8.9        22 Mar 2017 06:36  Phos  5.0       21 Mar 2017 06:59  Mg     2.4       21 Mar 2017 06:59    TPro  6.8    /  Alb  3.2    /  TBili  0.6    /  DBili  x      /  AST  69     /  ALT  66     /  AlkPhos  96     21 Mar 2017 06:59      ASSESSMENT/PLAN: 	68F with hx of DM, HTN, CRF, CHF (diastolic). Found by EMS to be in PEA. ROSC with resuscitation.   PEA again in ER, resuscitation was resumed and again there was ROSC. S/p Levophed, severe respiratory acidosis.   /P Vent support, now extubated. Echo EF of 30-35%   Hx of CAD, prior MI. May come to cardiac cath on this admission when approved by Renal.

## 2017-03-22 NOTE — PROGRESS NOTE ADULT - ASSESSMENT
A  DM  Mild asthma  Some heart disease  Doesn't qualify for or need bipap  P  LABA/ICS  02 as needed  Pain and cardiac Rx  O/P pulmonary FU - non-uregent  Nothing to add  Will sign off

## 2017-03-22 NOTE — PROGRESS NOTE ADULT - ASSESSMENT
This is 76Y W admitted s/p cardiac arrest, likely secondary to hypoglycemia induced hypercapnia (acute on chronic) from stupor causing increased myocardial demand. ROSC with return of mental status, therefore no modified hypothermia, intubated 3/17 extubated 3/18, was on dobutamine infusion for cardiogenic shock. Pt was transfer to , pt is being seen by cardiology, pulmonary and renal. Pt will need cardiac cath once renal function is better.     A/P    >S/P cardiac arrest with successful resuscitation  appreciate cardiology and pulmo input  cont. coreg at lower dose 6.25, titrate as tolerated, cont, aspirin, statin  may need cardiac cath- defer to cardiology, patient had significant acute on CKD   Pulmo started on Rocephin and azithromycin for pneumonia,     repeat CXR showed improved aeration and resolution of pulmonary edema   lidocaine patch for sternal pain - s/p CPR     >Acute on CKD - worsening   appreciate renal input, started on IVF yesterday by renal   renal u/s - unremarkable   f/u bmp    >Respiratory distress - appreciate pulmo input  Pulmo started on Rocephin,  azithromycin, solumedrol  repeat CXR - improving pulmonary edema     >DM- FS elevated- more than 300 partly due to steroid   increase Lantus 15, start humalog 3 units premeal    DVT PPX - heparin This is 76Y W admitted s/p cardiac arrest, likely secondary to hypoglycemia induced hypercapnia (acute on chronic) from stupor causing increased myocardial demand. ROSC with return of mental status, therefore no modified hypothermia, intubated 3/17 extubated 3/18, was on dobutamine infusion for cardiogenic shock. Pt was transfer to , pt is being seen by cardiology, pulmonary and renal. Pt will need cardiac cath once renal function is better.     A/P    >S/P cardiac arrest with successful resuscitation  appreciate cardiology and pulmo input  cont. coreg at lower dose 6.25, titrate as tolerated, cont, aspirin, statin  may need cardiac cath- defer to cardiology, patient had significant acute on CKD   Pulmo started on Rocephin and azithromycin for pneumonia,     repeat CXR showed improved aeration and resolution of pulmonary edema   lidocaine patch for sternal pain - s/p CPR     >Acute on CKD - worsening   appreciate renal input, started on IVF yesterday by renal   renal u/s - unremarkable   f/u bmp    >Respiratory distress - appreciate pulmo input  Pulmo started on Rocephin,  azithromycin, solumedrol  repeat CXR - improving pulmonary edema     >DM- FS elevated- more than 300 partly due to steroid   increase Lantus 15, start humalog 3 units premeal  endo consult requested  A1C- 8.9    DVT PPX - heparin

## 2017-03-22 NOTE — PROGRESS NOTE ADULT - SUBJECTIVE AND OBJECTIVE BOX
PULMONARY PROGRESS NOTE      SOCORRO JARVISALLEN-[id number]    Patient is a 76y old  Female who presents with a chief complaint of Unresponsive/Cardiac Arrest (17 Mar 2017 11:27)  Seen for hypercarbia which was mild  Mild asthma in the past  [facility] suspect in the past, but she has lost significant weight since then    INTERVAL HPI/OVERNIGHT EVENTS:    MEDICATIONS  (STANDING):  heparin  Injectable 5000Unit(s) SubCutaneous every 12 hours  dextrose 50% Injectable 12.5Gram(s) IV Push once  dextrose 50% Injectable 25Gram(s) IV Push once  dextrose 50% Injectable 25Gram(s) IV Push once  aspirin  chewable 81milliGRAM(s) Oral daily  atorvastatin 20milliGRAM(s) Oral at bedtime  lidocaine   Patch 1Patch Transdermal every 72 hours  insulin lispro (HumaLOG) corrective regimen sliding scale  SubCutaneous Before meals and at bedtime  ALBUTerol/ipratropium for Nebulization 3milliLiter(s) Nebulizer every 6 hours  lidocaine   Patch 1Patch Transdermal daily  pantoprazole    Tablet 40milliGRAM(s) Oral before breakfast  famotidine    Tablet 20milliGRAM(s) Oral at bedtime  docusate sodium 100milliGRAM(s) Oral three times a day  senna 2Tablet(s) Oral at bedtime  carvedilol 6.25milliGRAM(s) Oral every 12 hours  cefTRIAXone   IVPB  IV Intermittent   azithromycin  IVPB  IV Intermittent   azithromycin  IVPB 500milliGRAM(s) IV Intermittent every 24 hours  cefTRIAXone   IVPB 1Gram(s) IV Intermittent every 24 hours  methylPREDNISolone sodium succinate Injectable 40milliGRAM(s) IV Push two times a day  insulin glargine Injectable (LANTUS) 10Unit(s) SubCutaneous at bedtime  sodium chloride 0.9%. 1000milliLiter(s) IV Continuous <Continuous>  polyethylene glycol 3350 17Gram(s) Oral at bedtime      MEDICATIONS  (PRN):  dextrose Gel 1Dose(s) Oral once PRN Blood Glucose LESS THAN 70 milliGRAM(s)/deciLiter  glucagon  Injectable 1milliGRAM(s) IntraMuscular once PRN Glucose <70 milliGRAM(s)/deciLiter  HYDROmorphone  Injectable 0.25milliGRAM(s) IV Push every 4 hours PRN Severe Pain (7 - 10)  ALBUTerol    0.083% 2.5milliGRAM(s) Nebulizer every 3 hours PRN Shortness of Breath and/or Wheezing  acetaminophen   Tablet. 650milliGRAM(s) Oral every 6 hours PRN Moderate Pain (4 - 6)      Allergies    IV Contrast (Unknown)    Intolerances        PAST MEDICAL & SURGICAL HISTORY:  Diabetes  Kidney disease  Hypertension  No significant past surgical history      SOCIAL HISTORY  Smoking History:       REVIEW OF SYSTEMS:    CONSTITUTIONAL:  No distress    HEENT:  Eyes:  No diplopia or blurred vision. ENT:  No earache, sore throat or runny nose.    CARDIOVASCULAR:  No pressure, squeezing, tightness, heaviness or aching about the chest; no palpitations.    RESPIRATORY:  No cough, shortness of breath, PND or orthopnea. Mild SOBOE    GASTROINTESTINAL:  No nausea, vomiting or diarrhea.    GENITOURINARY:  No dysuria, frequency or urgency.    NEUROLOGIC:  No paresthesias, fasciculations, seizures or weakness.    PSYCHIATRIC:  No disorder of thought or mood.    Vital Signs Last 24 Hrs  T(C): 36.7, Max: 36.8 (03-21 @ 12:14)  T(F): 98, Max: 98.3 (03-21 @ 15:00)  HR: 53 (53 - 140)  BP: 120/60 (108/50 - 153/80)  BP(mean): --  RR: 18 (18 - 26)  SpO2: 99% (98% - 100%)    PHYSICAL EXAMINATION:    GENERAL: The patient is awake and alert in no apparent distress.     HEENT: Head is normocephalic and atraumatic. Extraocular muscles are intact. Mucous membranes are moist.    NECK: Supple.    LUNGS: Clear to auscultation without wheezing, rales or rhonchi; respirations unlabored    HEART: Regular rate and rhythm without murmur.    ABDOMEN: Soft, nontender, and nondistended.      EXTREMITIES: Without any cyanosis, clubbing, rash, lesions or edema.    NEUROLOGIC: Grossly intact.    LABS:                        10.3   7.9   )-----------( 108      ( 22 Mar 2017 06:36 )             28.7     22 Mar 2017 06:36    127    |  83     |  110.0  ----------------------------<  400    5.2     |  29.0   |  3.29     Ca    8.9        22 Mar 2017 06:36  Phos  5.0       21 Mar 2017 06:59  Mg     2.4       21 Mar 2017 06:59    TPro  6.8    /  Alb  3.2    /  TBili  0.6    /  DBili  x      /  AST  69     /  ALT  66     /  AlkPhos  96     21 Mar 2017 06:59

## 2017-03-22 NOTE — PROGRESS NOTE ADULT - SUBJECTIVE AND OBJECTIVE BOX
NEPHROLOGY INTERVAL HPI/OVERNIGHT EVENTS:    Examined earlier  Looks comfortable    MEDICATIONS  (STANDING):  heparin  Injectable 5000Unit(s) SubCutaneous every 12 hours  dextrose 50% Injectable 12.5Gram(s) IV Push once  dextrose 50% Injectable 25Gram(s) IV Push once  dextrose 50% Injectable 25Gram(s) IV Push once  aspirin  chewable 81milliGRAM(s) Oral daily  atorvastatin 20milliGRAM(s) Oral at bedtime  lidocaine   Patch 1Patch Transdermal every 72 hours  insulin lispro (HumaLOG) corrective regimen sliding scale  SubCutaneous Before meals and at bedtime  ALBUTerol/ipratropium for Nebulization 3milliLiter(s) Nebulizer every 6 hours  lidocaine   Patch 1Patch Transdermal daily  pantoprazole    Tablet 40milliGRAM(s) Oral before breakfast  famotidine    Tablet 20milliGRAM(s) Oral at bedtime  docusate sodium 100milliGRAM(s) Oral three times a day  senna 2Tablet(s) Oral at bedtime  carvedilol 6.25milliGRAM(s) Oral every 12 hours  cefTRIAXone   IVPB  IV Intermittent   azithromycin  IVPB  IV Intermittent   azithromycin  IVPB 500milliGRAM(s) IV Intermittent every 24 hours  cefTRIAXone   IVPB 1Gram(s) IV Intermittent every 24 hours  sodium chloride 0.9%. 1000milliLiter(s) IV Continuous <Continuous>  polyethylene glycol 3350 17Gram(s) Oral at bedtime  insulin glargine Injectable (LANTUS) 15Unit(s) SubCutaneous at bedtime  insulin lispro Injectable (HumaLOG) 3Unit(s) SubCutaneous three times a day before meals  dextrose 5%. 1000milliLiter(s) IV Continuous <Continuous>  methylPREDNISolone sodium succinate Injectable 40milliGRAM(s) IV Push daily  lactulose Syrup 20Gram(s) Oral once    MEDICATIONS  (PRN):  dextrose Gel 1Dose(s) Oral once PRN Blood Glucose LESS THAN 70 milliGRAM(s)/deciLiter  glucagon  Injectable 1milliGRAM(s) IntraMuscular once PRN Glucose <70 milliGRAM(s)/deciLiter  HYDROmorphone  Injectable 0.25milliGRAM(s) IV Push every 4 hours PRN Severe Pain (7 - 10)  ALBUTerol    0.083% 2.5milliGRAM(s) Nebulizer every 3 hours PRN Shortness of Breath and/or Wheezing  acetaminophen   Tablet. 650milliGRAM(s) Oral every 6 hours PRN Moderate Pain (4 - 6)      Allergies    IV Contrast (Unknown)    Intolerances        Vital Signs Last 24 Hrs  T(C): 36.7, Max: 36.8 ( @ 15:00)  T(F): 98.1, Max: 98.3 ( @ 15:00)  HR: 77 (53 - 140)  BP: 110/66 (110/66 - 153/80)  BP(mean): --  RR: 18 (18 - 20)  SpO2: 100% (98% - 100%)  Daily     Daily Weight in k.3 (22 Mar 2017 05:00)    PHYSICAL EXAM:  GENERAL: NAD, generalized weakness  HEAD:  Atraumatic, Normocephalic  EYES: EOMI, PERRLA, conjunctiva and sclera clear  NECK: Supple, No JVD, Normal thyroid  NERVOUS SYSTEM:  Alert & Oriented X3,  intact and symmetric  CHEST/LUNG: Clear to percussion bilaterally  HEART: Regular rate and rhythm; No rub               Pain to palpation over chest wall  ABDOMEN: Soft, Nontender, Nondistended; Bowel sounds present  EXTREMITIES: trace edema        LABS:                        10.3   7.9   )-----------( 108      ( 22 Mar 2017 06:36 )             28.7     22 Mar 2017 06:36    127    |  83     |  110.0  ----------------------------<  400    5.2     |  29.0   |  3.29     Ca    8.9        22 Mar 2017 06:36  Phos  5.0       21 Mar 2017 06:59  Mg     2.4       21 Mar 2017 06:59    TPro  6.8    /  Alb  3.2    /  TBili  0.6    /  DBili  x      /  AST  69     /  ALT  66     /  AlkPhos  96     21 Mar 2017 06:59                RADIOLOGY & ADDITIONAL TESTS:

## 2017-03-22 NOTE — PROGRESS NOTE ADULT - SUBJECTIVE AND OBJECTIVE BOX
INTERVAL HPI/OVERNIGHT EVENTS:  no overnight events.  no BM with Miralax  PRESENT SYMPTOMS: SOURCE:  Patient/Family/Team    PAIN SCALE:  0 = none  1 = mild   2 = moderate  3 = severe    Pain: +discomfort  in abdomen, no significant BM since 3/16            sternal pain is better    Dyspnea:  [ ] YES [ x] NO  Anxiety:  [ ] YES [ x] NO  Fatigue: [ x] YES [ ] NO  Nausea: [ ] YES- slight  [ ] NO  Loss of Appetite: [ ] YES [x ] NO  Other symptoms: __________    MEDICATIONS  (STANDING):  heparin  Injectable 5000Unit(s) SubCutaneous every 12 hours  dextrose 50% Injectable 12.5Gram(s) IV Push once  dextrose 50% Injectable 25Gram(s) IV Push once  dextrose 50% Injectable 25Gram(s) IV Push once  aspirin  chewable 81milliGRAM(s) Oral daily  atorvastatin 20milliGRAM(s) Oral at bedtime  lidocaine   Patch 1Patch Transdermal every 72 hours  insulin lispro (HumaLOG) corrective regimen sliding scale  SubCutaneous Before meals and at bedtime  ALBUTerol/ipratropium for Nebulization 3milliLiter(s) Nebulizer every 6 hours  lidocaine   Patch 1Patch Transdermal daily  pantoprazole    Tablet 40milliGRAM(s) Oral before breakfast  famotidine    Tablet 20milliGRAM(s) Oral at bedtime  docusate sodium 100milliGRAM(s) Oral three times a day  senna 2Tablet(s) Oral at bedtime  carvedilol 6.25milliGRAM(s) Oral every 12 hours  cefTRIAXone   IVPB  IV Intermittent   azithromycin  IVPB  IV Intermittent   azithromycin  IVPB 500milliGRAM(s) IV Intermittent every 24 hours  cefTRIAXone   IVPB 1Gram(s) IV Intermittent every 24 hours  polyethylene glycol 3350 17Gram(s) Oral at bedtime  insulin glargine Injectable (LANTUS) 15Unit(s) SubCutaneous at bedtime  insulin lispro Injectable (HumaLOG) 3Unit(s) SubCutaneous three times a day before meals  dextrose 5%. 1000milliLiter(s) IV Continuous <Continuous>  methylPREDNISolone sodium succinate Injectable 40milliGRAM(s) IV Push daily  lactulose Syrup 20Gram(s) Oral once  sodium chloride 0.9%. 1000milliLiter(s) IV Continuous <Continuous>    MEDICATIONS  (PRN):  dextrose Gel 1Dose(s) Oral once PRN Blood Glucose LESS THAN 70 milliGRAM(s)/deciLiter  glucagon  Injectable 1milliGRAM(s) IntraMuscular once PRN Glucose <70 milliGRAM(s)/deciLiter  HYDROmorphone  Injectable 0.25milliGRAM(s) IV Push every 4 hours PRN Severe Pain (7 - 10)  ALBUTerol    0.083% 2.5milliGRAM(s) Nebulizer every 3 hours PRN Shortness of Breath and/or Wheezing  acetaminophen   Tablet. 650milliGRAM(s) Oral every 6 hours PRN Moderate Pain (4 - 6)      Allergies    IV Contrast (Unknown)    Intolerances      Karnofsky Performance Score/Palliative Performance Status Version 2:  30  %    Vital Signs Last 24 Hrs  T(C): 36.7, Max: 36.8 (03-21 @ 15:00)  T(F): 98.1, Max: 98.3 (03-21 @ 15:00)  HR: 77 (53 - 140)  BP: 110/66 (110/66 - 153/80)  BP(mean): --  RR: 18 (18 - 20)  SpO2: 100% (98% - 100%)    PHYSICAL EXAM:    General: [x ] alert  [ ] oriented x ____ [ ] lethargic [ ] agitated                  [ ] cachexia  [ ] nonverbal  [ ] coma    HEENT: [ x] normal  [ ] dry mouth  [ ] ET tube/trach    Lungs: [x ] comfortable [ ] tachypnea/labored breathing  [ ] excessive secretions    CV: [x ] normal  [ ] tachycardia    GI: +BS soft non tender non distended no rebound or guarding    : x[ ] normal  [ ] incontinent  [ ] oliguria/anuria  [ ] corral    MSK: [ ] normal  [ x] weakness  [ ] edema             [ ] ambulatory  [ ] bedbound/wheelchair bound    Skin: [ ] normal  [ ] pressure ulcers- Stage_____  [x ] no rash    LABS:                        10.3   7.9   )-----------( 108      ( 22 Mar 2017 06:36 )             28.7     22 Mar 2017 06:36    127    |  83     |  110.0  ----------------------------<  400    5.2     |  29.0   |  3.29     Ca    8.9        22 Mar 2017 06:36  Phos  5.0       21 Mar 2017 06:59  Mg     2.4       21 Mar 2017 06:59    TPro  6.8    /  Alb  3.2    /  TBili  0.6    /  DBili  x      /  AST  69     /  ALT  66     /  AlkPhos  96     21 Mar 2017 06:59        I&O's Summary  I & Os for 24h ending 22 Mar 2017 07:00  =============================================  IN: 1620 ml / OUT: 700 ml / NET: 920 ml    I & Os for current day (as of 22 Mar 2017 14:45)  =============================================  IN: 240 ml / OUT: 400 ml / NET: -160 ml      Thank you for the opportunity to assist with the care of this patient.   [facility] Consult Service [phone].

## 2017-03-23 NOTE — PROGRESS NOTE ADULT - SUBJECTIVE AND OBJECTIVE BOX
INTERVAL HPI/OVERNIGHT EVENTS:  feeling better  She states had BM today    PRESENT SYMPTOMS: SOURCE:  Patient/Family/Team    PAIN SCALE:  0 = none  1 = mild   2 = moderate  3 = severe    Pain: 0    Dyspnea:  [ ] YES [x ] NO  Anxiety:  [ ] YES [ x] NO  Fatigue: [x ] YES [ ] NO  Nausea: [ ] YES [ x] NO  Loss of Appetite: [ ] YES [ x] NO  Other symptoms: __________    MEDICATIONS  (STANDING):  heparin  Injectable 5000Unit(s) SubCutaneous every 12 hours  dextrose 50% Injectable 12.5Gram(s) IV Push once  dextrose 50% Injectable 25Gram(s) IV Push once  dextrose 50% Injectable 25Gram(s) IV Push once  aspirin  chewable 81milliGRAM(s) Oral daily  atorvastatin 20milliGRAM(s) Oral at bedtime  lidocaine   Patch 1Patch Transdermal every 72 hours  insulin lispro (HumaLOG) corrective regimen sliding scale  SubCutaneous Before meals and at bedtime  ALBUTerol/ipratropium for Nebulization 3milliLiter(s) Nebulizer every 6 hours  lidocaine   Patch 1Patch Transdermal daily  pantoprazole    Tablet 40milliGRAM(s) Oral before breakfast  famotidine    Tablet 20milliGRAM(s) Oral at bedtime  docusate sodium 100milliGRAM(s) Oral three times a day  senna 2Tablet(s) Oral at bedtime  carvedilol 6.25milliGRAM(s) Oral every 12 hours  cefTRIAXone   IVPB  IV Intermittent   azithromycin  IVPB  IV Intermittent   azithromycin  IVPB 500milliGRAM(s) IV Intermittent every 24 hours  cefTRIAXone   IVPB 1Gram(s) IV Intermittent every 24 hours  insulin glargine Injectable (LANTUS) 15Unit(s) SubCutaneous at bedtime  insulin lispro Injectable (HumaLOG) 3Unit(s) SubCutaneous three times a day before meals  dextrose 5%. 1000milliLiter(s) IV Continuous <Continuous>  methylPREDNISolone sodium succinate Injectable 40milliGRAM(s) IV Push daily  sodium chloride 0.9%. 1000milliLiter(s) IV Continuous <Continuous>  guaiFENesin    Syrup 100milliGRAM(s) Oral every 4 hours    MEDICATIONS  (PRN):  dextrose Gel 1Dose(s) Oral once PRN Blood Glucose LESS THAN 70 milliGRAM(s)/deciLiter  glucagon  Injectable 1milliGRAM(s) IntraMuscular once PRN Glucose <70 milliGRAM(s)/deciLiter  HYDROmorphone  Injectable 0.25milliGRAM(s) IV Push every 4 hours PRN Severe Pain (7 - 10)  ALBUTerol    0.083% 2.5milliGRAM(s) Nebulizer every 3 hours PRN Shortness of Breath and/or Wheezing  acetaminophen   Tablet. 650milliGRAM(s) Oral every 6 hours PRN Moderate Pain (4 - 6)      Allergies    IV Contrast (Unknown)    Intolerances        Karnofsky Performance Score/Palliative Performance Status Version 2:  40       %    Vital Signs Last 24 Hrs  T(C): 36.7, Max: 36.7 (03-22 @ 22:10)  T(F): 98.1, Max: 98.1 (03-22 @ 22:10)  HR: 74 (71 - 77)  BP: 90/50 (90/50 - 106/56)  BP(mean): --  RR: 22 (17 - 22)  SpO2: 100% (92% - 100%)    PHYSICAL EXAM:    General: [x ] alert  [ ] oriented x ____ [ ] lethargic [ ] agitated                  [ ] cachexia  [ ] nonverbal  [ ] coma    HEENT: [x ] normal  [ ] dry mouth  [ ] ET tube/trach    Lungs: [x ] comfortable [ ] tachypnea/labored breathing  [ ] excessive secretions    CV: [x ] normal  [ ] tachycardia    GI: [x ] normal  [ ] distended  [ ] tender  [ ] no BS               [ ] PEG/NG/OG tube    : [x ] normal  [ ] incontinent  [ ] oliguria/anuria  [ ] corral    MSK: [x ] normal  [ ] weakness  [ ] edema             [ ] ambulatory  [ ] bedbound/wheelchair bound    Skin: [x ] normal  [ ] pressure ulcers- Stage_____  [ ] no rash    LABS:                        10.5   10.1  )-----------( 126      ( 23 Mar 2017 07:30 )             31.7     23 Mar 2017 07:31    132    |  89     |  108.0  ----------------------------<  266    4.7     |  32.0   |  2.80     Ca    8.6        23 Mar 2017 07:31          I&O's Summary  I & Os for 24h ending 23 Mar 2017 07:00  =============================================  IN: 2085 ml / OUT: 600 ml / NET: 1485 ml    I & Os for current day (as of 23 Mar 2017 17:25)  =============================================  IN: 855 ml / OUT: 500 ml / NET: 355 ml           Thank you for the opportunity to assist with the care of this patient.   [facility] [phone].

## 2017-03-23 NOTE — PROGRESS NOTE ADULT - SUBJECTIVE AND OBJECTIVE BOX
chief complaint of Unresponsive/Cardiac Arrest    HPI:  68F whose name in [facility], Pmx of DM, HTN, CRF, CHF (diastolic). Found lethargic at home this morning by her son. Her BG was 22, the son attempted to give her juice,, but she collapsed. EMS arrived and she was found in PEA. Resuscitation was undertaken, after several rounds of Epinephrine there was ROSC. Upon arrival to the ER she again developed PEA, resuscitation was resumed and again there was ROSC,  however she was in shock and placed on Levophed. Initial ABG showed a severe respiratory acidosis. She was on full vent support and settings were adjusted. She was spontaneously breathing but had no spontaneous movement and was not responding to her name, so the modified hypothermia protocol was initiated. EKG showed no acute St/T changes. In speaking with the patient's son, she had been complaining of general lethargy and weakness. No recent chest pain, cough fever. (17 Mar 2017 11:27)      INTERVAL HPI/ OVERNIGHT EVENTS: Patient is seen and examined, pt report mild cough and SOB, unable to bring up secretions  Sternal pain - some better      REVIEW OF SYSTEMS:    Denied fever, chills, abd. pain, nausea, vomiting, headache, dizziness    PHYSICAL EXAM:    Vital Signs Last 24 Hrs  T(C): 36.3, Max: 36.7 (03-22 @ 22:10)  T(F): 97.3, Max: 98.1 (03-22 @ 22:10)  HR: 74 (71 - 80)  BP: 90/50 (90/50 - 106/64)  BP(mean): --  RR: 22 (17 - 22)  SpO2: 100% (92% - 100%)      GENERAL: NAD  HEENT: EOMI  Neck: supple  CHEST/LUNG: few wheezing, decrease breath sounds over bases   HEART: S1S2+ audible  ABDOMEN: Soft, Nontender, Nondistended; Bowel sounds present  EXTREMITIES:  chronic skin changes   CNS: AAO X 3  Psychiatry: normal mood    LABS:                                           10.5   10.1  )-----------( 126      ( 23 Mar 2017 07:30 )             31.7   23 Mar 2017 07:31    132    |  89     |  108.0  ----------------------------<  266    4.7     |  32.0   |  2.80     Ca    8.6        23 Mar 2017 07:31          MEDICATIONS  (STANDING):  heparin  Injectable 5000Unit(s) SubCutaneous every 12 hours  dextrose 50% Injectable 12.5Gram(s) IV Push once  dextrose 50% Injectable 25Gram(s) IV Push once  dextrose 50% Injectable 25Gram(s) IV Push once  aspirin  chewable 81milliGRAM(s) Oral daily  atorvastatin 20milliGRAM(s) Oral at bedtime  lidocaine   Patch 1Patch Transdermal every 72 hours  insulin lispro (HumaLOG) corrective regimen sliding scale  SubCutaneous Before meals and at bedtime  ALBUTerol/ipratropium for Nebulization 3milliLiter(s) Nebulizer every 6 hours  lidocaine   Patch 1Patch Transdermal daily  pantoprazole    Tablet 40milliGRAM(s) Oral before breakfast  famotidine    Tablet 20milliGRAM(s) Oral at bedtime  docusate sodium 100milliGRAM(s) Oral three times a day  senna 2Tablet(s) Oral at bedtime  carvedilol 6.25milliGRAM(s) Oral every 12 hours  cefTRIAXone   IVPB  IV Intermittent   azithromycin  IVPB  IV Intermittent   azithromycin  IVPB 500milliGRAM(s) IV Intermittent every 24 hours  cefTRIAXone   IVPB 1Gram(s) IV Intermittent every 24 hours  insulin glargine Injectable (LANTUS) 15Unit(s) SubCutaneous at bedtime  insulin lispro Injectable (HumaLOG) 3Unit(s) SubCutaneous three times a day before meals  dextrose 5%. 1000milliLiter(s) IV Continuous <Continuous>  methylPREDNISolone sodium succinate Injectable 40milliGRAM(s) IV Push daily  sodium chloride 0.9%. 1000milliLiter(s) IV Continuous <Continuous>  guaiFENesin    Syrup 100milliGRAM(s) Oral every 4 hours    MEDICATIONS  (PRN):  dextrose Gel 1Dose(s) Oral once PRN Blood Glucose LESS THAN 70 milliGRAM(s)/deciLiter  glucagon  Injectable 1milliGRAM(s) IntraMuscular once PRN Glucose <70 milliGRAM(s)/deciLiter  HYDROmorphone  Injectable 0.25milliGRAM(s) IV Push every 4 hours PRN Severe Pain (7 - 10)  ALBUTerol    0.083% 2.5milliGRAM(s) Nebulizer every 3 hours PRN Shortness of Breath and/or Wheezing  acetaminophen   Tablet. 650milliGRAM(s) Oral every 6 hours PRN Moderate Pain (4 - 6)      RADIOLOGY & ADDITIONAL TEST   EXAM:  US KIDNEY(S)                        PROCEDURE DATE:  03/21/2017      IMPRESSION:       Both kidneys are heterogeneous in echogenicity. The right kidney measures   9.2 cm. There is no right hydronephrosis. The left kidney measures 10.6   cm. There is mild left-sided pelvic fullness. Nocalculi are visualized.

## 2017-03-23 NOTE — PROGRESS NOTE ADULT - ASSESSMENT
This is 76Y W admitted s/p cardiac arrest, likely secondary to hypoglycemia induced hypercapnia (acute on chronic) from stupor causing increased myocardial demand. ROSC with return of mental status, therefore no modified hypothermia, intubated 3/17 extubated 3/18, was on dobutamine infusion for cardiogenic shock. Pt was transfer to , pt is being seen by cardiology, pulmonary and renal. Pt will need cardiac cath once renal function is better.     A/P    >S/P cardiac arrest with successful resuscitation  appreciate cardiology and pulmo input  cont. coreg at lower dose 6.25, titrate as tolerated, cont, aspirin, statin  pending cardiac cath- defer to cardiology, patient had significant acute on CKD   Pulmo started on Rocephin and azithromycin for pneumonia,     repeat CXR showed improved aeration and resolution of pulmonary edema   lidocaine patch for sternal pain - s/p CPR     >Acute on CKD - worsening   appreciate renal input, started on IVF yesterday by renal   renal u/s - unremarkable   f/u bmp    >Respiratory distress - appreciate pulmo input  Pulmo started on Rocephin,  azithromycin, solumedrol  repeat CXR - improving pulmonary edema     >DM- FS elevated- more than 300 partly due to steroid   ct Lantus 15,  humalog 3 units premeal  endo consult requested  A1C- 8.9    DVT PPX - heparin

## 2017-03-23 NOTE — PROGRESS NOTE ADULT - SUBJECTIVE AND OBJECTIVE BOX
NEPHROLOGY INTERVAL HPI/OVERNIGHT EVENTS:    Examined earlier  Looks comfortable    MEDICATIONS  (STANDING):  heparin  Injectable 5000Unit(s) SubCutaneous every 12 hours  dextrose 50% Injectable 12.5Gram(s) IV Push once  dextrose 50% Injectable 25Gram(s) IV Push once  dextrose 50% Injectable 25Gram(s) IV Push once  aspirin  chewable 81milliGRAM(s) Oral daily  atorvastatin 20milliGRAM(s) Oral at bedtime  lidocaine   Patch 1Patch Transdermal every 72 hours  insulin lispro (HumaLOG) corrective regimen sliding scale  SubCutaneous Before meals and at bedtime  ALBUTerol/ipratropium for Nebulization 3milliLiter(s) Nebulizer every 6 hours  lidocaine   Patch 1Patch Transdermal daily  pantoprazole    Tablet 40milliGRAM(s) Oral before breakfast  famotidine    Tablet 20milliGRAM(s) Oral at bedtime  docusate sodium 100milliGRAM(s) Oral three times a day  senna 2Tablet(s) Oral at bedtime  carvedilol 6.25milliGRAM(s) Oral every 12 hours  cefTRIAXone   IVPB  IV Intermittent   azithromycin  IVPB  IV Intermittent   azithromycin  IVPB 500milliGRAM(s) IV Intermittent every 24 hours  cefTRIAXone   IVPB 1Gram(s) IV Intermittent every 24 hours  insulin glargine Injectable (LANTUS) 15Unit(s) SubCutaneous at bedtime  insulin lispro Injectable (HumaLOG) 3Unit(s) SubCutaneous three times a day before meals  dextrose 5%. 1000milliLiter(s) IV Continuous <Continuous>  methylPREDNISolone sodium succinate Injectable 40milliGRAM(s) IV Push daily  sodium chloride 0.9%. 1000milliLiter(s) IV Continuous <Continuous>  guaiFENesin    Syrup 100milliGRAM(s) Oral every 4 hours    MEDICATIONS  (PRN):  dextrose Gel 1Dose(s) Oral once PRN Blood Glucose LESS THAN 70 milliGRAM(s)/deciLiter  glucagon  Injectable 1milliGRAM(s) IntraMuscular once PRN Glucose <70 milliGRAM(s)/deciLiter  HYDROmorphone  Injectable 0.25milliGRAM(s) IV Push every 4 hours PRN Severe Pain (7 - 10)  ALBUTerol    0.083% 2.5milliGRAM(s) Nebulizer every 3 hours PRN Shortness of Breath and/or Wheezing  acetaminophen   Tablet. 650milliGRAM(s) Oral every 6 hours PRN Moderate Pain (4 - 6)      Allergies    IV Contrast (Unknown)    Intolerances        Vital Signs Last 24 Hrs  T(C): 36.3, Max: 36.7 ( @ 22:10)  T(F): 97.3, Max: 98.1 ( @ 22:10)  HR: 74 (71 - 80)  BP: 90/50 (90/50 - 106/64)  BP(mean): --  RR: 22 (17 - 22)  SpO2: 100% (92% - 100%)  Daily     Daily Weight in k.1 (23 Mar 2017 04:47)    PHYSICAL EXAM:  GENERAL: NAD, generalized weakness  HEAD:  Atraumatic, Normocephalic  EYES: EOMI  NECK: Supple, No JVD  NERVOUS SYSTEM:  Alert & Oriented X3  CHEST/LUNG: Clear to percussion bilaterally  HEART: Regular rate and rhythm; No rub               Pain to palpation over chest wall  ABDOMEN: Soft, NDNT + BS  EXTREMITIES: trace edema          LABS:                        10.5   10.1  )-----------( 126      ( 23 Mar 2017 07:30 )             31.7     23 Mar 2017 07:31    132    |  89     |  108.0  ----------------------------<  266    4.7     |  32.0   |  2.80     Ca    8.6        23 Mar 2017 07:31                  RADIOLOGY & ADDITIONAL TESTS:

## 2017-03-23 NOTE — PROGRESS NOTE ADULT - PROBLEM SELECTOR PLAN 1
For eventual cath once kidney function improved  Lidoderm patch for sternal pain likely related to CPR - improving

## 2017-03-23 NOTE — PROGRESS NOTE ADULT - ASSESSMENT
CKD IV: s/p cardiac and respiratory arrest  SENTHIL suspect ATN from prolonged ischemia Cr slightly improved today  Systoli HF EF 35% (3/17)   Elevated BUN in part due to steroids  - avoid potential nephrotoxic agents  -  pt requires cardiac cath; on hold until renal function optimized Cr worse today CKD IV: s/p cardiac and respiratory arrest  SENTHIL suspect ATN from prolonged ischemia Cr slightly improved today  Systoli HF EF 35% (3/17)   Elevated BUN in part due to steroids  - avoid potential nephrotoxic agents  -  pt requires cardiac cath; on hold until renal function optimized

## 2017-03-23 NOTE — CONSULT NOTE ADULT - ASSESSMENT
T2DM- now insulin requiring uncontrolled with hypoglcyemia   ? - Pt with episode of hypoglycemia - Notsure if related to over dose of insulin or was secondary to cardiac event   Will maintain Lantus 15 untis and sliding scale Novolog with additonal premeal coverage   Goal sugar is 150-200 unitsCRI   with CRI- high risk for severe hypoglycemia   Hypoglcymeia- resolved but will set goal BS at 150-200 to help avoid severe epsiodes  so will maintain close watch as steroids are tapered and adjust insulin downward

## 2017-03-24 NOTE — PROGRESS NOTE ADULT - ASSESSMENT
This is 76Y W admitted s/p cardiac arrest, likely secondary to hypoglycemia induced hypercapnia (acute on chronic) from stupor causing increased myocardial demand. ROSC with return of mental status, therefore no modified hypothermia, intubated 3/17 extubated 3/18, was on dobutamine infusion for cardiogenic shock. Pt was transfer to , pt is being seen by cardiology, pulmonary and renal. Pt will need cardiac cath once renal function is better.     A/P    >S/P cardiac arrest with successful resuscitation  appreciate cardiology and pulmo input  cont. coreg at lower dose 6.25, titrate as tolerated, cont, aspirin, statin  pending cardiac cath- defer to cardiology, patient had significant acute on CKD   Pulmo started on Rocephin and azithromycin for pneumonia,     repeat CXR showed improved aeration and resolution of pulmonary edema   lidocaine patch for sternal pain - s/p CPR     >Acute on CKD - improving  appreciate renal input,   renal u/s - unremarkable       >Respiratory distress - appreciate pulmo input  Pulmo started on Rocephin,  azithromycin, solumedrol  repeat CXR - improving pulmonary edema     >DM- FS elevated- more than 300 partly due to steroid   ct Lantus 15,  humalog 3 units premeal  A1C- 8.9    DVT PPX - heparin

## 2017-03-24 NOTE — PROGRESS NOTE ADULT - SUBJECTIVE AND OBJECTIVE BOX
NEPHROLOGY INTERVAL HPI/OVERNIGHT EVENTS:    MEDICATIONS  (STANDING):  heparin  Injectable 5000Unit(s) SubCutaneous every 12 hours  dextrose 50% Injectable 12.5Gram(s) IV Push once  dextrose 50% Injectable 25Gram(s) IV Push once  dextrose 50% Injectable 25Gram(s) IV Push once  aspirin  chewable 81milliGRAM(s) Oral daily  atorvastatin 20milliGRAM(s) Oral at bedtime  insulin lispro (HumaLOG) corrective regimen sliding scale  SubCutaneous Before meals and at bedtime  ALBUTerol/ipratropium for Nebulization 3milliLiter(s) Nebulizer every 6 hours  lidocaine   Patch 1Patch Transdermal daily  pantoprazole    Tablet 40milliGRAM(s) Oral before breakfast  famotidine    Tablet 20milliGRAM(s) Oral at bedtime  docusate sodium 100milliGRAM(s) Oral three times a day  senna 2Tablet(s) Oral at bedtime  carvedilol 6.25milliGRAM(s) Oral every 12 hours  insulin glargine Injectable (LANTUS) 15Unit(s) SubCutaneous at bedtime  insulin lispro Injectable (HumaLOG) 3Unit(s) SubCutaneous three times a day before meals  dextrose 5%. 1000milliLiter(s) IV Continuous <Continuous>  methylPREDNISolone sodium succinate Injectable 40milliGRAM(s) IV Push daily  guaiFENesin    Syrup 100milliGRAM(s) Oral every 4 hours  levoFLOXacin  Tablet 500milliGRAM(s) Oral every 24 hours  furosemide   Injectable 80milliGRAM(s) IV Push two times a day  acetylcysteine  Oral Solution 1200milliGRAM(s) Oral two times a day    MEDICATIONS  (PRN):  dextrose Gel 1Dose(s) Oral once PRN Blood Glucose LESS THAN 70 milliGRAM(s)/deciLiter  glucagon  Injectable 1milliGRAM(s) IntraMuscular once PRN Glucose <70 milliGRAM(s)/deciLiter  HYDROmorphone  Injectable 0.25milliGRAM(s) IV Push every 4 hours PRN Severe Pain (7 - 10)  ALBUTerol    0.083% 2.5milliGRAM(s) Nebulizer every 3 hours PRN Shortness of Breath and/or Wheezing  acetaminophen   Tablet. 650milliGRAM(s) Oral every 6 hours PRN Moderate Pain (4 - 6)  aluminum hydroxide/magnesium hydroxide/simethicone Suspension 30milliLiter(s) Oral every 6 hours PRN Dyspepsia      Allergies    IV Contrast (Unknown)    Intolerances        Vital Signs Last 24 Hrs  T(C): 36.7, Max: 36.8 ( @ 17:35)  T(F): 98, Max: 98.3 ( @ 17:35)  HR: 75 (75 - 90)  BP: 134/64 (94/50 - 134/64)  BP(mean): --  RR: 18 (17 - 19)  SpO2: 100% (95% - 100%)  Daily     Daily Weight in k.2 (24 Mar 2017 07:00)    PHYSICAL EXAM:    GENERAL: appears acute and chronically ill  HEAD:    EYES:   ENMT:   NECK: veins distended in upright position  NERVOUS SYSTEM:  awake, alert, verbal  CHEST/LUNG: Base crackles, nasal 02  HEART: no rub  ABDOMEN: soft not tender  EXTREMITIES:  pitting leg edema  LYMPH:   SKIN: no rash    LABS:                        13.6   8.9   )-----------( 138      ( 24 Mar 2017 08:11 )             42.1     24 Mar 2017 08:11    132    |  89     |  99.0   ----------------------------<  124    5.4     |  28.0   |  2.40     Ca    9.1        24 Mar 2017 08:11                  RADIOLOGY & ADDITIONAL TESTS:

## 2017-03-24 NOTE — PROGRESS NOTE ADULT - SUBJECTIVE AND OBJECTIVE BOX
[facility]                                                    [address], [address], [place] [address]                                                         PHONE: [phone]    FAX: [phone] [address], [address], [place] [address]                                                 PHONE: [phone]    FAX: [phone]  *******************************************************************************    Overnight events/Subjective Assessment:    INTERPRETATION OF TELEMETRY (personally reviewed):    IV Contrast (Unknown)    MEDICATIONS  (STANDING):  heparin  Injectable 5000Unit(s) SubCutaneous every 12 hours  dextrose 50% Injectable 12.5Gram(s) IV Push once  dextrose 50% Injectable 25Gram(s) IV Push once  dextrose 50% Injectable 25Gram(s) IV Push once  aspirin  chewable 81milliGRAM(s) Oral daily  atorvastatin 20milliGRAM(s) Oral at bedtime  lidocaine   Patch 1Patch Transdermal every 72 hours  insulin lispro (HumaLOG) corrective regimen sliding scale  SubCutaneous Before meals and at bedtime  ALBUTerol/ipratropium for Nebulization 3milliLiter(s) Nebulizer every 6 hours  lidocaine   Patch 1Patch Transdermal daily  pantoprazole    Tablet 40milliGRAM(s) Oral before breakfast  famotidine    Tablet 20milliGRAM(s) Oral at bedtime  docusate sodium 100milliGRAM(s) Oral three times a day  senna 2Tablet(s) Oral at bedtime  carvedilol 6.25milliGRAM(s) Oral every 12 hours  cefTRIAXone   IVPB  IV Intermittent   azithromycin  IVPB  IV Intermittent   azithromycin  IVPB 500milliGRAM(s) IV Intermittent every 24 hours  cefTRIAXone   IVPB 1Gram(s) IV Intermittent every 24 hours  insulin glargine Injectable (LANTUS) 15Unit(s) SubCutaneous at bedtime  insulin lispro Injectable (HumaLOG) 3Unit(s) SubCutaneous three times a day before meals  dextrose 5%. 1000milliLiter(s) IV Continuous <Continuous>  methylPREDNISolone sodium succinate Injectable 40milliGRAM(s) IV Push daily  sodium chloride 0.9%. 1000milliLiter(s) IV Continuous <Continuous>  guaiFENesin    Syrup 100milliGRAM(s) Oral every 4 hours    MEDICATIONS  (PRN):  dextrose Gel 1Dose(s) Oral once PRN Blood Glucose LESS THAN 70 milliGRAM(s)/deciLiter  glucagon  Injectable 1milliGRAM(s) IntraMuscular once PRN Glucose <70 milliGRAM(s)/deciLiter  HYDROmorphone  Injectable 0.25milliGRAM(s) IV Push every 4 hours PRN Severe Pain (7 - 10)  ALBUTerol    0.083% 2.5milliGRAM(s) Nebulizer every 3 hours PRN Shortness of Breath and/or Wheezing  acetaminophen   Tablet. 650milliGRAM(s) Oral every 6 hours PRN Moderate Pain (4 - 6)  aluminum hydroxide/magnesium hydroxide/simethicone Suspension 30milliLiter(s) Oral every 6 hours PRN Dyspepsia      Vital Signs Last 24 Hrs  T(C): 36.6, Max: 36.8 (03-23 @ 17:35)  T(F): 97.8, Max: 98.3 (03-23 @ 17:35)  HR: 90 (74 - 90)  BP: 106/62 (90/50 - 118/62)  BP(mean): --  RR: 17 (17 - 22)  SpO2: 95% (92% - 100%)    I&O's Detail    I & Os for current day (as of 24 Mar 2017 08:03)  =============================================  IN:    Oral Fluid: 960 ml    sodium chloride 0.9%.: 375 ml    Total IN: 1335 ml  ---------------------------------------------  OUT:    Voided: 1150 ml    Total OUT: 1150 ml  ---------------------------------------------  Total NET: 185 ml    I&O's Summary    I & Os for current day (as of 24 Mar 2017 08:03)  =============================================  IN: 1335 ml / OUT: 1150 ml / NET: 185 ml          PHYSICAL EXAM:  General: Appears well developed, well nourished, no acute distress  HEENT: Head: normocephalic, atraumatic  Eyes: Pupils equal and reactive  Neck: Supple, no carotid bruit, no JVD, no HJR  CARDIOVASCULAR: Normal S1 and S2, II/VI murmur,No rub, or gallop  LUNGS: Clear to auscultation bilaterally, no rales, rhonchi or wheeze  ABDOMEN: Soft, nontender, non-distended, positive bowel sounds, no mass or bruit  EXTREMITIES: No edema, distal pulses WNL  SKIN: Warm and dry with normal turgor  NEURO: Alert & oriented x 3, grossly intact  PSYCH: normal mood and affect        LABS:                        10.5   10.1  )-----------( 126      ( 23 Mar 2017 07:30 )             31.7     23 Mar 2017 07:31    132    |  89     |  108.0  ----------------------------<  266    4.7     |  32.0   |  2.80     Ca    8.6        23 Mar 2017 07:31            serum  Lipids:   Hemoglobin A1C, Whole Blood: 8.9 % (03-18 @ 06:25)        RADIOLOGY & ADDITIONAL STUDIES:    ECG:    ECHO:  Summary:   1. Left ventricular ejection fraction, by visual estimation, is 30 to   35%.   2. Technically adequate study.   3. Moderately to severely decreased global left ventricular systolic   function.   4. Basal and mid anterior septum, basal and mid inferior septum, mid   anterolateral segment, and basal inferior segment are abnormal as   described above.   5. Normal left ventricular internal cavity size.   6. Trivial pericardial effusion.   7. Moderate mitral annular calcification.   8. Thickening of the anterior and posterior mitral valve leaflets.   9. Sclerotic aortic valve with normal opening.  10. Estimated pulmonary artery systolic pressure is 44.3 mmHg assuming a   right atrial pressure of 10 mmHg, which is consistent with mild pulmonary   hypertension.  11. Intra-atrial septal aneurysm.    STRESS TEST:    CARDIAC CATHETERIZATION:    ASSESSMENT AND PLAN:  In summary, SOCORRO JARVIS is a 76y Female with past medical history significant for  DM, HTN, CRF, CHF (diastolic). Found by EMS to be in PEA. ROSC with resuscitation and CPR.   PEA again in ER, resuscitation was resumed and again there was ROSC. S/p Levophed, severe respiratory acidosis. Extubated. Echo EF of 30-35% (worse than prior EF) Hx of CAD, prior MI.   - Eventual cardiac catheterization pending improvement of renal function. Peak troponin mildly increased at 0.4  - Cr decreased frp, 3.29 tp 2.8. Severe azotemia. NGH=936  - Head CT no acute pathology. Pt answering appropriately  - Sinus rhythm.  - ASA, statin  - Add coreg  - defer ACEI due to RI          Monet Ovalles MD

## 2017-03-24 NOTE — PROGRESS NOTE ADULT - SUBJECTIVE AND OBJECTIVE BOX
chief complaint of Unresponsive/Cardiac Arrest    HPI:  68F whose name in Christina Cardenas, Pmx of DM, HTN, CRF, CHF (diastolic). Found lethargic at home this morning by her son. Her BG was 22, the son attempted to give her juice,, but she collapsed. EMS arrived and she was found in PEA. Resuscitation was undertaken, after several rounds of Epinephrine there was ROSC. Upon arrival to the ER she again developed PEA, resuscitation was resumed and again there was ROSC,  however she was in shock and placed on Levophed. Initial ABG showed a severe respiratory acidosis. She was on full vent support and settings were adjusted. She was spontaneously breathing but had no spontaneous movement and was not responding to her name, so the modified hypothermia protocol was initiated. EKG showed no acute St/T changes. In speaking with the patient's son, she had been complaining of general lethargy and weakness. No recent chest pain, cough fever. (17 Mar 2017 11:27)      INTERVAL HPI/ OVERNIGHT EVENTS: Patient is seen and examined,   Sternal pain - some better  cough some improvement      REVIEW OF SYSTEMS:    Denied fever, chills, abd. pain, nausea, vomiting, headache, dizziness    PHYSICAL EXAM:    Vital Signs Last 24 Hrs  T(C): 36.7, Max: 36.8 (03-23 @ 17:35)  T(F): 98, Max: 98.3 (03-23 @ 17:35)  HR: 75 (75 - 90)  BP: 134/64 (94/50 - 134/64)  BP(mean): --  RR: 18 (17 - 19)  SpO2: 100% (95% - 100%)      GENERAL: NAD  HEENT: EOMI  Neck: supple  CHEST/LUNG: few wheezing, decrease breath sounds over bases   HEART: S1S2+ audible  ABDOMEN: Soft, Nontender, Nondistended; Bowel sounds present  EXTREMITIES:  chronic skin changes   CNS: AAO X 3  Psychiatry: normal mood    LABS:                                                      13.6   8.9   )-----------( 138      ( 24 Mar 2017 08:11 )             42.1   24 Mar 2017 08:11    132    |  89     |  99.0   ----------------------------<  124    5.4     |  28.0   |  2.40     Ca    9.1        24 Mar 2017 08:11              MEDICATIONS  (STANDING):  heparin  Injectable 5000Unit(s) SubCutaneous every 12 hours  dextrose 50% Injectable 12.5Gram(s) IV Push once  dextrose 50% Injectable 25Gram(s) IV Push once  dextrose 50% Injectable 25Gram(s) IV Push once  aspirin  chewable 81milliGRAM(s) Oral daily  atorvastatin 20milliGRAM(s) Oral at bedtime  insulin lispro (HumaLOG) corrective regimen sliding scale  SubCutaneous Before meals and at bedtime  ALBUTerol/ipratropium for Nebulization 3milliLiter(s) Nebulizer every 6 hours  lidocaine   Patch 1Patch Transdermal daily  pantoprazole    Tablet 40milliGRAM(s) Oral before breakfast  famotidine    Tablet 20milliGRAM(s) Oral at bedtime  docusate sodium 100milliGRAM(s) Oral three times a day  senna 2Tablet(s) Oral at bedtime  carvedilol 6.25milliGRAM(s) Oral every 12 hours  insulin glargine Injectable (LANTUS) 15Unit(s) SubCutaneous at bedtime  insulin lispro Injectable (HumaLOG) 3Unit(s) SubCutaneous three times a day before meals  dextrose 5%. 1000milliLiter(s) IV Continuous <Continuous>  methylPREDNISolone sodium succinate Injectable 40milliGRAM(s) IV Push daily  guaiFENesin    Syrup 100milliGRAM(s) Oral every 4 hours  levoFLOXacin  Tablet 500milliGRAM(s) Oral every 24 hours  furosemide   Injectable 80milliGRAM(s) IV Push two times a day  acetylcysteine  Oral Solution 1200milliGRAM(s) Oral two times a day    MEDICATIONS  (PRN):  dextrose Gel 1Dose(s) Oral once PRN Blood Glucose LESS THAN 70 milliGRAM(s)/deciLiter  glucagon  Injectable 1milliGRAM(s) IntraMuscular once PRN Glucose <70 milliGRAM(s)/deciLiter  HYDROmorphone  Injectable 0.25milliGRAM(s) IV Push every 4 hours PRN Severe Pain (7 - 10)  ALBUTerol    0.083% 2.5milliGRAM(s) Nebulizer every 3 hours PRN Shortness of Breath and/or Wheezing  acetaminophen   Tablet. 650milliGRAM(s) Oral every 6 hours PRN Moderate Pain (4 - 6)  aluminum hydroxide/magnesium hydroxide/simethicone Suspension 30milliLiter(s) Oral every 6 hours PRN Dyspepsia        RADIOLOGY & ADDITIONAL TEST   EXAM:  US KIDNEY(S)                        PROCEDURE DATE:  03/21/2017      IMPRESSION:       Both kidneys are heterogeneous in echogenicity. The right kidney measures   9.2 cm. There is no right hydronephrosis. The left kidney measures 10.6   cm. There is mild left-sided pelvic fullness. Nocalculi are visualized.

## 2017-03-25 NOTE — PROGRESS NOTE ADULT - PROBLEM SELECTOR PLAN 1
S/P cardiac arrest with successful resuscitation  -Cardiology follow up noted and appreciated   -c/w coreg 6.25 po q12hrs   -c/w aspirin/statin  -pending cardiac cath, SENTHIL slowly improving, nephrology follow up noted and appreciated and Pt is at high risk for renal failure with IV dye and may therefore need dialysis and is suggesting medical management. Will d/w cardiology.   -c/w lidocaine patch for sternal pain

## 2017-03-25 NOTE — PROGRESS NOTE ADULT - SUBJECTIVE AND OBJECTIVE BOX
Patient is a 76y old  Female who presents with a chief complaint of Unresponsive/Cardiac Arrest (17 Mar 2017 11:27)      HEALTH ISSUES - PROBLEM Dx:  Type 2 diabetes mellitus with nephropathy: Type 2 diabetes mellitus with nephropathy  Constipation, unspecified constipation type: Constipation, unspecified constipation type  Encounter for palliative care: Encounter for palliative care  Pain: Pain  Chronic congestive heart failure, unspecified congestive heart failure type: Chronic congestive heart failure, unspecified congestive heart failure type  Cough: Cough  Abnormal chest x-ray: Abnormal chest x-ray  Prophylactic measure: Prophylactic measure  Hypercarbia: Hypercarbia  Hypoxia: Hypoxia  Congestive heart failure: Congestive heart failure  Diabetes mellitus of other type with oral complication, without long-term current use of insulin: Diabetes mellitus of other type with oral complication, without long-term current use of insulin  Kidney disease: Kidney disease  Cardiopulmonary arrest with successful resuscitation: Cardiopulmonary arrest with successful resuscitation  Hypoglycemia: Hypoglycemia  SENTHIL (acute kidney injury): SENTHIL (acute kidney injury)  Respiratory distress: Respiratory distress  Cardiac arrest: Cardiac arrest      INTERVAL HPI/OVERNIGHT EVENTS:  Patient seen and examined at bedside. No acute events overnight. This morning patients fingerstick was 69, improved after patient had breakfast. Patient states she is feeling well, still has some left sided chest pain 5/10 which improves with pain medication regimen. Patient denies SOB, abd pain, N/V, fever, chills, dysuria or any other complaints. All remainder ROS negative.     Vital Signs Last 24 Hrs  T(C): 37.1, Max: 37.1 (03-25 @ 18:00)  T(F): 98.7, Max: 98.7 (03-25 @ 18:00)  HR: 87 (78 - 135)  BP: 135/72 (112/58 - 136/70)  BP(mean): --  RR: 17 (17 - 19)  SpO2: 99% (99% - 100%)    CAPILLARY BLOOD GLUCOSE  241 (25 Mar 2017 18:00)  147 (25 Mar 2017 11:00)  69 (25 Mar 2017 09:00)  362 (24 Mar 2017 21:57)      I&O's Summary  I & Os for 24h ending 25 Mar 2017 07:00  =============================================  IN: 720 ml / OUT: 1850 ml / NET: -1130 ml    I & Os for current day (as of 25 Mar 2017 18:16)  =============================================  IN: 750 ml / OUT: 900 ml / NET: -150 ml      CONSTITUTIONAL: Well appearing, well nourished, awake, alert and in no apparent distress  CARDIAC: Normal rate, regular rhythm.  Heart sounds S1, S2.  No murmurs, rubs or gallops   RESPIRATORY: Decreased BS bilaterally. Minimal exp wheezing   ABDOMEN: Soft, NT ND BS+  EXTREMITIES: No edema, cyanosis or deformity   SKIN: No rash, chronic skin changes     MEDICATIONS  (STANDING):  heparin  Injectable 5000Unit(s) SubCutaneous every 12 hours  dextrose 50% Injectable 12.5Gram(s) IV Push once  dextrose 50% Injectable 25Gram(s) IV Push once  dextrose 50% Injectable 25Gram(s) IV Push once  aspirin  chewable 81milliGRAM(s) Oral daily  atorvastatin 20milliGRAM(s) Oral at bedtime  insulin lispro (HumaLOG) corrective regimen sliding scale  SubCutaneous Before meals and at bedtime  ALBUTerol/ipratropium for Nebulization 3milliLiter(s) Nebulizer every 6 hours  lidocaine   Patch 1Patch Transdermal daily  pantoprazole    Tablet 40milliGRAM(s) Oral before breakfast  famotidine    Tablet 20milliGRAM(s) Oral at bedtime  docusate sodium 100milliGRAM(s) Oral three times a day  senna 2Tablet(s) Oral at bedtime  carvedilol 6.25milliGRAM(s) Oral every 12 hours  insulin lispro Injectable (HumaLOG) 3Unit(s) SubCutaneous three times a day before meals  dextrose 5%. 1000milliLiter(s) IV Continuous <Continuous>  guaiFENesin    Syrup 100milliGRAM(s) Oral every 4 hours  furosemide   Injectable 80milliGRAM(s) IV Push two times a day  acetylcysteine  Oral Solution 1200milliGRAM(s) Oral two times a day  insulin glargine Injectable (LANTUS) 8Unit(s) SubCutaneous at bedtime    MEDICATIONS  (PRN):  dextrose Gel 1Dose(s) Oral once PRN Blood Glucose LESS THAN 70 milliGRAM(s)/deciLiter  glucagon  Injectable 1milliGRAM(s) IntraMuscular once PRN Glucose <70 milliGRAM(s)/deciLiter  HYDROmorphone  Injectable 0.25milliGRAM(s) IV Push every 4 hours PRN Severe Pain (7 - 10)  ALBUTerol    0.083% 2.5milliGRAM(s) Nebulizer every 3 hours PRN Shortness of Breath and/or Wheezing  acetaminophen   Tablet. 650milliGRAM(s) Oral every 6 hours PRN Moderate Pain (4 - 6)  aluminum hydroxide/magnesium hydroxide/simethicone Suspension 30milliLiter(s) Oral every 6 hours PRN Dyspepsia      LABS:                        13.6   8.9   )-----------( 138      ( 24 Mar 2017 08:11 )             42.1     25 Mar 2017 07:45    134    |  90     |  96.0   ----------------------------<  67     5.2     |  27.0   |  2.26     Ca    8.8        25 Mar 2017 07:45    TPro  6.4    /  Alb  2.6    /  TBili  0.5    /  DBili  x      /  AST  60     /  ALT  72     /  AlkPhos  88     25 Mar 2017 07:45        LIVER FUNCTIONS - ( 25 Mar 2017 07:45 )  Alb: 2.6 g/dL / Pro: 6.4 g/dL / ALK PHOS: 88 U/L / ALT: 72 U/L / AST: 60 U/L / GGT: x

## 2017-03-25 NOTE — PROGRESS NOTE ADULT - SUBJECTIVE AND OBJECTIVE BOX
NEPHROLOGY INTERVAL HPI/OVERNIGHT EVENTS: No new events .    MEDICATIONS  (STANDING):  heparin  Injectable 5000Unit(s) SubCutaneous every 12 hours  dextrose 50% Injectable 12.5Gram(s) IV Push once  dextrose 50% Injectable 25Gram(s) IV Push once  dextrose 50% Injectable 25Gram(s) IV Push once  aspirin  chewable 81milliGRAM(s) Oral daily  atorvastatin 20milliGRAM(s) Oral at bedtime  insulin lispro (HumaLOG) corrective regimen sliding scale  SubCutaneous Before meals and at bedtime  ALBUTerol/ipratropium for Nebulization 3milliLiter(s) Nebulizer every 6 hours  lidocaine   Patch 1Patch Transdermal daily  pantoprazole    Tablet 40milliGRAM(s) Oral before breakfast  famotidine    Tablet 20milliGRAM(s) Oral at bedtime  docusate sodium 100milliGRAM(s) Oral three times a day  senna 2Tablet(s) Oral at bedtime  carvedilol 6.25milliGRAM(s) Oral every 12 hours  insulin lispro Injectable (HumaLOG) 3Unit(s) SubCutaneous three times a day before meals  dextrose 5%. 1000milliLiter(s) IV Continuous <Continuous>  guaiFENesin    Syrup 100milliGRAM(s) Oral every 4 hours  levoFLOXacin  Tablet 500milliGRAM(s) Oral every 24 hours  furosemide   Injectable 80milliGRAM(s) IV Push two times a day  acetylcysteine  Oral Solution 1200milliGRAM(s) Oral two times a day  insulin glargine Injectable (LANTUS) 8Unit(s) SubCutaneous at bedtime    MEDICATIONS  (PRN):  dextrose Gel 1Dose(s) Oral once PRN Blood Glucose LESS THAN 70 milliGRAM(s)/deciLiter  glucagon  Injectable 1milliGRAM(s) IntraMuscular once PRN Glucose <70 milliGRAM(s)/deciLiter  HYDROmorphone  Injectable 0.25milliGRAM(s) IV Push every 4 hours PRN Severe Pain (7 - 10)  ALBUTerol    0.083% 2.5milliGRAM(s) Nebulizer every 3 hours PRN Shortness of Breath and/or Wheezing  acetaminophen   Tablet. 650milliGRAM(s) Oral every 6 hours PRN Moderate Pain (4 - 6)  aluminum hydroxide/magnesium hydroxide/simethicone Suspension 30milliLiter(s) Oral every 6 hours PRN Dyspepsia      Allergies    IV Contrast (Unknown)    Intolerances        Vital Signs Last 24 Hrs  T(C): 36.7, Max: 37 ( @ 17:55)  T(F): 98, Max: 98.6 ( @ 17:55)  HR: 82 (76 - 82)  BP: 128/86 (110/72 - 134/86)  BP(mean): --  RR: 19 (16 - 19)  SpO2: 99% (99% - 100%)  Daily     Daily Weight in k.4 (25 Mar 2017 05:00)    PHYSICAL EXAM:    GENERAL: same  HEAD:  same  EYES:   ENMT:   NECK: posterior lump with scar same  NERVOUS SYSTEM:  same  CHEST/LUNG: clearer  HEART: no rub  ABDOMEN:   EXTREMITIES: leg edema same   LYMPH:   SKIN: no rash    LABS:                        13.6   8.9   )-----------( 138      ( 24 Mar 2017 08:11 )             42.1     25 Mar 2017 07:45    134    |  90     |  96.0   ----------------------------<  67     5.2     |  27.0   |  2.26     Ca    8.8        25 Mar 2017 07:45    TPro  6.4    /  Alb  2.6    /  TBili  0.5    /  DBili  x      /  AST  60     /  ALT  72     /  AlkPhos  88     25 Mar 2017 07:45                RADIOLOGY & ADDITIONAL TESTS:

## 2017-03-25 NOTE — PROGRESS NOTE ADULT - ASSESSMENT
This is 76Y W admitted s/p cardiac arrest, likely secondary to hypoglycemia induced hypercapnia (acute on chronic) from stupor causing increased myocardial demand. ROSC with return of mental status, therefore no modified hypothermia, intubated 3/17 extubated 3/18, was on dobutamine infusion for cardiogenic shock. Pt was transfer to , pt is being seen by cardiology, pulmonary and renal. Pt will need cardiac cath once renal function is better.     A/P    >S/P cardiac arrest with successful resuscitation  appreciate cardiology and pulmo input  cont. coreg at lower dose 6.25, titrate as tolerated, cont, aspirin, statin  pending cardiac cath- defer to cardiology, patient had significant acute on CKD   Pulmo started on Rocephin and azithromycin for pneumonia,     repeat CXR showed improved aeration and resolution of pulmonary edema   lidocaine patch for sternal pain - s/p CPR     >Acute on CKD - improving  appreciate renal input,   renal u/s - unremarkable       >Respiratory distress - appreciate pulmo input  Pulmo started on Rocephin,  azithromycin, solumedrol  repeat CXR - improving pulmonary edema     >DM- FS elevated- more than 300 partly due to steroid   ct Lantus 15,  humalog 3 units premeal  A1C- 8.9    DVT PPX - heparin 76 F admitted s/p cardiac arrest, likely secondary to hypoglycemia induced hypercapnia (acute on chronic) from stupor causing increased myocardial demand. ROSC with return of mental status, therefore no modified hypothermia, intubated 3/17 extubated 3/18, was on dobutamine infusion for cardiogenic shock. Pt was transferred to  and continues to be followed by cardiology and nephrology for optimization for possible cardiac cath once renal function improves.

## 2017-03-25 NOTE — PROGRESS NOTE ADULT - SUBJECTIVE AND OBJECTIVE BOX
INTERVAL HPI/OVERNIGHT EVENTS: Pt has been experieincing  pain in ribs and side forapst few days   given dilaudid now- with releif    No found to be in afib intermittently - No dyspnea  No dizzy or lightheadedness   +reprodiucible chest discomfort to palptation     Has occaisnal troublewith taking food- uncomfortable as she is swallowing food at times     MEDICATIONS  (STANDING):  heparin  Injectable 5000Unit(s) SubCutaneous every 12 hours  dextrose 50% Injectable 12.5Gram(s) IV Push once  dextrose 50% Injectable 25Gram(s) IV Push once  dextrose 50% Injectable 25Gram(s) IV Push once  aspirin  chewable 81milliGRAM(s) Oral daily  atorvastatin 20milliGRAM(s) Oral at bedtime  insulin lispro (HumaLOG) corrective regimen sliding scale  SubCutaneous Before meals and at bedtime  ALBUTerol/ipratropium for Nebulization 3milliLiter(s) Nebulizer every 6 hours  lidocaine   Patch 1Patch Transdermal daily  pantoprazole    Tablet 40milliGRAM(s) Oral before breakfast  famotidine    Tablet 20milliGRAM(s) Oral at bedtime  docusate sodium 100milliGRAM(s) Oral three times a day  senna 2Tablet(s) Oral at bedtime  carvedilol 6.25milliGRAM(s) Oral every 12 hours  insulin lispro Injectable (HumaLOG) 3Unit(s) SubCutaneous three times a day before meals  dextrose 5%. 1000milliLiter(s) IV Continuous <Continuous>  guaiFENesin    Syrup 100milliGRAM(s) Oral every 4 hours  furosemide   Injectable 80milliGRAM(s) IV Push two times a day  acetylcysteine  Oral Solution 1200milliGRAM(s) Oral two times a day  insulin glargine Injectable (LANTUS) 10Unit(s) SubCutaneous at bedtime    MEDICATIONS  (PRN):  dextrose Gel 1Dose(s) Oral once PRN Blood Glucose LESS THAN 70 milliGRAM(s)/deciLiter  glucagon  Injectable 1milliGRAM(s) IntraMuscular once PRN Glucose <70 milliGRAM(s)/deciLiter  HYDROmorphone  Injectable 0.25milliGRAM(s) IV Push every 4 hours PRN Severe Pain (7 - 10)  ALBUTerol    0.083% 2.5milliGRAM(s) Nebulizer every 3 hours PRN Shortness of Breath and/or Wheezing  acetaminophen   Tablet. 650milliGRAM(s) Oral every 6 hours PRN Moderate Pain (4 - 6)  aluminum hydroxide/magnesium hydroxide/simethicone Suspension 30milliLiter(s) Oral every 6 hours PRN Dyspepsia  metoprolol Injectable 5milliGRAM(s) IV Push every 15 minutes PRN for HR greater than 120 sustained a-fib      Allergies    IV Contrast (Unknown)    Intolerances        Review of systems:    Vital Signs Last 24 Hrs  T(C): 37.1, Max: 37.1 (03-25 @ 18:00)  T(F): 98.7, Max: 98.7 (03-25 @ 18:00)  HR: 87 (78 - 135)  BP: 135/72 (122/78 - 136/70)  BP(mean): --  RR: 17 (17 - 19)  SpO2: 99% (99% - 99%)    PHYSICAL EXAM:      Constitutional: NAD, , well-developed    Neck: No LAD, No JVD, trachea midline, no thyroid enlargement    Respiratory: CTAB  Cardiovascular: S1 and S2, RRR, no M/G/R but also had irregualr rhthym on monitor  Chest tender to touch over ribs on left side of chest and flank and xiphoid area   ABd- soft NT ND - only tender as i went over ribs  Extremities: +peripheral edema, no pedal lesions  Vascular: 2+ peripheral pulses  Neurological: A/O x 3, no focal deficits  Psychiatric: Normal mood, normal affect    Skin: No rashes, no acanthosis        LABS:                        13.6   8.9   )-----------( 138      ( 24 Mar 2017 08:11 )             42.1     25 Mar 2017 07:45    134    |  90     |  96.0   ----------------------------<  67     5.2     |  27.0   |  2.26     Ca    8.8        25 Mar 2017 07:45    TPro  6.4    /  Alb  2.6    /  TBili  0.5    /  DBili  x      /  AST  60     /  ALT  72     /  AlkPhos  88     25 Mar 2017 07:45          CAPILLARY BLOOD GLUCOSE  241 (25 Mar 2017 18:00)  147 (25 Mar 2017 11:00)  69 (25 Mar 2017 09:00)  362 (24 Mar 2017 21:57)  224 (24 Mar 2017 12:27)  120 (24 Mar 2017 09:00)  229 (23 Mar 2017 22:00)  386 (22 Mar 2017 22:10)  422 (22 Mar 2017 17:07)  475 (22 Mar 2017 11:32)  406 (22 Mar 2017 08:00)  379 (21 Mar 2017 23:14)      RADIOLOGY & ADDITIONAL TESTS:

## 2017-03-25 NOTE — PROGRESS NOTE ADULT - ASSESSMENT
T2DM- Am hypoglcyemia today to 68   steroids being tapered - PMD lowered Lantus already- will conitnue to monitor    Afib- intermittent - await cardiology input- pt still needs cath but awaiting improvement in renal function

## 2017-03-25 NOTE — PROGRESS NOTE ADULT - PROBLEM SELECTOR PLAN 4
Pt has low fs this am glucose:69  HBA1C:8.9   -Pt usually runs low in the morning will decrease night time lantus from 15 to ten units and monitor fingersticks closely   -meal time fingersticks running 150-250 will continue with monitoring and keeping range 150-200 given prior hypoglcemic episode  -steroids to be tapered and will help with hyperglycemia

## 2017-03-25 NOTE — PROGRESS NOTE ADULT - PROBLEM SELECTOR PLAN 2
-Slowly improving  -Nephrology f/u noted and appreciated   -c/w lasix 80mg IV BID, pt diuresing well will f/u am labs and see how pt is doing clinically and see if pt can be tapered to a lower dose

## 2017-03-26 NOTE — PROGRESS NOTE ADULT - PROBLEM SELECTOR PLAN 1
S/P cardiac arrest with successful resuscitation 3/17/2017  -Cardiology follow up noted and appreciated. Pending cardiac cath, SENTHIL slowly improving, but Pt is at high risk for renal failure with IV contrast and may therefore need dialysis. Discussed with patient in depth who will consider all her options and discuss further with her son prior to moving forward with any decisions.   -c/w coreg 6.25 po q12hrs   -c/w aspirin 81mg po qd  -c/w lipitor 20mg po qhs   -c/w lidocaine patch for sternal pain

## 2017-03-26 NOTE — PROGRESS NOTE ADULT - SUBJECTIVE AND OBJECTIVE BOX
Patient is a 76y old  Female who presents with a chief complaint of Unresponsive/Cardiac Arrest (17 Mar 2017 11:27)      HEALTH ISSUES - PROBLEM Dx:  Hypertension: Hypertension  Diabetes: Diabetes  Acute on chronic kidney failure: Acute on chronic kidney failure  Type 2 diabetes mellitus with nephropathy: Type 2 diabetes mellitus with nephropathy  Constipation, unspecified constipation type: Constipation, unspecified constipation type  Encounter for palliative care: Encounter for palliative care  Pain: Pain  Chronic congestive heart failure, unspecified congestive heart failure type: Chronic congestive heart failure, unspecified congestive heart failure type  Cough: Cough  Abnormal chest x-ray: Abnormal chest x-ray  Prophylactic measure: Prophylactic measure  Hypercarbia: Hypercarbia  Hypoxia: Hypoxia  Congestive heart failure: Congestive heart failure  Diabetes mellitus of other type with oral complication, without long-term current use of insulin: Diabetes mellitus of other type with oral complication, without long-term current use of insulin  Kidney disease: Kidney disease  Cardiopulmonary arrest with successful resuscitation: Cardiopulmonary arrest with successful resuscitation  Hypoglycemia: Hypoglycemia  SENTHIL (acute kidney injury): SENTHIL (acute kidney injury)  Respiratory distress: Respiratory distress  Cardiac arrest: Cardiac arrest      INTERVAL HPI/OVERNIGHT EVENTS:  Patient seen and examined at bedside. No acute events overnight. Patient states that she is feeling well today, was experiencing left sided sternal chest pain this morning 10/10 which improved with pain medication, currently pain is 3/10. Discussed with patient in regards to the current plan of care and the need for cardiac catheterization and risk of renal failure and possible need for dialysis. She would like to have further discussions in regards to this when her son Eben comes to the hospital. Patient denies SOB, abd pain, N/V, fever, chills, dysuria or any other complaints. All remainder ROS negative.     Vital Signs Last 24 Hrs  T(C): 36.9, Max: 37.1 (03-25 @ 18:00)  T(F): 98.4, Max: 98.7 (03-25 @ 18:00)  HR: 80 (80 - 135)  BP: 92/58 (90/54 - 136/70)  BP(mean): --  RR: 16 (16 - 20)  SpO2: 100% (94% - 100%)    CAPILLARY BLOOD GLUCOSE  221 (26 Mar 2017 11:38)  130 (26 Mar 2017 08:00)  335 (25 Mar 2017 22:59)  241 (25 Mar 2017 18:00)      I&O's Summary    I & Os for current day (as of 26 Mar 2017 13:04)  =============================================  IN: 870 ml / OUT: 1750 ml / NET: -880 ml      CONSTITUTIONAL: Well appearing, well nourished, awake, alert and in no apparent distress  CARDIAC: Normal rate, regular rhythm.  Heart sounds S1, S2.  No murmurs, rubs or gallops   RESPIRATORY: Decreased BS bilaterally. Minimal exp wheezing   ABDOMEN: Soft, NT ND BS+  EXTREMITIES: +1 b/l peripheral edema, cyanosis or deformity   SKIN: No rash, chronic skin changes     MEDICATIONS  (STANDING):  heparin  Injectable 5000Unit(s) SubCutaneous every 12 hours  dextrose 50% Injectable 12.5Gram(s) IV Push once  dextrose 50% Injectable 25Gram(s) IV Push once  dextrose 50% Injectable 25Gram(s) IV Push once  aspirin  chewable 81milliGRAM(s) Oral daily  atorvastatin 20milliGRAM(s) Oral at bedtime  insulin lispro (HumaLOG) corrective regimen sliding scale  SubCutaneous Before meals and at bedtime  ALBUTerol/ipratropium for Nebulization 3milliLiter(s) Nebulizer every 6 hours  lidocaine   Patch 1Patch Transdermal daily  pantoprazole    Tablet 40milliGRAM(s) Oral before breakfast  famotidine    Tablet 20milliGRAM(s) Oral at bedtime  docusate sodium 100milliGRAM(s) Oral three times a day  senna 2Tablet(s) Oral at bedtime  carvedilol 6.25milliGRAM(s) Oral every 12 hours  insulin lispro Injectable (HumaLOG) 3Unit(s) SubCutaneous three times a day before meals  dextrose 5%. 1000milliLiter(s) IV Continuous <Continuous>  guaiFENesin    Syrup 100milliGRAM(s) Oral every 4 hours  furosemide   Injectable 80milliGRAM(s) IV Push two times a day  predniSONE   Tablet 30milliGRAM(s) Oral daily  insulin glargine Injectable (LANTUS) 10Unit(s) SubCutaneous at bedtime    MEDICATIONS  (PRN):  dextrose Gel 1Dose(s) Oral once PRN Blood Glucose LESS THAN 70 milliGRAM(s)/deciLiter  glucagon  Injectable 1milliGRAM(s) IntraMuscular once PRN Glucose <70 milliGRAM(s)/deciLiter  ALBUTerol    0.083% 2.5milliGRAM(s) Nebulizer every 3 hours PRN Shortness of Breath and/or Wheezing  acetaminophen   Tablet. 650milliGRAM(s) Oral every 6 hours PRN Moderate Pain (4 - 6)  aluminum hydroxide/magnesium hydroxide/simethicone Suspension 30milliLiter(s) Oral every 6 hours PRN Dyspepsia  metoprolol Injectable 5milliGRAM(s) IV Push every 15 minutes PRN for HR greater than 120 sustained a-fib      LABS:                        11.3   9.8   )-----------( 175      ( 26 Mar 2017 09:56 )             35.1     26 Mar 2017 08:13    140    |  94     |  90.0   ----------------------------<  113    5.6     |  33.0   |  2.22     Ca    9.0        26 Mar 2017 08:13  Phos  3.3       26 Mar 2017 08:13  Mg     2.1       26 Mar 2017 08:13    TPro  6.2    /  Alb  2.7    /  TBili  0.6    /  DBili  x      /  AST  43     /  ALT  56     /  AlkPhos  80     26 Mar 2017 08:13        LIVER FUNCTIONS - ( 26 Mar 2017 08:13 )  Alb: 2.7 g/dL / Pro: 6.2 g/dL / ALK PHOS: 80 U/L / ALT: 56 U/L / AST: 43 U/L / GGT: x

## 2017-03-26 NOTE — PROGRESS NOTE ADULT - SUBJECTIVE AND OBJECTIVE BOX
[facility], [facility]                                                    [address] , [address], [place] [address]                                                         PHONE: [phone]    FAX: [phone] [address], [address], [place] [address]                                                 PHONE: [phone]    FAX: [phone]  *******************************************************************************    Overnight events/Subjective Assessment:    INTERPRETATION OF TELEMETRY (personally reviewed):    IV Contrast (Unknown)    MEDICATIONS  (STANDING):  heparin  Injectable 5000Unit(s) SubCutaneous every 12 hours  dextrose 50% Injectable 12.5Gram(s) IV Push once  dextrose 50% Injectable 25Gram(s) IV Push once  dextrose 50% Injectable 25Gram(s) IV Push once  aspirin  chewable 81milliGRAM(s) Oral daily  atorvastatin 20milliGRAM(s) Oral at bedtime  insulin lispro (HumaLOG) corrective regimen sliding scale  SubCutaneous Before meals and at bedtime  ALBUTerol/ipratropium for Nebulization 3milliLiter(s) Nebulizer every 6 hours  lidocaine   Patch 1Patch Transdermal daily  pantoprazole    Tablet 40milliGRAM(s) Oral before breakfast  famotidine    Tablet 20milliGRAM(s) Oral at bedtime  docusate sodium 100milliGRAM(s) Oral three times a day  senna 2Tablet(s) Oral at bedtime  carvedilol 6.25milliGRAM(s) Oral every 12 hours  insulin lispro Injectable (HumaLOG) 3Unit(s) SubCutaneous three times a day before meals  dextrose 5%. 1000milliLiter(s) IV Continuous <Continuous>  guaiFENesin    Syrup 100milliGRAM(s) Oral every 4 hours  furosemide   Injectable 80milliGRAM(s) IV Push two times a day  predniSONE   Tablet 30milliGRAM(s) Oral daily  insulin glargine Injectable (LANTUS) 10Unit(s) SubCutaneous at bedtime    MEDICATIONS  (PRN):  dextrose Gel 1Dose(s) Oral once PRN Blood Glucose LESS THAN 70 milliGRAM(s)/deciLiter  glucagon  Injectable 1milliGRAM(s) IntraMuscular once PRN Glucose <70 milliGRAM(s)/deciLiter  ALBUTerol    0.083% 2.5milliGRAM(s) Nebulizer every 3 hours PRN Shortness of Breath and/or Wheezing  acetaminophen   Tablet. 650milliGRAM(s) Oral every 6 hours PRN Moderate Pain (4 - 6)  aluminum hydroxide/magnesium hydroxide/simethicone Suspension 30milliLiter(s) Oral every 6 hours PRN Dyspepsia  metoprolol Injectable 5milliGRAM(s) IV Push every 15 minutes PRN for HR greater than 120 sustained a-fib      Vital Signs Last 24 Hrs  T(C): 36.8, Max: 37.1 (03-25 @ 18:00)  T(F): 98.3, Max: 98.7 (03-25 @ 18:00)  HR: 82 (79 - 135)  BP: 90/54 (90/54 - 136/70)  BP(mean): --  RR: 18 (17 - 20)  SpO2: 99% (99% - 100%)    I&O's Detail    I & Os for current day (as of 26 Mar 2017 07:44)  =============================================  IN:    Oral Fluid: 870 ml    Total IN: 870 ml  ---------------------------------------------  OUT:    Voided: 1750 ml    Total OUT: 1750 ml  ---------------------------------------------  Total NET: -880 ml    I&O's Summary    I & Os for current day (as of 26 Mar 2017 07:44)  =============================================  IN: 870 ml / OUT: 1750 ml / NET: -880 ml          PHYSICAL EXAM:  General: Appears well developed, well nourished, no acute distress  HEENT: Head: normocephalic, atraumatic  Eyes: Pupils equal and reactive  Neck: Supple, no carotid bruit, no JVD, no HJR  CARDIOVASCULAR: Normal S1 and S2, no murmur, rub, or gallop  LUNGS: Clear to auscultation bilaterally, no rales, rhonchi or wheeze  ABDOMEN: Soft, nontender, non-distended, positive bowel sounds, no mass or bruit  EXTREMITIES: No edema, distal pulses WNL  SKIN: Warm and dry with normal turgor  NEURO: Alert & oriented x 3, grossly intact  PSYCH: normal mood and affect        LABS:                        13.6   8.9   )-----------( 138      ( 24 Mar 2017 08:11 )             42.1     25 Mar 2017 07:45    134    |  90     |  96.0   ----------------------------<  67     5.2     |  27.0   |  2.26     Ca    8.8        25 Mar 2017 07:45    TPro  6.4    /  Alb  2.6    /  TBili  0.5    /  DBili  x      /  AST  60     /  ALT  72     /  AlkPhos  88     25 Mar 2017 07:45          serum  Lipids:   Hemoglobin A1C, Whole Blood: 8.9 % (03-18 @ 06:25)        RADIOLOGY & ADDITIONAL STUDIES:    ECG:    ECHO:    STRESS TEST:    CARDIAC CATHETERIZATION:    ASSESSMENT AND PLAN:  In summary, SOCORRO JARVIS is a 76y Female with past medical history significant for DM,HTN,CRF, s/p cardiac arrest , decr EF, s/p PAF now no new cv complaints sitting in chair comfortable  - ideally pt needs to be on full AC would transition to renal dose Lovenox and eventual oral AC however pt reports hx fall (via translation) ? ideal long term ac candidate  - Nephrology notes appreciated, pt would be at elevated risk for cardiac cath, pt and family would need to accept this risk before proceeding  - cont current meds  - on BB, limited ability to increase further due to low BP      Angel Dunne, DO

## 2017-03-26 NOTE — PROGRESS NOTE ADULT - PROBLEM SELECTOR PLAN 2
-Slowly improving  -will decrease lasix 80mg to 40mg IV BID and ask nephrology for further recommendations given patients BP is unable to tolerate 80mg bid -Slowly improving  -will decrease lasix 80mg to 40mg IV BID and ask nephrology for further recommendations given patients BP is unable to tolerate 80mg bid  -BMP shows hyperkalemia but hemolyzed will repeat bmp

## 2017-03-26 NOTE — PROGRESS NOTE ADULT - ASSESSMENT
76 F admitted s/p cardiac arrest, likely secondary to hypoglycemia induced hypercapnia (acute on chronic) from stupor causing increased myocardial demand. ROSC with return of mental status, therefore no modified hypothermia, intubated 3/17 extubated 3/18, was on dobutamine infusion for cardiogenic shock. Pt was transferred to  and continues to be followed by cardiology and nephrology for optimization for possible cardiac cath once renal function improves. Discussed with the patient in regards to cardiac catheterization and possible risk of worsening renal failure and possible need for HD, patient would to discuss her options with her son.

## 2017-03-26 NOTE — PROGRESS NOTE ADULT - PROBLEM SELECTOR PLAN 5
FS stable this morning was 130     HBA1C:8.9   -will c/w lantus 10units qhs   -meal time fingersticks running 150-250 will continue with monitoring and keeping range 150-200 given prior hypoglcemic episode  -steroids to be tapered and will help with hyperglycemia

## 2017-03-26 NOTE — PROGRESS NOTE ADULT - PROBLEM SELECTOR PLAN 6
BP remains low - will c/w monitoring pt was not given morning coreg or lasix   -c/w coreg 6.25mg po q12hrs

## 2017-03-26 NOTE — PROGRESS NOTE ADULT - PROBLEM SELECTOR PLAN 4
PAF pt has few episodes of elevated HR at times  -c/w coreg 6.25mg po q12hrs - bp is running low so morning dose was held  -cardiology f/u noted and appreciated will d/w patient and son long term anticoagulation, if agreeable will start full anticoagulation. Agree that pt may be high risks for falls and may not be a good candidate for anticoagulation.

## 2017-03-27 NOTE — PROGRESS NOTE ADULT - PROBLEM SELECTOR PLAN 4
s/p respiratory distress; h/o asthma   -repeat cxr shows improvement s/p abx and patients pulm edema improved  -c/w tapering steroids  -changed to prednisone 20mg po qd   -c/w bronchodilators  -c/w lasix but at decreased dose 40mg IV BID

## 2017-03-27 NOTE — PROGRESS NOTE ADULT - PROBLEM SELECTOR PLAN 7
BP remains low - will c/w monitoring pt was not given morning coreg or lasix   -c/w coreg 6.25mg po q12hrs Bp stable today no longer running low   -c/w coreg 6.25mg po q12hrs

## 2017-03-27 NOTE — PROGRESS NOTE ADULT - ASSESSMENT
76 F admitted s/p cardiac arrest, likely secondary to hypoglycemia induced hypercapnia (acute on chronic) from stupor causing increased myocardial demand. ROSC with return of mental status, therefore no modified hypothermia, intubated 3/17 extubated 3/18, was on dobutamine infusion for cardiogenic shock. Pt was transferred to  and continues to be followed by cardiology and nephrology for optimization for possible cardiac cath once renal function improves vs continuing with medical management. Patient and her son would like to proceed with cardiac catheterization knowing the risks involved of worsening renal function including dialysis. Cardiology to discuss with the interventional team in regards to the plan of care.

## 2017-03-27 NOTE — PROGRESS NOTE ADULT - SUBJECTIVE AND OBJECTIVE BOX
NEPHROLOGY INTERVAL HPI/OVERNIGHT EVENTS: No new events.    MEDICATIONS  (STANDING):  dextrose 50% Injectable 12.5Gram(s) IV Push once  dextrose 50% Injectable 25Gram(s) IV Push once  dextrose 50% Injectable 25Gram(s) IV Push once  aspirin  chewable 81milliGRAM(s) Oral daily  atorvastatin 20milliGRAM(s) Oral at bedtime  insulin lispro (HumaLOG) corrective regimen sliding scale  SubCutaneous Before meals and at bedtime  ALBUTerol/ipratropium for Nebulization 3milliLiter(s) Nebulizer every 6 hours  lidocaine   Patch 1Patch Transdermal daily  pantoprazole    Tablet 40milliGRAM(s) Oral before breakfast  famotidine    Tablet 20milliGRAM(s) Oral at bedtime  docusate sodium 100milliGRAM(s) Oral three times a day  senna 2Tablet(s) Oral at bedtime  carvedilol 6.25milliGRAM(s) Oral every 12 hours  dextrose 5%. 1000milliLiter(s) IV Continuous <Continuous>  guaiFENesin    Syrup 100milliGRAM(s) Oral every 4 hours  insulin glargine Injectable (LANTUS) 10Unit(s) SubCutaneous at bedtime  furosemide   Injectable 40milliGRAM(s) IV Push every 12 hours  enoxaparin Injectable 70milliGRAM(s) SubCutaneous daily  warfarin 2.5milliGRAM(s) Oral <User Schedule>  insulin lispro Injectable (HumaLOG) 6Unit(s) SubCutaneous three times a day before meals    MEDICATIONS  (PRN):  dextrose Gel 1Dose(s) Oral once PRN Blood Glucose LESS THAN 70 milliGRAM(s)/deciLiter  glucagon  Injectable 1milliGRAM(s) IntraMuscular once PRN Glucose <70 milliGRAM(s)/deciLiter  ALBUTerol    0.083% 2.5milliGRAM(s) Nebulizer every 3 hours PRN Shortness of Breath and/or Wheezing  acetaminophen   Tablet. 650milliGRAM(s) Oral every 6 hours PRN Moderate Pain (4 - 6)  aluminum hydroxide/magnesium hydroxide/simethicone Suspension 30milliLiter(s) Oral every 6 hours PRN Dyspepsia  metoprolol Injectable 5milliGRAM(s) IV Push every 15 minutes PRN for HR greater than 120 sustained a-fib      Allergies    IV Contrast (Unknown)    Intolerances        Vital Signs Last 24 Hrs  T(C): 37.1, Max: 37.1 ( @ 17:19)  T(F): 98.7, Max: 98.7 ( @ 17:19)  HR: 89 (79 - 96)  BP: 116/54 (110/62 - 139/50)  BP(mean): --  RR: 16 (16 - 18)  SpO2: 91% (91% - 100%)  Daily Weight in k.5 (27 Mar 2017 05:18)    PHYSICAL EXAM:    GENERAL:   HEAD:  wnl  EYES:   ENMT:   NECK: veins same  NERVOUS SYSTEM:  awake alert  CHEST/LUNG: nasal 02  HEART: same  ABDOMEN:   EXTREMITIES: same   LYMPH:   SKIN: no rash    LABS:             27 Mar 2017 07:20    135    |  90     |  80.0   ----------------------------<  243    4.8     |  34.0   |  2.21     Ca    9.2        27 Mar 2017 07:20  Phos  3.3       27 Mar 2017 07:20  Mg     1.9       27 Mar 2017 07:20    TPro  6.2    /  Alb  2.7    /  TBili  0.6    /  DBili  x      /  AST  43     /  ALT  56     /  AlkPhos  80     26 Mar 2017 08:13      Ca    9.2        27 Mar 2017 07:20  Phos  3.3       27 Mar 2017 07:20  Mg     1.9       27 Mar 2017 07:20    TPro  6.2    /  Alb  2.7    /  TBili  0.6    /  DBili  x      /  AST  43     /  ALT  56     /  AlkPhos  80     26 Mar 2017 08:13        Magnesium, Serum: 1.9 mg/dL ( @ 07:20)  Phosphorus Level, Serum: 3.3 mg/dL ( @ 07:20)          RADIOLOGY & ADDITIONAL TESTS:

## 2017-03-27 NOTE — PROGRESS NOTE ADULT - SUBJECTIVE AND OBJECTIVE BOX
Patient is a 76y old  Female who presents with a chief complaint of Unresponsive/Cardiac Arrest (17 Mar 2017 11:27)      HEALTH ISSUES - PROBLEM Dx:  Atrial fibrillation: Atrial fibrillation  Hypertension: Hypertension  Diabetes: Diabetes  Acute on chronic kidney failure: Acute on chronic kidney failure  Type 2 diabetes mellitus with nephropathy: Type 2 diabetes mellitus with nephropathy  Constipation, unspecified constipation type: Constipation, unspecified constipation type  Encounter for palliative care: Encounter for palliative care  Pain: Pain  Chronic congestive heart failure, unspecified congestive heart failure type: Chronic congestive heart failure, unspecified congestive heart failure type  Cough: Cough  Abnormal chest x-ray: Abnormal chest x-ray  Prophylactic measure: Prophylactic measure  Hypercarbia: Hypercarbia  Hypoxia: Hypoxia  Congestive heart failure: Congestive heart failure  Diabetes mellitus of other type with oral complication, without long-term current use of insulin: Diabetes mellitus of other type with oral complication, without long-term current use of insulin  Kidney disease: Kidney disease  Cardiopulmonary arrest with successful resuscitation: Cardiopulmonary arrest with successful resuscitation  Hypoglycemia: Hypoglycemia  SENTHIL (acute kidney injury): SENTHIL (acute kidney injury)  Respiratory distress: Respiratory distress  Cardiac arrest: Cardiac arrest      INTERVAL HPI/OVERNIGHT EVENTS:  Patient seen and examined at bedside. No acute events overnight. Patient states she is feeling well, still experiencing sternal chest pain 3/10 this morning, but controlled on current pain medication regimen. Son Kristian was on the phone and d/w him in regards to the plan of care and he would like to proceed with cardiac catheterization if possible knowing the risks involve the patient potentially being on dialysis given her poor renal function. Pt's son and patient also agree with full dose anticoagulation for the risk of stroke given the patients diagnosis of A-fib. Patient and her son understand the risk of bleeding as a potential side effect of the medications being started and agree to proceed with anticoagulation. Patient denies prior GI bleeding, falls, CVA or bleeding risks. Patient denies SOB, abd pain, N/V, fever, chills, dysuria or any other complaints. All remainder ROS negative.     Vital Signs Last 24 Hrs  T(C): 37.1, Max: 37.1 (03-26 @ 17:19)  T(F): 98.7, Max: 98.7 (03-26 @ 17:19)  HR: 89 (79 - 96)  BP: 116/54 (110/62 - 139/50)  BP(mean): --  RR: 16 (16 - 18)  SpO2: 91% (91% - 100%)    CAPILLARY BLOOD GLUCOSE  273 (27 Mar 2017 08:00)  305 (26 Mar 2017 22:17)  302 (26 Mar 2017 18:00)      I&O's Summary  I & Os for 24h ending 27 Mar 2017 07:00  =============================================  IN: 920 ml / OUT: 1500 ml / NET: -580 ml    I & Os for current day (as of 27 Mar 2017 11:44)  =============================================  IN: 240 ml / OUT: 500 ml / NET: -260 ml      CONSTITUTIONAL: Well appearing, well nourished, awake, alert and in no apparent distress  CARDIAC: Normal rate, regular rhythm.  Heart sounds S1, S2.  No murmurs, rubs or gallops, pain on palpation to sternum and left chest   RESPIRATORY: Decreased BS bilaterally. Minimal exp wheezing   ABDOMEN: Soft, NT ND BS+  EXTREMITIES: +1 b/l peripheral edema, cyanosis or deformity   SKIN: No rash, chronic skin changes     MEDICATIONS  (STANDING):  dextrose 50% Injectable 12.5Gram(s) IV Push once  dextrose 50% Injectable 25Gram(s) IV Push once  dextrose 50% Injectable 25Gram(s) IV Push once  aspirin  chewable 81milliGRAM(s) Oral daily  atorvastatin 20milliGRAM(s) Oral at bedtime  insulin lispro (HumaLOG) corrective regimen sliding scale  SubCutaneous Before meals and at bedtime  ALBUTerol/ipratropium for Nebulization 3milliLiter(s) Nebulizer every 6 hours  lidocaine   Patch 1Patch Transdermal daily  pantoprazole    Tablet 40milliGRAM(s) Oral before breakfast  famotidine    Tablet 20milliGRAM(s) Oral at bedtime  docusate sodium 100milliGRAM(s) Oral three times a day  senna 2Tablet(s) Oral at bedtime  carvedilol 6.25milliGRAM(s) Oral every 12 hours  dextrose 5%. 1000milliLiter(s) IV Continuous <Continuous>  guaiFENesin    Syrup 100milliGRAM(s) Oral every 4 hours  insulin glargine Injectable (LANTUS) 10Unit(s) SubCutaneous at bedtime  furosemide   Injectable 40milliGRAM(s) IV Push every 12 hours  enoxaparin Injectable 70milliGRAM(s) SubCutaneous daily  warfarin 2.5milliGRAM(s) Oral <User Schedule>  insulin lispro Injectable (HumaLOG) 6Unit(s) SubCutaneous three times a day before meals    MEDICATIONS  (PRN):  dextrose Gel 1Dose(s) Oral once PRN Blood Glucose LESS THAN 70 milliGRAM(s)/deciLiter  glucagon  Injectable 1milliGRAM(s) IntraMuscular once PRN Glucose <70 milliGRAM(s)/deciLiter  ALBUTerol    0.083% 2.5milliGRAM(s) Nebulizer every 3 hours PRN Shortness of Breath and/or Wheezing  acetaminophen   Tablet. 650milliGRAM(s) Oral every 6 hours PRN Moderate Pain (4 - 6)  aluminum hydroxide/magnesium hydroxide/simethicone Suspension 30milliLiter(s) Oral every 6 hours PRN Dyspepsia  metoprolol Injectable 5milliGRAM(s) IV Push every 15 minutes PRN for HR greater than 120 sustained a-fib      LABS:                        10.6   11.1  )-----------( 172      ( 27 Mar 2017 07:20 )             31.9     27 Mar 2017 07:20    135    |  90     |  80.0   ----------------------------<  243    4.8     |  34.0   |  2.21     Ca    9.2        27 Mar 2017 07:20  Phos  3.3       27 Mar 2017 07:20  Mg     1.9       27 Mar 2017 07:20    TPro  6.2    /  Alb  2.7    /  TBili  0.6    /  DBili  x      /  AST  43     /  ALT  56     /  AlkPhos  80     26 Mar 2017 08:13        LIVER FUNCTIONS - ( 26 Mar 2017 08:13 )  Alb: 2.7 g/dL / Pro: 6.2 g/dL / ALK PHOS: 80 U/L / ALT: 56 U/L / AST: 43 U/L / GGT: x

## 2017-03-27 NOTE — PROGRESS NOTE ADULT - SUBJECTIVE AND OBJECTIVE BOX
Nurse Practitioner Progress note:       TELEMETRY: NSR    T(C): 37.2, Max: 37.2 (03-27 @ 16:00)  HR: 92 (79 - 100)  BP: 139/65 (110/62 - 163/72)  RR: 20 (16 - 20)  SpO2: 99% (91% - 100%)      PHYSICAL EXAM:  Appearance: Normal	  Cardiovascular: Regular  Respiratory: Wheezing, rales, rhonci noted  Neurologic: Non-focal, A&O X3.  No neuro deficits  Procedure Site: R arm brachial site benign.  No bleeding/hematoma/ecchymosis.    	    LABS:	 	                          10.6   11.1  )-----------( 172      ( 27 Mar 2017 07:20 )             31.9     27 Mar 2017 07:20    135    |  90     |  80.0   ----------------------------<  243    4.8     |  34.0   |  2.21     Ca    9.2        27 Mar 2017 07:20  Phos  3.3       27 Mar 2017 07:20  Mg     1.9       27 Mar 2017 07:20    TPro  6.2    /  Alb  2.7    /  TBili  0.6    /  DBili  x      /  AST  43     /  ALT  56     /  AlkPhos  80     26 Mar 2017 08:13      PROCEDURE RESULTS: S/P RHC via R Brachial vein.      ASSESSMENT/PLAN: 	  -Resume meds  -Diuresis as tolerated  -Will defer LHC until pt able to lay flat  -Please call with questions/concerns  -Site check in AM

## 2017-03-27 NOTE — PROGRESS NOTE ADULT - ASSESSMENT
T2DM-   steroids being tapered , but still high doses.  continue lantus 10 u HS and increase lispro to 6 u TID w meals since she spikes every times she eats, at least till steroids at a lower dose.     Afib- intermittent - await cardiology input- pt still needs cath but awaiting improvement in renal function

## 2017-03-27 NOTE — PROGRESS NOTE ADULT - SUBJECTIVE AND OBJECTIVE BOX
INTERVAL HPI/OVERNIGHT EVENTS: Pt has been experieincing  pain in ribs and side forapst few days   given dilaudid now- with releif    No found to be in afib intermittently - No dyspnea  No dizzy or lightheadedness   reproducible chest discomfort to palptation   Has occasional trouble with taking food- uncomfortable as she is swallowing food at times     MEDICATIONS  (STANDING):  dextrose 50% Injectable 12.5Gram(s) IV Push once  dextrose 50% Injectable 25Gram(s) IV Push once  dextrose 50% Injectable 25Gram(s) IV Push once  aspirin  chewable 81milliGRAM(s) Oral daily  atorvastatin 20milliGRAM(s) Oral at bedtime  insulin lispro (HumaLOG) corrective regimen sliding scale  SubCutaneous Before meals and at bedtime  ALBUTerol/ipratropium for Nebulization 3milliLiter(s) Nebulizer every 6 hours  lidocaine   Patch 1Patch Transdermal daily  pantoprazole    Tablet 40milliGRAM(s) Oral before breakfast  famotidine    Tablet 20milliGRAM(s) Oral at bedtime  docusate sodium 100milliGRAM(s) Oral three times a day  senna 2Tablet(s) Oral at bedtime  carvedilol 6.25milliGRAM(s) Oral every 12 hours  insulin lispro Injectable (HumaLOG) 3Unit(s) SubCutaneous three times a day before meals  dextrose 5%. 1000milliLiter(s) IV Continuous <Continuous>  guaiFENesin    Syrup 100milliGRAM(s) Oral every 4 hours  predniSONE   Tablet 30milliGRAM(s) Oral daily  insulin glargine Injectable (LANTUS) 10Unit(s) SubCutaneous at bedtime  furosemide   Injectable 40milliGRAM(s) IV Push every 12 hours    MEDICATIONS  (PRN):  dextrose Gel 1Dose(s) Oral once PRN Blood Glucose LESS THAN 70 milliGRAM(s)/deciLiter  glucagon  Injectable 1milliGRAM(s) IntraMuscular once PRN Glucose <70 milliGRAM(s)/deciLiter  ALBUTerol    0.083% 2.5milliGRAM(s) Nebulizer every 3 hours PRN Shortness of Breath and/or Wheezing  acetaminophen   Tablet. 650milliGRAM(s) Oral every 6 hours PRN Moderate Pain (4 - 6)  aluminum hydroxide/magnesium hydroxide/simethicone Suspension 30milliLiter(s) Oral every 6 hours PRN Dyspepsia  metoprolol Injectable 5milliGRAM(s) IV Push every 15 minutes PRN for HR greater than 120 sustained a-fib    Allergies  IV Contrast (Unknown)    Review of systems: no SOb, no CP, feels tired, eating better and better appetite, no diarrhea, no dizziness.     Vital Signs Last 24 Hrs  T(C): 37.1, Max: 37.1 (03-26 @ 17:19)  T(F): 98.7, Max: 98.7 (03-26 @ 17:19)  HR: 89 (79 - 96)  BP: 116/54 (92/58 - 139/50)  BP(mean): --  RR: 16 (16 - 18)  SpO2: 91% (91% - 100%)    PHYSICAL EXAM:  Constitutional: NAD, well-groomed, well-developed  HEENT: PERRLA, EOMI, no exophalmos  Neck: No LAD, No JVD, trachea midline, no thyroid enlargement  Respiratory: CTAB, tachycardia   Cardiovascular: S1 and S2, RRR, no M/G/R  Gastrointestinal: BS+, soft, no organomeglag or mass  Extremities: No peripheral edema, no pedal lesions  Vascular: 2+ peripheral pulses  Neurological: A/O x 3, no focal deficits  Psychiatric: Normal mood, normal affect  Musculoskeletal: 5/5 strength b/l upper and lower extremities  Skin: No rashes, no acanthosis    LABS:                        10.6   11.1  )-----------( 172      ( 27 Mar 2017 07:20 )             31.9     27 Mar 2017 07:20    135    |  90     |  80.0   ----------------------------<  243    4.8     |  34.0   |  2.21     Ca    9.2        27 Mar 2017 07:20  Phos  3.3       27 Mar 2017 07:20  Mg     1.9       27 Mar 2017 07:20    TPro  6.2    /  Alb  2.7    /  TBili  0.6    /  DBili  x      /  AST  43     /  ALT  56     /  AlkPhos  80     26 Mar 2017 08:13      CAPILLARY BLOOD GLUCOSE  273 (27 Mar 2017 08:00)  305 (26 Mar 2017 22:17)  302 (26 Mar 2017 18:00)  221 (26 Mar 2017 11:38)  130 (26 Mar 2017 08:00)  335 (25 Mar 2017 22:59)  241 (25 Mar 2017 18:00)  147 (25 Mar 2017 11:00)  69 (25 Mar 2017 09:00)  362 (24 Mar 2017 21:57)  224 (24 Mar 2017 12:27)  120 (24 Mar 2017 09:00)  229 (23 Mar 2017 22:00)      RADIOLOGY & ADDITIONAL TESTS:

## 2017-03-27 NOTE — PROGRESS NOTE ADULT - SUBJECTIVE AND OBJECTIVE BOX
INTERVAL HISTORY: Denies CP, SOB  	  MEDICATIONS:  carvedilol 6.25milliGRAM(s) Oral every 12 hours  metoprolol Injectable 5milliGRAM(s) IV Push every 15 minutes PRN  furosemide   Injectable 40milliGRAM(s) IV Push every 12 hours  ALBUTerol/ipratropium for Nebulization 3milliLiter(s) Nebulizer every 6 hours  ALBUTerol    0.083% 2.5milliGRAM(s) Nebulizer every 3 hours PRN  guaiFENesin    Syrup 100milliGRAM(s) Oral every 4 hours  acetaminophen   Tablet. 650milliGRAM(s) Oral every 6 hours PRN  pantoprazole    Tablet 40milliGRAM(s) Oral before breakfast  famotidine    Tablet 20milliGRAM(s) Oral at bedtime  docusate sodium 100milliGRAM(s) Oral three times a day  senna 2Tablet(s) Oral at bedtime  aluminum hydroxide/magnesium hydroxide/simethicone Suspension 30milliLiter(s) Oral every 6 hours PRN  dextrose Gel 1Dose(s) Oral once PRN  dextrose 50% Injectable 12.5Gram(s) IV Push once  dextrose 50% Injectable 25Gram(s) IV Push once  dextrose 50% Injectable 25Gram(s) IV Push once  glucagon  Injectable 1milliGRAM(s) IntraMuscular once PRN  atorvastatin 20milliGRAM(s) Oral at bedtime  insulin lispro (HumaLOG) corrective regimen sliding scale  SubCutaneous Before meals and at bedtime  insulin lispro Injectable (HumaLOG) 3Unit(s) SubCutaneous three times a day before meals  predniSONE   Tablet 30milliGRAM(s) Oral daily  insulin glargine Injectable (LANTUS) 10Unit(s) SubCutaneous at bedtime  heparin  Injectable 5000Unit(s) SubCutaneous every 12 hours  aspirin  chewable 81milliGRAM(s) Oral daily  lidocaine   Patch 1Patch Transdermal daily  dextrose 5%. 1000milliLiter(s) IV Continuous <Continuous>        PHYSICAL EXAM:  T(C): 36.9, Max: 37.1 (03-26 @ 17:19)  HR: 95 (79 - 96)  BP: 110/62 (90/60 - 139/50)  RR: 18 (16 - 18)  SpO2: 100% (94% - 100%)  Wt(kg): --  I&O's Summary    I & Os for current day (as of 27 Mar 2017 09:08)  =============================================  IN: 920 ml / OUT: 1500 ml / NET: -580 ml        Appearance: Normal	  HEENT:   Normal oral mucosa, PERRL, EOMI	  Lymphatic: No lymphadenopathy  Cardiovascular: Normal S1 S2, No JVD, No murmurs, No edema  Respiratory: Lungs clear to auscultation	  Psychiatry: A & O x 3, Mood & affect appropriate  Gastrointestinal:  Soft, Non-tender, + BS	  Skin: No rashes, No ecchymoses, No cyanosis  Neurologic: Non-focal  Extremities: Normal range of motion, No clubbing, cyanosis or edema  Vascular: Peripheral pulses palpable 2+ bilaterally  	    LABS:	 	    CARDIAC MARKERS:                        10.6   11.1  )-----------( 172      ( 27 Mar 2017 07:20 )             31.9     27 Mar 2017 07:20    135    |  90     |  80.0   ----------------------------<  243    4.8     |  34.0   |  2.21     Ca    9.2        27 Mar 2017 07:20  Phos  3.3       27 Mar 2017 07:20  Mg     1.9       27 Mar 2017 07:20    TPro  6.2    /  Alb  2.7    /  TBili  0.6    /  DBili  x      /  AST  43     /  ALT  56     /  AlkPhos  80     26 Mar 2017 08:13    proBNP:   Lipid Profile:   HgA1c:   TSH:     ASSESSMENT/PLAN:   SOCORRO JARVIS is a 76y Female with past medical history significant for DM, HTN, CRF, s/p cardiac arrest, decr EF, s/p PAF now no new CV complaints   - Ideally pt needs to be on full AC would transition to renal dose Lovenox and eventual oral AC however pt reports hx fall (via translation) May not be a good long term a/c candidate  - Nephrology notes appreciated, at risk of needing dialysis with contrast exposure, likely conservative medical therapy.  - Cont current meds  - On BB, limited ability to increase further due to low BP

## 2017-03-27 NOTE — PROGRESS NOTE ADULT - PROBLEM SELECTOR PLAN 6
FS stable this morning was 130     HBA1C:8.9   -will c/w lantus 10units qhs   -meal time fingersticks running 150-250 will continue with monitoring and keeping range 150-200 given prior hypoglcemic episode  -steroids to be tapered and will help with hyperglycemia HBA1C:8.9   - this am and throughout the day the range increasing from 250-300      -Endocrinology f/u noted and appreciated d/w Dr. Dukes and will c/w same lantus dosage but increase humalog pre meals to 6 units.   -will c/w lantus 10units qhs   -c/w humalog 6units premeal and HSS   -steroids continue to be tapered and will help with hyperglycemia

## 2017-03-27 NOTE — PROGRESS NOTE ADULT - SUBJECTIVE AND OBJECTIVE BOX
Cardiology NP    S/P PEA arrest X2.  IV dye allergy, pre med given.  Mucomyst given for kidney protection.   ASA 3  Mallampati 3

## 2017-03-27 NOTE — PROGRESS NOTE ADULT - PROBLEM SELECTOR PLAN 2
CRF with acute renal failure- prerenal   -remains at creatine of ~2.2   -Yesterday decreased lasix to 40mg IV BID and will ask nephrology for further recommendations given patients BP is unable to tolerate 80mg bid  -Nephrology follow up requested

## 2017-03-27 NOTE — PROGRESS NOTE ADULT - PROBLEM SELECTOR PLAN 1
S/P cardiac arrest with successful resuscitation 3/17/2017, hx of CAD and chronic diastolic HF now with EF: 30-35%  -Cardiology follow up noted and appreciated. D/w patient and her son Kristian and they would like to proceed further with cardiac catheterization knowing the risks involved with progressively worsening renal function which includes possibly being on dialysis. D/w cardiologist Dr. Mccartney who will speak to the interventional team in regards to the further plan of care.   -Renal function remains the same   -c/w coreg 6.25 po q12hrs   -c/w aspirin 81mg po qd  -c/w lipitor 20mg po qhs  -c/w lasix 40mg IV bid

## 2017-03-27 NOTE — PROGRESS NOTE ADULT - SUBJECTIVE AND OBJECTIVE BOX
Patient was brought to cath lab for left heart cath.  This was deferred because of her inability to lay flat.  Radial approach is not possible because of her short stature and small wrists.      Right heart cath performed showing markedly elevated filling pressures.    PCWP 30mmHg  PA 73/32, mean 45 (consistent with mod-severe pulm HTN)  RV 73/19  CVP 19    Cardiac Output 4.3LPM; Cardiac Index 2.4    -Her pulmonary HTN is driven by elevated left heart pressures, but there is a pulmonary component as well  -She needs more diuresis. Inotropes can be considered if renal function is worsening with diuresis  - Left heart cath will be performed when she is more stable  - See cath report for more detail    Kavon Dillard MD

## 2017-03-27 NOTE — PROGRESS NOTE ADULT - PROBLEM SELECTOR PLAN 5
PAF pt has few episodes of elevated HR at times  -c/w coreg 6.25mg po q12hrs - bp is running low so morning dose was held  -cardiology f/u noted and appreciated will d/w patient and son long term anticoagulation, if agreeable will start full anticoagulation. Agree that pt may be high risks for falls and may not be a good candidate for anticoagulation. PAF   -c/w coreg 6.25mg po q12hrs   -cardiology f/u noted and appreciated and d/w patient and son in regards to long term anticoagulation, they are agreeable and understand the risks/benefits involved in starting full dose anticoagulation.  -c/w lovenox 70mg SQ QD renally adjusted and starting coumadin 2.5mg po qhs   -f/u INR in the am

## 2017-03-28 NOTE — PROGRESS NOTE ADULT - PROBLEM SELECTOR PLAN 2
CRF with acute renal failure   -remains at creatine of ~2.5   -will c/w monitoring bun/cr  -complete mucomyst doses   -Medications for fluid overload as states above   -nephrology follow up noted and appreciated

## 2017-03-28 NOTE — PROGRESS NOTE ADULT - SUBJECTIVE AND OBJECTIVE BOX
INTERVAL HISTORY: s/p cardiac cath. Did not tolerate left heart catherization as she was unable to lie down flat on cath table.  	  MEDICATIONS:  carvedilol 6.25milliGRAM(s) Oral every 12 hours  metoprolol Injectable 5milliGRAM(s) IV Push every 15 minutes PRN  furosemide   Injectable 40milliGRAM(s) IV Push every 12 hours  ALBUTerol/ipratropium for Nebulization 3milliLiter(s) Nebulizer every 6 hours  ALBUTerol    0.083% 2.5milliGRAM(s) Nebulizer every 3 hours PRN  guaiFENesin    Syrup 100milliGRAM(s) Oral every 4 hours  acetaminophen   Tablet. 650milliGRAM(s) Oral every 6 hours PRN  pantoprazole    Tablet 40milliGRAM(s) Oral before breakfast  famotidine    Tablet 20milliGRAM(s) Oral at bedtime  docusate sodium 100milliGRAM(s) Oral three times a day  senna 2Tablet(s) Oral at bedtime  aluminum hydroxide/magnesium hydroxide/simethicone Suspension 30milliLiter(s) Oral every 6 hours PRN  dextrose Gel 1Dose(s) Oral once PRN  dextrose 50% Injectable 12.5Gram(s) IV Push once  dextrose 50% Injectable 25Gram(s) IV Push once  dextrose 50% Injectable 25Gram(s) IV Push once  glucagon  Injectable 1milliGRAM(s) IntraMuscular once PRN  atorvastatin 20milliGRAM(s) Oral at bedtime  insulin lispro (HumaLOG) corrective regimen sliding scale  SubCutaneous Before meals and at bedtime  insulin glargine Injectable (LANTUS) 10Unit(s) SubCutaneous at bedtime  predniSONE   Tablet 20milliGRAM(s) Oral daily  insulin lispro Injectable (HumaLOG) 6Unit(s) SubCutaneous three times a day before meals  aspirin  chewable 81milliGRAM(s) Oral daily  lidocaine   Patch 1Patch Transdermal daily  dextrose 5%. 1000milliLiter(s) IV Continuous <Continuous>  enoxaparin Injectable 70milliGRAM(s) SubCutaneous daily        PHYSICAL EXAM:  T(C): 36.9, Max: 37.9 (03-27 @ 22:05)  HR: 91 (88 - 100)  BP: 124/54 (110/50 - 182/102)  RR: 20 (16 - 29)  SpO2: 100% (91% - 100%)  Wt(kg): --  I&O's Summary    I & Os for current day (as of 28 Mar 2017 09:16)  =============================================  IN: 480 ml / OUT: 1600 ml / NET: -1120 ml      Weight (kg): 74.6 (03-27 @ 10:53)    Appearance: Normal	  Cardiovascular: Normal S1 S2, No JVD, No murmurs, No edema  Respiratory: Lungs clear to auscultation	  Gastrointestinal:  Soft, Non-tender, + BS	  Skin: No rashes, No ecchymoses, No cyanosis  Extremities: Normal range of motion, No clubbing, cyanosis or edema, cath site clean, no bleeding  Vascular: Peripheral pulses palpable 2+ bilaterally    TELEMETRY: 	        LABS:	 	                       9.2    9.8   )-----------( 158      ( 28 Mar 2017 06:47 )             28.5     28 Mar 2017 06:47    132    |  88     |  79.0   ----------------------------<  422    4.8     |  34.0   |  2.55     Ca    8.8        28 Mar 2017 06:47  Phos  3.9       28 Mar 2017 06:47  Mg     1.8       28 Mar 2017 06:47      ASSESSMENT/PLAN: 	SOCORRO JARVIS is a 76y Female with past medical history significant for DM, HTN, CRF, s/p cardiac arrest, decr EF, s/p PAF   - Ideally pt needs to be on full AC would transition to renal dose Lovenox and eventual oral AC however pt reports hx fall (via translation) May not be a good long term a/c candidate  - s/p right heart catherzation, high pulmonary pressure, 73/32, wedge pressure of 30.  Needs further diuresis  - Cont current meds  - On BB, limited ability to increase further due to low BP  - Eventual left heart catherization when pt stable to lie down flat on catherization table.

## 2017-03-28 NOTE — PROGRESS NOTE ADULT - PROBLEM SELECTOR PLAN 5
PAF   -c/w coreg 6.25mg po q12hrs   -cardiology f/u noted and appreciated   -c/w lovenox 70mg SQ QD renally adjusted and c/w coumadin 2.5mg po qhs   -f/u INR in the am currently subtherapeutic

## 2017-03-28 NOTE — PROGRESS NOTE ADULT - PROBLEM SELECTOR PLAN 6
HBA1C:8.9   -Endocrinology f/u noted and appreciated d/w Dr. Dukes and will increase lantus dosage to 13 units and increase humalog pre meals to 8 units.   -will c/w lantus 13units qhs   -c/w humalog 8units premeal and HSS   -steroids continue to be tapered and will help with hyperglycemia

## 2017-03-28 NOTE — PROGRESS NOTE ADULT - PROBLEM SELECTOR PLAN 1
S/P cardiac arrest with successful resuscitation 3/17/2017, hx of CAD and chronic diastolic HF now with EF: 30-35%, s/p R cardiac cath 3/27/17 with elevated CWP   -Cardiology and nephrology follow up noted and appreciated will c/w aggressive diuresis. Lasix increased to 80mg IV BID, albumin 50ml IV q12 and zaroxolyn 2.5 mg po qd added by nephro. Agree that if current medication regimen not effective will need inotropes or HD.   -c/w coreg 6.25 po q12hrs   -c/w aspirin 81mg po qd  -c/w lipitor 20mg po qhs

## 2017-03-28 NOTE — PROGRESS NOTE ADULT - PROBLEM SELECTOR PLAN 4
s/p respiratory distress; h/o asthma   -repeat cxr shows improvement s/p abx and patients pulm edema improved  -c/w tapering steroids  -changed to prednisone 10mg po qd   -c/w bronchodilators

## 2017-03-28 NOTE — PROGRESS NOTE ADULT - SUBJECTIVE AND OBJECTIVE BOX
INTERVAL HPI/OVERNIGHT EVENTS:  Bgs getting much higher from 270 to 400. Humalog was increased to 6 u tID w meals.   Patient got 100 mg iv push last night of hydrocortisone that's why Bg very high.     MEDICATIONS  (STANDING):  dextrose 50% Injectable 12.5Gram(s) IV Push once  dextrose 50% Injectable 25Gram(s) IV Push once  dextrose 50% Injectable 25Gram(s) IV Push once  aspirin  chewable 81milliGRAM(s) Oral daily  atorvastatin 20milliGRAM(s) Oral at bedtime  insulin lispro (HumaLOG) corrective regimen sliding scale  SubCutaneous Before meals and at bedtime  ALBUTerol/ipratropium for Nebulization 3milliLiter(s) Nebulizer every 6 hours  lidocaine   Patch 1Patch Transdermal daily  pantoprazole    Tablet 40milliGRAM(s) Oral before breakfast  famotidine    Tablet 20milliGRAM(s) Oral at bedtime  docusate sodium 100milliGRAM(s) Oral three times a day  senna 2Tablet(s) Oral at bedtime  carvedilol 6.25milliGRAM(s) Oral every 12 hours  dextrose 5%. 1000milliLiter(s) IV Continuous <Continuous>  guaiFENesin    Syrup 100milliGRAM(s) Oral every 4 hours  insulin glargine Injectable (LANTUS) 10Unit(s) SubCutaneous at bedtime  furosemide   Injectable 40milliGRAM(s) IV Push every 12 hours  enoxaparin Injectable 70milliGRAM(s) SubCutaneous daily  predniSONE   Tablet 20milliGRAM(s) Oral daily  insulin lispro Injectable (HumaLOG) 6Unit(s) SubCutaneous three times a day before meals  acetylcysteine  Oral Solution 1200milliGRAM(s) Oral two times a day    MEDICATIONS  (PRN):  dextrose Gel 1Dose(s) Oral once PRN Blood Glucose LESS THAN 70 milliGRAM(s)/deciLiter  glucagon  Injectable 1milliGRAM(s) IntraMuscular once PRN Glucose <70 milliGRAM(s)/deciLiter  ALBUTerol    0.083% 2.5milliGRAM(s) Nebulizer every 3 hours PRN Shortness of Breath and/or Wheezing  acetaminophen   Tablet. 650milliGRAM(s) Oral every 6 hours PRN Moderate Pain (4 - 6)  aluminum hydroxide/magnesium hydroxide/simethicone Suspension 30milliLiter(s) Oral every 6 hours PRN Dyspepsia  metoprolol Injectable 5milliGRAM(s) IV Push every 15 minutes PRN for HR greater than 120 sustained a-fib    Allergies  IV Contrast (Unknown)    Review of systems:  no SOb, no CP, feels tired, eating better and better appetite, no diarrhea, no dizziness.     Vital Signs Last 24 Hrs  T(C): 36.9, Max: 37.9 ( @ 22:05)  T(F): 98.5, Max: 100.2 ( @ 22:05)  HR: 91 (88 - 100)  BP: 124/54 (110/50 - 182/102)  BP(mean): --  RR: 20 (16 - 29)  SpO2: 100% (91% - 100%)    PHYSICAL EXAM:  Constitutional: NAD, well-groomed, well-developed  HEENT: PERRLA, EOMI, no exophalmos  Neck: No LAD, No JVD, trachea midline, no thyroid enlargement  Back: Normal spine flexure, No CVA tenderness  Respiratory: CTAB  Cardiovascular: S1 and S2, RRR, no M/G/R  Gastrointestinal: BS+, soft, no organomeglag or mass  Extremities: No peripheral edema, no pedal lesions  Vascular: 2+ peripheral pulses  Neurological: A/O x 3, no focal deficits  Psychiatric: Normal mood, normal affect  Musculoskeletal: 5/5 strength b/l upper and lower extremities  Skin: No rashes, no acanthosis      LABS:                        9.2    9.8   )-----------( 158      ( 28 Mar 2017 06:47 )             28.5     28 Mar 2017 06:47    132    |  88     |  79.0   ----------------------------<  422    4.8     |  34.0   |  2.55     Ca    8.8        28 Mar 2017 06:47  Phos  3.9       28 Mar 2017 06:47  Mg     1.8       28 Mar 2017 06:47      Urinalysis Basic - ( 28 Mar 2017 03:40 )    Color: Yellow / Appearance: Clear / S.010 / pH: x  Gluc: x / Ketone: Negative  / Bili: Negative / Urobili: Negative mg/dL   Blood: x / Protein: Negative mg/dL / Nitrite: Negative   Leuk Esterase: Negative / RBC: x / WBC x   Sq Epi: x / Non Sq Epi: x / Bacteria: x    CAPILLARY BLOOD GLUCOSE  407 (28 Mar 2017 08:00)  360 (27 Mar 2017 22:05)  364 (27 Mar 2017 21:00)  311 (27 Mar 2017 10:53)  273 (27 Mar 2017 08:00)  305 (26 Mar 2017 22:17)  302 (26 Mar 2017 18:00)  221 (26 Mar 2017 11:38)  130 (26 Mar 2017 08:00)  335 (25 Mar 2017 22:59)  241 (25 Mar 2017 18:00)  147 (25 Mar 2017 11:00)  69 (25 Mar 2017 09:00)  362 (24 Mar 2017 21:57)  224 (24 Mar 2017 12:27)      RADIOLOGY & ADDITIONAL TESTS: INTERVAL HPI/OVERNIGHT EVENTS:  Bgs getting much higher from 270 to 400. Humalog was increased to 6 u tID w meals.   Patient got 100 mg iv push last night of hydrocortisone that's why Bg very high.     MEDICATIONS  (STANDING):  dextrose 50% Injectable 12.5Gram(s) IV Push once  dextrose 50% Injectable 25Gram(s) IV Push once  dextrose 50% Injectable 25Gram(s) IV Push once  aspirin  chewable 81milliGRAM(s) Oral daily  atorvastatin 20milliGRAM(s) Oral at bedtime  insulin lispro (HumaLOG) corrective regimen sliding scale  SubCutaneous Before meals and at bedtime  ALBUTerol/ipratropium for Nebulization 3milliLiter(s) Nebulizer every 6 hours  lidocaine   Patch 1Patch Transdermal daily  pantoprazole    Tablet 40milliGRAM(s) Oral before breakfast  famotidine    Tablet 20milliGRAM(s) Oral at bedtime  docusate sodium 100milliGRAM(s) Oral three times a day  senna 2Tablet(s) Oral at bedtime  carvedilol 6.25milliGRAM(s) Oral every 12 hours  dextrose 5%. 1000milliLiter(s) IV Continuous <Continuous>  guaiFENesin    Syrup 100milliGRAM(s) Oral every 4 hours  insulin glargine Injectable (LANTUS) 10Unit(s) SubCutaneous at bedtime  furosemide   Injectable 40milliGRAM(s) IV Push every 12 hours  enoxaparin Injectable 70milliGRAM(s) SubCutaneous daily  predniSONE   Tablet 20milliGRAM(s) Oral daily  insulin lispro Injectable (HumaLOG) 6Unit(s) SubCutaneous three times a day before meals  acetylcysteine  Oral Solution 1200milliGRAM(s) Oral two times a day    MEDICATIONS  (PRN):  dextrose Gel 1Dose(s) Oral once PRN Blood Glucose LESS THAN 70 milliGRAM(s)/deciLiter  glucagon  Injectable 1milliGRAM(s) IntraMuscular once PRN Glucose <70 milliGRAM(s)/deciLiter  ALBUTerol    0.083% 2.5milliGRAM(s) Nebulizer every 3 hours PRN Shortness of Breath and/or Wheezing  acetaminophen   Tablet. 650milliGRAM(s) Oral every 6 hours PRN Moderate Pain (4 - 6)  aluminum hydroxide/magnesium hydroxide/simethicone Suspension 30milliLiter(s) Oral every 6 hours PRN Dyspepsia  metoprolol Injectable 5milliGRAM(s) IV Push every 15 minutes PRN for HR greater than 120 sustained a-fib    Allergies  IV Contrast (Unknown)    Review of systems:  no SOb, no CP, feels tired, eating better and better appetite, no diarrhea, no dizziness.     Vital Signs Last 24 Hrs  T(C): 36.9, Max: 37.9 ( @ 22:05)  T(F): 98.5, Max: 100.2 ( @ 22:05)  HR: 91 (88 - 100)  BP: 124/54 (110/50 - 182/102)  BP(mean): --  RR: 20 (16 - 29)  SpO2: 100% (91% - 100%)    PHYSICAL EXAM:  Constitutional: NAD, well-groomed, well-developed  HEENT: PERRLA, EOMI, no exophalmos  Neck: No LAD, No JVD, trachea midline, no thyroid enlargement  Back: Normal spine flexure, No CVA tenderness  Respiratory: CTAB  Cardiovascular: S1 and S2, RRR, no M/G/R  Gastrointestinal: BS+, soft, no organomeglag or mass  Extremities: Pitting edema 1+ , no pedal lesions  Vascular: 2+ peripheral pulses  Neurological: A/O x 3, no focal deficits  Psychiatric: Normal mood, normal affect  Musculoskeletal: 5/5 strength b/l upper and lower extremities  Skin: No rashes, no acanthosis      LABS:                        9.2    9.8   )-----------( 158      ( 28 Mar 2017 06:47 )             28.5     28 Mar 2017 06:47    132    |  88     |  79.0   ----------------------------<  422    4.8     |  34.0   |  2.55     Ca    8.8        28 Mar 2017 06:47  Phos  3.9       28 Mar 2017 06:47  Mg     1.8       28 Mar 2017 06:47      Urinalysis Basic - ( 28 Mar 2017 03:40 )    Color: Yellow / Appearance: Clear / S.010 / pH: x  Gluc: x / Ketone: Negative  / Bili: Negative / Urobili: Negative mg/dL   Blood: x / Protein: Negative mg/dL / Nitrite: Negative   Leuk Esterase: Negative / RBC: x / WBC x   Sq Epi: x / Non Sq Epi: x / Bacteria: x    CAPILLARY BLOOD GLUCOSE  407 (28 Mar 2017 08:00)  360 (27 Mar 2017 22:05)  364 (27 Mar 2017 21:00)  311 (27 Mar 2017 10:53)  273 (27 Mar 2017 08:00)  305 (26 Mar 2017 22:17)  302 (26 Mar 2017 18:00)  221 (26 Mar 2017 11:38)  130 (26 Mar 2017 08:00)  335 (25 Mar 2017 22:59)  241 (25 Mar 2017 18:00)  147 (25 Mar 2017 11:00)  69 (25 Mar 2017 09:00)  362 (24 Mar 2017 21:57)  224 (24 Mar 2017 12:27)      RADIOLOGY & ADDITIONAL TESTS:

## 2017-03-28 NOTE — PROGRESS NOTE ADULT - SUBJECTIVE AND OBJECTIVE BOX
Patient is a 76y old  Female who presents with a chief complaint of Unresponsive/Cardiac Arrest (17 Mar 2017 11:27)      HEALTH ISSUES - PROBLEM Dx:  Asthma: Asthma  Sternal pain: Sternal pain  Acute systolic heart failure: Acute systolic heart failure  Atrial fibrillation: Atrial fibrillation  Hypertension: Hypertension  Diabetes: Diabetes  Acute on chronic kidney failure: Acute on chronic kidney failure  Type 2 diabetes mellitus with nephropathy: Type 2 diabetes mellitus with nephropathy  Constipation, unspecified constipation type: Constipation, unspecified constipation type  Encounter for palliative care: Encounter for palliative care  Pain: Pain  Chronic congestive heart failure, unspecified congestive heart failure type: Chronic congestive heart failure, unspecified congestive heart failure type  Cough: Cough  Abnormal chest x-ray: Abnormal chest x-ray  Prophylactic measure: Prophylactic measure  Hypercarbia: Hypercarbia  Hypoxia: Hypoxia  Congestive heart failure: Congestive heart failure  Diabetes mellitus of other type with oral complication, without long-term current use of insulin: Diabetes mellitus of other type with oral complication, without long-term current use of insulin  Kidney disease: Kidney disease  Cardiopulmonary arrest with successful resuscitation: Cardiopulmonary arrest with successful resuscitation  Hypoglycemia: Hypoglycemia  SENTHIL (acute kidney injury): SENTHIL (acute kidney injury)  Respiratory distress: Respiratory distress  Cardiac arrest: Cardiac arrest    INTERVAL HPI/OVERNIGHT EVENTS:  Patient seen and examined at bedside. No acute events overnight. Patient s/p R heart cardiac cath yesterday night, heft heart cath was unable to be performed b/c of patients inability to lay flat. Patient states she is feeling well, continues to have mild sternal pain of which she has had since admission 2/10.     Vital Signs Last 24 Hrs  T(C): 36.9, Max: 37.9 ( @ 22:05)  T(F): 98.5, Max: 100.2 ( @ 22:05)  HR: 91 (88 - 100)  BP: 124/54 (110/50 - 182/102)  BP(mean): --  RR: 20 (16 - 29)  SpO2: 100% (96% - 100%)    CAPILLARY BLOOD GLUCOSE  401 (28 Mar 2017 11:00)  407 (28 Mar 2017 08:00)  360 (27 Mar 2017 22:05)  364 (27 Mar 2017 21:00)      I&O's Summary  I & Os for 24h ending 28 Mar 2017 07:00  =============================================  IN: 480 ml / OUT: 1600 ml / NET: -1120 ml    I & Os for current day (as of 28 Mar 2017 16:10)  =============================================  IN: 480 ml / OUT: 500 ml / NET: -20 ml      CONSTITUTIONAL: Well appearing, well nourished, awake, alert and in no apparent distress  CARDIAC: Normal rate, regular rhythm.  Heart sounds S1, S2.  No murmurs, rubs or gallops, pain on palpation to sternum and left chest   RESPIRATORY: Decreased BS bilaterally. Minimal exp wheezing   ABDOMEN: Soft, NT ND BS+  EXTREMITIES: +1 b/l peripheral edema, cyanosis or deformity   SKIN: No rash, chronic skin changes     MEDICATIONS  (STANDING):  dextrose 50% Injectable 12.5Gram(s) IV Push once  dextrose 50% Injectable 25Gram(s) IV Push once  dextrose 50% Injectable 25Gram(s) IV Push once  aspirin  chewable 81milliGRAM(s) Oral daily  atorvastatin 20milliGRAM(s) Oral at bedtime  insulin lispro (HumaLOG) corrective regimen sliding scale  SubCutaneous Before meals and at bedtime  ALBUTerol/ipratropium for Nebulization 3milliLiter(s) Nebulizer every 6 hours  lidocaine   Patch 1Patch Transdermal daily  pantoprazole    Tablet 40milliGRAM(s) Oral before breakfast  famotidine    Tablet 20milliGRAM(s) Oral at bedtime  docusate sodium 100milliGRAM(s) Oral three times a day  senna 2Tablet(s) Oral at bedtime  carvedilol 6.25milliGRAM(s) Oral every 12 hours  dextrose 5%. 1000milliLiter(s) IV Continuous <Continuous>  guaiFENesin    Syrup 100milliGRAM(s) Oral every 4 hours  enoxaparin Injectable 70milliGRAM(s) SubCutaneous daily  predniSONE   Tablet 20milliGRAM(s) Oral daily  insulin lispro Injectable (HumaLOG) 6Unit(s) SubCutaneous three times a day before meals  acetylcysteine  Oral Solution 1200milliGRAM(s) Oral two times a day  albumin human 25% IVPB 50milliLiter(s) IV Intermittent every 12 hours  furosemide   Injectable 80milliGRAM(s) IV Push two times a day  metolazone 5milliGRAM(s) Oral daily  insulin glargine Injectable (LANTUS) 13Unit(s) SubCutaneous at bedtime    MEDICATIONS  (PRN):  dextrose Gel 1Dose(s) Oral once PRN Blood Glucose LESS THAN 70 milliGRAM(s)/deciLiter  glucagon  Injectable 1milliGRAM(s) IntraMuscular once PRN Glucose <70 milliGRAM(s)/deciLiter  ALBUTerol    0.083% 2.5milliGRAM(s) Nebulizer every 3 hours PRN Shortness of Breath and/or Wheezing  acetaminophen   Tablet. 650milliGRAM(s) Oral every 6 hours PRN Moderate Pain (4 - 6)  aluminum hydroxide/magnesium hydroxide/simethicone Suspension 30milliLiter(s) Oral every 6 hours PRN Dyspepsia  metoprolol Injectable 5milliGRAM(s) IV Push every 15 minutes PRN for HR greater than 120 sustained a-fib      LABS:                        9.2    9.8   )-----------( 158      ( 28 Mar 2017 06:47 )             28.5     28 Mar 2017 06:47    132    |  88     |  79.0   ----------------------------<  422    4.8     |  34.0   |  2.55     Ca    8.8        28 Mar 2017 06:47  Phos  3.9       28 Mar 2017 06:47  Mg     1.8       28 Mar 2017 06:47      PT/INR - ( 28 Mar 2017 06:47 )   PT: 11.1 sec;   INR: 1.01 ratio           Urinalysis Basic - ( 28 Mar 2017 03:40 )    Color: Yellow / Appearance: Clear / S.010 / pH: x  Gluc: x / Ketone: Negative  / Bili: Negative / Urobili: Negative mg/dL   Blood: x / Protein: Negative mg/dL / Nitrite: Negative   Leuk Esterase: Negative / RBC: x / WBC x   Sq Epi: x / Non Sq Epi: x / Bacteria: x          RADIOLOGY & ADDITIONAL TESTS: Patient is a 76y old  Female who presents with a chief complaint of Unresponsive/Cardiac Arrest (17 Mar 2017 11:27)      HEALTH ISSUES - PROBLEM Dx:  Asthma: Asthma  Sternal pain: Sternal pain  Acute systolic heart failure: Acute systolic heart failure  Atrial fibrillation: Atrial fibrillation  Hypertension: Hypertension  Diabetes: Diabetes  Acute on chronic kidney failure: Acute on chronic kidney failure  Type 2 diabetes mellitus with nephropathy: Type 2 diabetes mellitus with nephropathy  Constipation, unspecified constipation type: Constipation, unspecified constipation type  Encounter for palliative care: Encounter for palliative care  Pain: Pain  Chronic congestive heart failure, unspecified congestive heart failure type: Chronic congestive heart failure, unspecified congestive heart failure type  Cough: Cough  Abnormal chest x-ray: Abnormal chest x-ray  Prophylactic measure: Prophylactic measure  Hypercarbia: Hypercarbia  Hypoxia: Hypoxia  Congestive heart failure: Congestive heart failure  Diabetes mellitus of other type with oral complication, without long-term current use of insulin: Diabetes mellitus of other type with oral complication, without long-term current use of insulin  Kidney disease: Kidney disease  Cardiopulmonary arrest with successful resuscitation: Cardiopulmonary arrest with successful resuscitation  Hypoglycemia: Hypoglycemia  SENTHIL (acute kidney injury): SENTHIL (acute kidney injury)  Respiratory distress: Respiratory distress  Cardiac arrest: Cardiac arrest    INTERVAL HPI/OVERNIGHT EVENTS:  Patient seen and examined at bedside. No acute events overnight. Patient s/p R heart cardiac cath yesterday night, left heart cath was unable to be performed b/c of patients inability to lay flat. Patient states she is feeling well, continues to have mild sternal pain of which she has had since admission 2/10. Patient denies SOB, abd pain, N/V, fever, chills, dysuria or any other complaints. All remainder ROS negative.     Vital Signs Last 24 Hrs  T(C): 36.9, Max: 37.9 ( @ 22:05)  T(F): 98.5, Max: 100.2 ( @ 22:05)  HR: 91 (88 - 100)  BP: 124/54 (110/50 - 182/102)  BP(mean): --  RR: 20 (16 - 29)  SpO2: 100% (96% - 100%)    CAPILLARY BLOOD GLUCOSE  401 (28 Mar 2017 11:00)  407 (28 Mar 2017 08:00)  360 (27 Mar 2017 22:05)  364 (27 Mar 2017 21:00)      I&O's Summary  I & Os for 24h ending 28 Mar 2017 07:00  =============================================  IN: 480 ml / OUT: 1600 ml / NET: -1120 ml    I & Os for current day (as of 28 Mar 2017 16:10)  =============================================  IN: 480 ml / OUT: 500 ml / NET: -20 ml      CONSTITUTIONAL: Well appearing, well nourished, awake, alert and in no apparent distress  CARDIAC: Normal rate, regular rhythm.  Heart sounds S1, S2.  No murmurs, rubs or gallops, mild pain on palpation to sternum and left chest   RESPIRATORY: Decreased BS bilaterally. Minimal exp wheezing   ABDOMEN: Soft, NT ND BS+  EXTREMITIES: +1 b/l peripheral edema, cyanosis or deformity   SKIN: No rash, chronic skin changes     MEDICATIONS  (STANDING):  dextrose 50% Injectable 12.5Gram(s) IV Push once  dextrose 50% Injectable 25Gram(s) IV Push once  dextrose 50% Injectable 25Gram(s) IV Push once  aspirin  chewable 81milliGRAM(s) Oral daily  atorvastatin 20milliGRAM(s) Oral at bedtime  insulin lispro (HumaLOG) corrective regimen sliding scale  SubCutaneous Before meals and at bedtime  ALBUTerol/ipratropium for Nebulization 3milliLiter(s) Nebulizer every 6 hours  lidocaine   Patch 1Patch Transdermal daily  pantoprazole    Tablet 40milliGRAM(s) Oral before breakfast  famotidine    Tablet 20milliGRAM(s) Oral at bedtime  docusate sodium 100milliGRAM(s) Oral three times a day  senna 2Tablet(s) Oral at bedtime  carvedilol 6.25milliGRAM(s) Oral every 12 hours  dextrose 5%. 1000milliLiter(s) IV Continuous <Continuous>  guaiFENesin    Syrup 100milliGRAM(s) Oral every 4 hours  enoxaparin Injectable 70milliGRAM(s) SubCutaneous daily  predniSONE   Tablet 20milliGRAM(s) Oral daily  insulin lispro Injectable (HumaLOG) 6Unit(s) SubCutaneous three times a day before meals  acetylcysteine  Oral Solution 1200milliGRAM(s) Oral two times a day  albumin human 25% IVPB 50milliLiter(s) IV Intermittent every 12 hours  furosemide   Injectable 80milliGRAM(s) IV Push two times a day  metolazone 5milliGRAM(s) Oral daily  insulin glargine Injectable (LANTUS) 13Unit(s) SubCutaneous at bedtime    MEDICATIONS  (PRN):  dextrose Gel 1Dose(s) Oral once PRN Blood Glucose LESS THAN 70 milliGRAM(s)/deciLiter  glucagon  Injectable 1milliGRAM(s) IntraMuscular once PRN Glucose <70 milliGRAM(s)/deciLiter  ALBUTerol    0.083% 2.5milliGRAM(s) Nebulizer every 3 hours PRN Shortness of Breath and/or Wheezing  acetaminophen   Tablet. 650milliGRAM(s) Oral every 6 hours PRN Moderate Pain (4 - 6)  aluminum hydroxide/magnesium hydroxide/simethicone Suspension 30milliLiter(s) Oral every 6 hours PRN Dyspepsia  metoprolol Injectable 5milliGRAM(s) IV Push every 15 minutes PRN for HR greater than 120 sustained a-fib      LABS:                        9.2    9.8   )-----------( 158      ( 28 Mar 2017 06:47 )             28.5     28 Mar 2017 06:47    132    |  88     |  79.0   ----------------------------<  422    4.8     |  34.0   |  2.55     Ca    8.8        28 Mar 2017 06:47  Phos  3.9       28 Mar 2017 06:47  Mg     1.8       28 Mar 2017 06:47      PT/INR - ( 28 Mar 2017 06:47 )   PT: 11.1 sec;   INR: 1.01 ratio           Urinalysis Basic - ( 28 Mar 2017 03:40 )    Color: Yellow / Appearance: Clear / S.010 / pH: x  Gluc: x / Ketone: Negative  / Bili: Negative / Urobili: Negative mg/dL   Blood: x / Protein: Negative mg/dL / Nitrite: Negative   Leuk Esterase: Negative / RBC: x / WBC x   Sq Epi: x / Non Sq Epi: x / Bacteria: x          RADIOLOGY & ADDITIONAL TESTS:  INTERVENTIONAL IMPRESSIONS: - Marked elevation of filling pressures  - PCWP = 30mmHg  - Moderate-severe pulmonary HTN (mostly driven by left heart pressures, but   a primary pulmonary component exists as well)  - Cardiac output 4.3LPM; cardiac index 2.4

## 2017-03-28 NOTE — PROGRESS NOTE ADULT - SUBJECTIVE AND OBJECTIVE BOX
NEPHROLOGY INTERVAL HPI/OVERNIGHT EVENTS: Right heart cath last pm noted.    MEDICATIONS  (STANDING):  dextrose 50% Injectable 12.5Gram(s) IV Push once  dextrose 50% Injectable 25Gram(s) IV Push once  dextrose 50% Injectable 25Gram(s) IV Push once  aspirin  chewable 81milliGRAM(s) Oral daily  atorvastatin 20milliGRAM(s) Oral at bedtime  insulin lispro (HumaLOG) corrective regimen sliding scale  SubCutaneous Before meals and at bedtime  ALBUTerol/ipratropium for Nebulization 3milliLiter(s) Nebulizer every 6 hours  lidocaine   Patch 1Patch Transdermal daily  pantoprazole    Tablet 40milliGRAM(s) Oral before breakfast  famotidine    Tablet 20milliGRAM(s) Oral at bedtime  docusate sodium 100milliGRAM(s) Oral three times a day  senna 2Tablet(s) Oral at bedtime  carvedilol 6.25milliGRAM(s) Oral every 12 hours  dextrose 5%. 1000milliLiter(s) IV Continuous <Continuous>  guaiFENesin    Syrup 100milliGRAM(s) Oral every 4 hours  enoxaparin Injectable 70milliGRAM(s) SubCutaneous daily  predniSONE   Tablet 20milliGRAM(s) Oral daily  insulin lispro Injectable (HumaLOG) 6Unit(s) SubCutaneous three times a day before meals  acetylcysteine  Oral Solution 1200milliGRAM(s) Oral two times a day  albumin human 25% IVPB 50milliLiter(s) IV Intermittent every 12 hours  furosemide   Injectable 80milliGRAM(s) IV Push two times a day  metolazone 5milliGRAM(s) Oral daily  insulin glargine Injectable (LANTUS) 13Unit(s) SubCutaneous at bedtime    MEDICATIONS  (PRN):  dextrose Gel 1Dose(s) Oral once PRN Blood Glucose LESS THAN 70 milliGRAM(s)/deciLiter  glucagon  Injectable 1milliGRAM(s) IntraMuscular once PRN Glucose <70 milliGRAM(s)/deciLiter  ALBUTerol    0.083% 2.5milliGRAM(s) Nebulizer every 3 hours PRN Shortness of Breath and/or Wheezing  acetaminophen   Tablet. 650milliGRAM(s) Oral every 6 hours PRN Moderate Pain (4 - 6)  aluminum hydroxide/magnesium hydroxide/simethicone Suspension 30milliLiter(s) Oral every 6 hours PRN Dyspepsia  metoprolol Injectable 5milliGRAM(s) IV Push every 15 minutes PRN for HR greater than 120 sustained a-fib      Allergies    IV Contrast (Unknown)    Intolerances        Vital Signs Last 24 Hrs  T(C): 36.9, Max: 37.9 ( @ 22:05)  T(F): 98.5, Max: 100.2 ( @ 22:05)  HR: 91 (88 - 100)  BP: 124/54 (110/50 - 182/102)  BP(mean): --  RR: 20 (16 - 29)  SpO2: 100% (96% - 100%)    PHYSICAL EXAM:    GENERAL: appears chronically ill  HEAD:    EYES: same  ENMT:   NECK: veins full but collapse with inspiration  NERVOUS SYSTEM:  awake, alert  CHEST/LUNG: nasal 02, decreased bs bases  HEART: no rub  ABDOMEN: not tender  EXTREMITIES:  pitting leg edema  LYMPH:   SKIN: no rash   no pain  LABS:                     28 Mar 2017 06:47    132    |  88     |  79.0   ----------------------------<  422    4.8     |  34.0   |  2.55     Ca    8.8        28 Mar 2017 06:47  Phos  3.9       28 Mar 2017 06:47  Mg     1.8       28 Mar 2017 06:47               Urinalysis Basic - ( 28 Mar 2017 03:40 )    Color: Yellow / Appearance: Clear / S.010 / pH: x  Gluc: x / Ketone: Negative  / Bili: Negative / Urobili: Negative mg/dL   Blood: x / Protein: Negative mg/dL / Nitrite: Negative   Leuk Esterase: Negative / RBC: x / WBC x   Sq Epi: x / Non Sq Epi: x / Bacteria: x      Magnesium, Serum: 1.8 mg/dL ( @ 06:47)  Phosphorus Level, Serum: 3.9 mg/dL ( @ 06:47)          RADIOLOGY & ADDITIONAL TESTS:

## 2017-03-28 NOTE — PROGRESS NOTE ADULT - ASSESSMENT
T2DM-   steroids being tapered but she got iv push HC last night and Bgs are very high again.   increase lantus to 13 u HS and continue with lispro to 6 u TID w meals  1800 ADA     Afib- intermittent - await cardiology input- pt still needs cath but awaiting improvement in renal function T2DM-   steroids being tapered but she got iv push HC last night and Bgs are very high again.   increase lantus to 13 u HS and increase humalog to 8 u tID for meals   continue SSI for now   1800 ADA     Afib- intermittent - await cardiology input- pt still needs cath but awaiting improvement in renal function

## 2017-03-28 NOTE — PROGRESS NOTE ADULT - ASSESSMENT
76 F admitted s/p cardiac arrest, likely secondary to hypoglycemia induced hypercapnia (acute on chronic) from stupor causing increased myocardial demand. ROSC with return of mental status, therefore no modified hypothermia, intubated 3/17 extubated 3/18, was on dobutamine infusion for cardiogenic shock. Pt was transferred to  and continues to be followed by cardiology and nephrology. Patient was optimized for cardiac catheterization and patient/son wanted to pursue cardiac catheterization knowing risks involved with further decline in renal function and possible need for dialysis. Pt s/p R heart cath 3/27/17, L heart cath unable to be performed. Pt continues to be diuresed.

## 2017-03-29 NOTE — PROGRESS NOTE ADULT - PROBLEM SELECTOR PLAN 2
ARF due to ATN and pre renal azotemia, CRF(late III-early IV)   -creatine up trending to 2.7   -will c/w monitoring bun/cr    -Medications for fluid overload as stated above   -nephrology follow up noted and appreciated

## 2017-03-29 NOTE — PROGRESS NOTE ADULT - SUBJECTIVE AND OBJECTIVE BOX
INTERVAL HISTORY:  denies CP.SOB  	  MEDICATIONS:  carvedilol 6.25milliGRAM(s) Oral every 12 hours  metoprolol Injectable 5milliGRAM(s) IV Push every 15 minutes PRN  furosemide   Injectable 80milliGRAM(s) IV Push two times a day  metolazone 5milliGRAM(s) Oral daily  ALBUTerol/ipratropium for Nebulization 3milliLiter(s) Nebulizer every 6 hours  ALBUTerol    0.083% 2.5milliGRAM(s) Nebulizer every 3 hours PRN  guaiFENesin    Syrup 100milliGRAM(s) Oral every 4 hours  acetaminophen   Tablet. 650milliGRAM(s) Oral every 6 hours PRN  oxyCODONE  5 mG/acetaminophen 325 mG 2Tablet(s) Oral every 6 hours PRN  Pantoprazole    Tablet 40milliGRAM(s) Oral before breakfast  famotidine    Tablet 20milliGRAM(s) Oral at bedtime  docusate sodium 100milliGRAM(s) Oral three times a day  senna 2Tablet(s) Oral at bedtime  aluminum hydroxide/magnesium hydroxide/simethicone Suspension 30milliLiter(s) Oral every 6 hours PRN  dextrose Gel 1Dose(s) Oral once PRN  dextrose 50% Injectable 12.5Gram(s) IV Push once  dextrose 50% Injectable 25Gram(s) IV Push once  dextrose 50% Injectable 25Gram(s) IV Push once  glucagon  Injectable 1milliGRAM(s) IntraMuscular once PRN  atorvastatin 20milliGRAM(s) Oral at bedtime  insulin lispro (HumaLOG) corrective regimen sliding scale  SubCutaneous Before meals and at bedtime  insulin glargine Injectable (LANTUS) 13Unit(s) SubCutaneous at bedtime  predniSONE   Tablet 10milliGRAM(s) Oral daily  insulin lispro Injectable (HumaLOG) 8Unit(s) SubCutaneous three times a day before meals  aspirin  chewable 81milliGRAM(s) Oral daily  lidocaine   Patch 1Patch Transdermal daily  dextrose 5%. 1000milliLiter(s) IV Continuous <Continuous>  enoxaparin Injectable 70milliGRAM(s) SubCutaneous daily  albumin human 25% IVPB 50milliLiter(s) IV Intermittent every 12 hours  warfarin 2.5milliGRAM(s) Oral <User Schedule>        PHYSICAL EXAM:  T(C): 36.6, Max: 37.1 (03-28 @ 21:42)  HR: 82 (82 - 94)  BP: 106/48 (102/46 - 163/67)  RR: 20 (18 - 20)  SpO2: 100% (94% - 100%)  Wt(kg): --  I&O's Summary    I & Os for current day (as of 29 Mar 2017 09:29)  =============================================  IN: 640 ml / OUT: 500 ml / NET: 140 ml        Appearance: Normal	  HEENT:   Normal oral mucosa, PERRL, EOMI	y  Cardiovascular: Normal S1 S2, No JVD, No murmurs, No edema  Respiratory: Lungs clear to auscultation	  Gastrointestinal:  Soft, Non-tender, + BS	  Skin: No rashes, No ecchymoses, No cyanosis  Neurologic: Non-focal  Extremities: 2+ LE edema  Vascular: Peripheral pulses palpable 2+ bilaterally      LABS:	 	    CARDIAC MARKERS:                        9.0    9.6   )-----------( 141      ( 29 Mar 2017 06:53 )             27.6     29 Mar 2017 06:53    136    |  91     |  79.0   ----------------------------<  89     4.2     |  33.0   |  2.74     Ca    9.0        29 Mar 2017 06:53  Phos  4.0       29 Mar 2017 06:53  Mg     1.8       29 Mar 2017 06:53    TPro  5.5    /  Alb  2.8    /  TBili  0.4    /  DBili  x      /  AST  29     /  ALT  33     /  AlkPhos  68     29 Mar 2017 06:53      ASSESSMENT/PLAN: 	SOCORRO JARVIS is a 76y Female with past medical history significant for DM, HTN, CRF, s/p cardiac arrest, decr EF, s/p PAF   - Ideally pt needs to be on full AC would transition to renal dose Lovenox and eventual oral AC however pt reports hx fall. May not be a good long term a/c candidate  - S/p right heart catherzation, high pulmonary pressure, 73/32, wedge pressure of 30.  Unable to perform left heart catherization 2/2 pt's inability to lie down flat on catherization table.  - Needs further diuresis, on IV Lasix  - Eventual reattempt at left heart catherization when pt stable to lie down flat on.

## 2017-03-29 NOTE — PROGRESS NOTE ADULT - SUBJECTIVE AND OBJECTIVE BOX
NEPHROLOGY INTERVAL HPI/OVERNIGHT EVENTS:  pt seated in chair  no acute distress  tolerating diet    MEDICATIONS  (STANDING):  dextrose 50% Injectable 12.5Gram(s) IV Push once  dextrose 50% Injectable 25Gram(s) IV Push once  dextrose 50% Injectable 25Gram(s) IV Push once  aspirin  chewable 81milliGRAM(s) Oral daily  atorvastatin 20milliGRAM(s) Oral at bedtime  insulin lispro (HumaLOG) corrective regimen sliding scale  SubCutaneous Before meals and at bedtime  ALBUTerol/ipratropium for Nebulization 3milliLiter(s) Nebulizer every 6 hours  lidocaine   Patch 1Patch Transdermal daily  pantoprazole    Tablet 40milliGRAM(s) Oral before breakfast  famotidine    Tablet 20milliGRAM(s) Oral at bedtime  docusate sodium 100milliGRAM(s) Oral three times a day  senna 2Tablet(s) Oral at bedtime  carvedilol 6.25milliGRAM(s) Oral every 12 hours  dextrose 5%. 1000milliLiter(s) IV Continuous <Continuous>  guaiFENesin    Syrup 100milliGRAM(s) Oral every 4 hours  enoxaparin Injectable 70milliGRAM(s) SubCutaneous daily  albumin human 25% IVPB 50milliLiter(s) IV Intermittent every 12 hours  furosemide   Injectable 80milliGRAM(s) IV Push two times a day  metolazone 5milliGRAM(s) Oral daily  insulin glargine Injectable (LANTUS) 13Unit(s) SubCutaneous at bedtime  predniSONE   Tablet 10milliGRAM(s) Oral daily  insulin lispro Injectable (HumaLOG) 8Unit(s) SubCutaneous three times a day before meals  warfarin 2.5milliGRAM(s) Oral <User Schedule>    MEDICATIONS  (PRN):  dextrose Gel 1Dose(s) Oral once PRN Blood Glucose LESS THAN 70 milliGRAM(s)/deciLiter  glucagon  Injectable 1milliGRAM(s) IntraMuscular once PRN Glucose <70 milliGRAM(s)/deciLiter  ALBUTerol    0.083% 2.5milliGRAM(s) Nebulizer every 3 hours PRN Shortness of Breath and/or Wheezing  acetaminophen   Tablet. 650milliGRAM(s) Oral every 6 hours PRN Moderate Pain (4 - 6)  aluminum hydroxide/magnesium hydroxide/simethicone Suspension 30milliLiter(s) Oral every 6 hours PRN Dyspepsia  metoprolol Injectable 5milliGRAM(s) IV Push every 15 minutes PRN for HR greater than 120 sustained a-fib  oxyCODONE  5 mG/acetaminophen 325 mG 2Tablet(s) Oral every 6 hours PRN Severe Pain (7 - 10)      Allergies    IV Contrast (Unknown)        Vital Signs Last 24 Hrs  T(C): 36.6, Max: 37.1 ( @ 21:42)  T(F): 97.8, Max: 98.8 ( @ 21:42)  HR: 82 (82 - 94)  BP: 106/48 (102/46 - 163/67)  BP(mean): --  RR: 20 (18 - 20)  SpO2: 100% (94% - 100%)    PHYSICAL EXAM:  GENERAL: NAD, generalized weakness  HEAD:  Atraumatic, Normocephalic  EYES: EOMI, PERRLA, conjunctiva and sclera clear  NECK: Supple, No JVD, Normal thyroid  NERVOUS SYSTEM:  Alert & Oriented X3,  intact and symmetric  CHEST/LUNG: Clear to percussion bilaterally; No rales, rhonchi, wheezing, or rubs  HEART: Regular rate and rhythm; No rub  ABDOMEN: Soft, Nontender, Nondistended; Bowel sounds present  EXTREMITIES:  2+ Peripheral Pulses, No clubbing, cyanosis, or edema  LYMPH: No lymphadenopathy noted  SKIN: No rashes or lesions      LABS:                        9.0    9.6   )-----------( 141      ( 29 Mar 2017 06:53 )             27.6     29 Mar 2017 06:53    136    |  91     |  79.0   ----------------------------<  89     4.2     |  33.0   |  2.74     Creatinine, Serum: 2.21 mg/dL (17 @ 07:20)        Ca    9.0        29 Mar 2017 06:53  Phos  4.0       29 Mar 2017 06:53  Mg     1.8       29 Mar 2017 06:53    TPro  5.5    /  Alb  2.8    /  TBili  0.4    /  DBili  x      /  AST  29     /  ALT  33     /  AlkPhos  68     29 Mar 2017 06:53    PT/INR - ( 29 Mar 2017 06:53 )   PT: 11.3 sec;   INR: 1.03 ratio           Urinalysis Basic - ( 28 Mar 2017 03:40 )    Color: Yellow / Appearance: Clear / S.010 / pH: x  Gluc: x / Ketone: Negative  / Bili: Negative / Urobili: Negative mg/dL   Blood: x / Protein: Negative mg/dL / Nitrite: Negative   Leuk Esterase: Negative / RBC: x / WBC x   Sq Epi: x / Non Sq Epi: x / Bacteria: x      Magnesium, Serum: 1.8 mg/dL ( @ 06:53)  Phosphorus Level, Serum: 4.0 mg/dL ( @ 06:53)      RADIOLOGY & ADDITIONAL TESTS:   EXAM:  CHEST SINGLE VIEW FRONTAL                          PROCEDURE DATE:  2017        INTERPRETATION:  CHEST AP PORTABLE:    History: Pneumonia.     Date and time of exam: 3/27/2017 10:35 PM.    Technique: A single AP view of the chest was obtained.    Comparison exam: 3/21/2017.    Findings:  The heart is enlarged. There is a left pleural effusion, unchanged. There   is an increasing right pleural effusion since the prior study. No hilar   or mediastinal abnormality..    Impression:  Cardiomegaly and left pleural effusion, unchanged. Increasing right   pleural effusion since 3/21/2017..

## 2017-03-29 NOTE — PROGRESS NOTE ADULT - ASSESSMENT
CRF(late III-early IV): s/p cardiac and respiratory arrest  ARF due to ATN and pre renal azotemia  Elevated BUN in part due to steroids  - avoid potential nephrotoxic agents  - diuretics to keep with a degree of azotemia  - daily weights and monitor labs  -  pt requires further cardiac cath==> on hold for now    Anemia: + CRF  - add JENNI; check [employer]

## 2017-03-29 NOTE — PROGRESS NOTE ADULT - PROBLEM SELECTOR PLAN 6
HBA1C:8.9 .   -will c/w lantus 13units qhs   -c/w humalog 8units premeal and HSS   -steroids continue to be tapered will d/c steroids after tomorrows dose and then hopefully see improvement in patients fs

## 2017-03-29 NOTE — PROGRESS NOTE ADULT - ASSESSMENT
76 F admitted s/p cardiac arrest, likely secondary to hypoglycemia induced hypercapnia (acute on chronic) from stupor causing increased myocardial demand. ROSC with return of mental status, therefore no modified hypothermia, intubated 3/17 extubated 3/18, was on dobutamine infusion for cardiogenic shock. Pt was transferred to  and continues to be followed by cardiology and nephrology. Patient was optimized for cardiac catheterization and patient/son wanted to pursue cardiac catheterization knowing risks involved with further decline in renal function and possible need for dialysis. CRF(late III-early IV) and ARF due to ATN and pre renal azotemia. Pt s/p R heart cath 3/27/17, L heart cath unable to be performed which showed elevated CWP. Pt continues to be diuresed.

## 2017-03-29 NOTE — PROGRESS NOTE ADULT - SUBJECTIVE AND OBJECTIVE BOX
Patient is a 76y old  Female who presents with a chief complaint of Unresponsive/Cardiac Arrest (17 Mar 2017 11:27)      HEALTH ISSUES - PROBLEM Dx:  Asthma: Asthma  Sternal pain: Sternal pain  Acute systolic heart failure: Acute systolic heart failure  Atrial fibrillation: Atrial fibrillation  Hypertension: Hypertension  Diabetes: Diabetes  Acute on chronic kidney failure: Acute on chronic kidney failure  Type 2 diabetes mellitus with nephropathy: Type 2 diabetes mellitus with nephropathy  Constipation, unspecified constipation type: Constipation, unspecified constipation type  Encounter for palliative care: Encounter for palliative care  Pain: Pain  Chronic congestive heart failure, unspecified congestive heart failure type: Chronic congestive heart failure, unspecified congestive heart failure type  Cough: Cough  Abnormal chest x-ray: Abnormal chest x-ray  Prophylactic measure: Prophylactic measure  Hypercarbia: Hypercarbia  Hypoxia: Hypoxia  Congestive heart failure: Congestive heart failure  Diabetes mellitus of other type with oral complication, without long-term current use of insulin: Diabetes mellitus of other type with oral complication, without long-term current use of insulin  Kidney disease: Kidney disease  Cardiopulmonary arrest with successful resuscitation: Cardiopulmonary arrest with successful resuscitation  Hypoglycemia: Hypoglycemia  SENTHIL (acute kidney injury): SENTHIL (acute kidney injury)  Respiratory distress: Respiratory distress  Cardiac arrest: Cardiac arrest      INTERVAL HPI/OVERNIGHT EVENTS:  Patient seen and examined at bedside. No acute events overnight. Patient states she is feeling the same today, reports urinating well. Patient denies SOB, abd pain, N/V, fever, chills, dysuria or any other complaints. All remainder ROS negative.     Vital Signs Last 24 Hrs  T(C): 37.2, Max: 37.2 ( @ 11:22)  T(F): 98.9, Max: 98.9 ( @ 11:22)  HR: 84 (82 - 94)  BP: 104/60 (102/46 - 163/67)  BP(mean): --  RR: 18 (18 - 20)  SpO2: 100% (94% - 100%)    CAPILLARY BLOOD GLUCOSE  242 (29 Mar 2017 11:35)  162 (29 Mar 2017 08:20)  292 (28 Mar 2017 21:42)  341 (28 Mar 2017 18:00)      I&O's Summary    I & Os for current day (as of 29 Mar 2017 11:35)  =============================================  IN: 640 ml / OUT: 500 ml / NET: 140 ml      CONSTITUTIONAL: Well appearing, well nourished, awake, alert and in no apparent distress  CARDIAC: Normal rate, regular rhythm.  Heart sounds S1, S2.  No murmurs, rubs or gallops, mild pain on palpation to sternum and left chest   RESPIRATORY: Decreased BS bilaterally. Minimal exp wheezing   ABDOMEN: Soft, NT ND BS+  EXTREMITIES: +1 b/l peripheral edema, cyanosis or deformity   SKIN: No rash, chronic skin changes     MEDICATIONS  (STANDING):  dextrose 50% Injectable 12.5Gram(s) IV Push once  dextrose 50% Injectable 25Gram(s) IV Push once  dextrose 50% Injectable 25Gram(s) IV Push once  aspirin  chewable 81milliGRAM(s) Oral daily  atorvastatin 20milliGRAM(s) Oral at bedtime  insulin lispro (HumaLOG) corrective regimen sliding scale  SubCutaneous Before meals and at bedtime  ALBUTerol/ipratropium for Nebulization 3milliLiter(s) Nebulizer every 6 hours  lidocaine   Patch 1Patch Transdermal daily  pantoprazole    Tablet 40milliGRAM(s) Oral before breakfast  famotidine    Tablet 20milliGRAM(s) Oral at bedtime  docusate sodium 100milliGRAM(s) Oral three times a day  senna 2Tablet(s) Oral at bedtime  carvedilol 6.25milliGRAM(s) Oral every 12 hours  dextrose 5%. 1000milliLiter(s) IV Continuous <Continuous>  guaiFENesin    Syrup 100milliGRAM(s) Oral every 4 hours  enoxaparin Injectable 70milliGRAM(s) SubCutaneous daily  albumin human 25% IVPB 50milliLiter(s) IV Intermittent every 12 hours  furosemide   Injectable 80milliGRAM(s) IV Push two times a day  metolazone 5milliGRAM(s) Oral daily  insulin glargine Injectable (LANTUS) 13Unit(s) SubCutaneous at bedtime  predniSONE   Tablet 10milliGRAM(s) Oral daily  insulin lispro Injectable (HumaLOG) 8Unit(s) SubCutaneous three times a day before meals  warfarin 2.5milliGRAM(s) Oral <User Schedule>  epoetin conrado Injectable [id number](s) SubCutaneous <User Schedule>    MEDICATIONS  (PRN):  dextrose Gel 1Dose(s) Oral once PRN Blood Glucose LESS THAN 70 milliGRAM(s)/deciLiter  glucagon  Injectable 1milliGRAM(s) IntraMuscular once PRN Glucose <70 milliGRAM(s)/deciLiter  ALBUTerol    0.083% 2.5milliGRAM(s) Nebulizer every 3 hours PRN Shortness of Breath and/or Wheezing  acetaminophen   Tablet. 650milliGRAM(s) Oral every 6 hours PRN Moderate Pain (4 - 6)  aluminum hydroxide/magnesium hydroxide/simethicone Suspension 30milliLiter(s) Oral every 6 hours PRN Dyspepsia  metoprolol Injectable 5milliGRAM(s) IV Push every 15 minutes PRN for HR greater than 120 sustained a-fib  oxyCODONE  5 mG/acetaminophen 325 mG 2Tablet(s) Oral every 6 hours PRN Severe Pain (7 - 10)      LABS:                        9.0    9.6   )-----------( 141      ( 29 Mar 2017 06:53 )             27.6     29 Mar 2017 06:53    136    |  91     |  79.0   ----------------------------<  89     4.2     |  33.0   |  2.74     Ca    9.0        29 Mar 2017 06:53  Phos  4.0       29 Mar 2017 06:53  Mg     1.8       29 Mar 2017 06:53    TPro  5.5    /  Alb  2.8    /  TBili  0.4    /  DBili  x      /  AST  29     /  ALT  33     /  AlkPhos  68     29 Mar 2017 06:53    PT/INR - ( 29 Mar 2017 06:53 )   PT: 11.3 sec;   INR: 1.03 ratio           Urinalysis Basic - ( 28 Mar 2017 03:40 )    Color: Yellow / Appearance: Clear / S.010 / pH: x  Gluc: x / Ketone: Negative  / Bili: Negative / Urobili: Negative mg/dL   Blood: x / Protein: Negative mg/dL / Nitrite: Negative   Leuk Esterase: Negative / RBC: x / WBC x   Sq Epi: x / Non Sq Epi: x / Bacteria: x      LIVER FUNCTIONS - ( 29 Mar 2017 06:53 )  Alb: 2.8 g/dL / Pro: 5.5 g/dL / ALK PHOS: 68 U/L / ALT: 33 U/L / AST: 29 U/L / GGT: x

## 2017-03-29 NOTE — PROGRESS NOTE ADULT - PROBLEM SELECTOR PLAN 1
S/P cardiac arrest with successful resuscitation 3/17/2017, hx of CAD and chronic diastolic HF now with EF: 30-35%, s/p R cardiac cath 3/27/17 with elevated CWP   -Cardiology and nephrology follow up noted and appreciated will c/w aggressive diuresis.   -c/w Lasix 80mg IV BID  -c/w albumin 50ml IV q12  -c/w zaroxolyn 2.5 mg po qd   -c/w coreg 6.25 po q12hrs   -c/w aspirin 81mg po qd  -c/w lipitor 20mg po qhs

## 2017-03-29 NOTE — PROGRESS NOTE ADULT - PROBLEM SELECTOR PLAN 1
s/p right heart cardiac cath. Continue medical management. Unable to do left heart cath as patient not able to lie flat

## 2017-03-29 NOTE — PROGRESS NOTE ADULT - SUBJECTIVE AND OBJECTIVE BOX
INTERVAL HPI/OVERNIGHT EVENTS:  states sternal pain still there but better compared from last week    PRESENT SYMPTOMS: SOURCE:  Patient/Family/Team    PAIN SCALE:  0 = none  1 = mild   2 = moderate  3 = severe    Pain: 2    Dyspnea:  [ ] YES [x ] NO  Anxiety:  [ ] YES x ] NO  Fatigue: [ ] YES [ x] NO  Nausea: [ ] YES [x ] NO  Loss of Appetite: [ ] YES [x ] NO  Other symptoms: ____+ Constipation    MEDICATIONS  (STANDING):  dextrose 50% Injectable 12.5Gram(s) IV Push once  dextrose 50% Injectable 25Gram(s) IV Push once  dextrose 50% Injectable 25Gram(s) IV Push once  aspirin  chewable 81milliGRAM(s) Oral daily  atorvastatin 20milliGRAM(s) Oral at bedtime  insulin lispro (HumaLOG) corrective regimen sliding scale  SubCutaneous Before meals and at bedtime  ALBUTerol/ipratropium for Nebulization 3milliLiter(s) Nebulizer every 6 hours  lidocaine   Patch 1Patch Transdermal daily  pantoprazole    Tablet 40milliGRAM(s) Oral before breakfast  famotidine    Tablet 20milliGRAM(s) Oral at bedtime  docusate sodium 100milliGRAM(s) Oral three times a day  senna 2Tablet(s) Oral at bedtime  carvedilol 6.25milliGRAM(s) Oral every 12 hours  dextrose 5%. 1000milliLiter(s) IV Continuous <Continuous>  guaiFENesin    Syrup 100milliGRAM(s) Oral every 4 hours  enoxaparin Injectable 70milliGRAM(s) SubCutaneous daily  albumin human 25% IVPB 50milliLiter(s) IV Intermittent every 12 hours  furosemide   Injectable 80milliGRAM(s) IV Push two times a day  metolazone 5milliGRAM(s) Oral daily  insulin glargine Injectable (LANTUS) 13Unit(s) SubCutaneous at bedtime  predniSONE   Tablet 10milliGRAM(s) Oral daily  insulin lispro Injectable (HumaLOG) 8Unit(s) SubCutaneous three times a day before meals  warfarin 2.5milliGRAM(s) Oral <User Schedule>  epoetin conrado Injectable 01710Pehg(s) SubCutaneous <User Schedule>  polyethylene glycol 3350 17Gram(s) Oral once    MEDICATIONS  (PRN):  dextrose Gel 1Dose(s) Oral once PRN Blood Glucose LESS THAN 70 milliGRAM(s)/deciLiter  glucagon  Injectable 1milliGRAM(s) IntraMuscular once PRN Glucose <70 milliGRAM(s)/deciLiter  ALBUTerol    0.083% 2.5milliGRAM(s) Nebulizer every 3 hours PRN Shortness of Breath and/or Wheezing  acetaminophen   Tablet. 650milliGRAM(s) Oral every 6 hours PRN Moderate Pain (4 - 6)  aluminum hydroxide/magnesium hydroxide/simethicone Suspension 30milliLiter(s) Oral every 6 hours PRN Dyspepsia  metoprolol Injectable 5milliGRAM(s) IV Push every 15 minutes PRN for HR greater than 120 sustained a-fib  oxyCODONE  5 mG/acetaminophen 325 mG 2Tablet(s) Oral every 6 hours PRN Severe Pain (7 - 10)      Allergies    IV Contrast (Unknown)    Intolerances         Karnofsky Performance Score/Palliative Performance Status Version 2:  40   %    Vital Signs Last 24 Hrs  T(C): 36.9, Max: 37.2 ( @ 11:22)  T(F): 98.4, Max: 98.9 ( @ 11:22)  HR: 84 (82 - 86)  BP: 104/60 (102/46 - 117/58)  BP(mean): --  RR: 18 (18 - 20)  SpO2: 100% (100% - 100%)    PHYSICAL EXAM:    General: [ x] alert  [ ] oriented x ____ [ ] lethargic [ ] agitated                  [ ] cachexia  [ ] nonverbal  [ ] coma    HEENT: x[x ] normal  [ ] dry mouth  [ ] ET tube/trach    Lungs: [ x] comfortable [ ] tachypnea/labored breathing  [ ] excessive secretions    CV: x ] normal  [ ] tachycardia    GI: [ x] normal - soft not tender nondistended  [ ] distended  [ ] tender  [ ] no BS               [ ] PEG/NG/OG tube    : [ x] normal  [ ] incontinent  [ ] oliguria/anuria  [ ] corral    MSK: [ x] normal  [ ] weakness  [ ] edema             [ ] ambulatory  [ ] bedbound/wheelchair bound    Skin: [ ] normal  [ ] pressure ulcers- Stage_____  [x ] no rash    LABS:                        9.0    9.6   )-----------( 141      ( 29 Mar 2017 06:53 )             27.6     29 Mar 2017 06:53    136    |  91     |  79.0   ----------------------------<  89     4.2     |  33.0   |  2.74     Ca    9.0        29 Mar 2017 06:53  Phos  4.0       29 Mar 2017 06:53  Mg     1.8       29 Mar 2017 06:53    TPro  5.5    /  Alb  2.8    /  TBili  0.4    /  DBili  x      /  AST  29     /  ALT  33     /  AlkPhos  68     29 Mar 2017 06:53    PT/INR - ( 29 Mar 2017 06:53 )   PT: 11.3 sec;   INR: 1.03 ratio           Urinalysis Basic - ( 28 Mar 2017 03:40 )    Color: Yellow / Appearance: Clear / S.010 / pH: x  Gluc: x / Ketone: Negative  / Bili: Negative / Urobili: Negative mg/dL   Blood: x / Protein: Negative mg/dL / Nitrite: Negative   Leuk Esterase: Negative / RBC: x / WBC x   Sq Epi: x / Non Sq Epi: x / Bacteria: x      I&O's Summary  I & Os for 24h ending 29 Mar 2017 07:00  =============================================  IN: 640 ml / OUT: 500 ml / NET: 140 ml    I & Os for current day (as of 29 Mar 2017 18:11)  =============================================  IN: 540 ml / OUT: 750 ml / NET: -210 ml          Thank you for the opportunity to assist with the care of this patient.   [facility] [phone].

## 2017-03-30 NOTE — PROGRESS NOTE ADULT - PROBLEM SELECTOR PLAN 2
ARF due to ATN and pre renal azotemia, CRF(late III-early IV)   -creatine remains ~2.45  -will c/w monitoring bun/cr    -Medications for fluid overload as stated above   -nephrology follow up noted and appreciated

## 2017-03-30 NOTE — PROGRESS NOTE ADULT - PROBLEM SELECTOR PLAN 1
S/P cardiac arrest with successful resuscitation 3/17/2017, hx of CAD and chronic diastolic HF now with EF: 30-35%, s/p R cardiac cath 3/27/17 with elevated CWP   -Cardiology and nephrology follow up noted and appreciated will c/w aggressive diuresis. Output from yesterday ~1.3L   -c/w Lasix 80mg IV BID, will follow with cardiology in regards to diuresis regimen   -s/p albumin -s/p zaroxolyn   -c/w coreg 6.25 po q12hrs   -c/w aspirin 81mg po qd  -c/w lipitor 20mg po qhs  -Daily weight  -Strict I/O

## 2017-03-30 NOTE — PROGRESS NOTE ADULT - PROBLEM SELECTOR PLAN 5
PAF   -c/w coreg 6.25mg po q12hrs   -cardiology f/u noted and appreciated   -c/w lovenox 70mg SQ QD renally adjusted -INR has not increased with 2.5mg po dose will give one time 5 mg po dose tonight   -f/u INR in the am currently subtherapeutic

## 2017-03-30 NOTE — PROGRESS NOTE ADULT - SUBJECTIVE AND OBJECTIVE BOX
[facility], [facility]                                                    [address] , [address], [place] [address]                                                         PHONE: [phone]    FAX: [phone] [address], [address], [place] [address]                                                 PHONE: [phone]    FAX: [phone]  *******************************************************************************    Overnight events/Subjective Assessment:    INTERPRETATION OF TELEMETRY (personally reviewed):    IV Contrast (Unknown)    MEDICATIONS  (STANDING):  dextrose 50% Injectable 12.5Gram(s) IV Push once  dextrose 50% Injectable 25Gram(s) IV Push once  dextrose 50% Injectable 25Gram(s) IV Push once  aspirin  chewable 81milliGRAM(s) Oral daily  atorvastatin 20milliGRAM(s) Oral at bedtime  insulin lispro (HumaLOG) corrective regimen sliding scale  SubCutaneous Before meals and at bedtime  ALBUTerol/ipratropium for Nebulization 3milliLiter(s) Nebulizer every 6 hours  lidocaine   Patch 1Patch Transdermal daily  pantoprazole    Tablet 40milliGRAM(s) Oral before breakfast  famotidine    Tablet 20milliGRAM(s) Oral at bedtime  docusate sodium 100milliGRAM(s) Oral three times a day  senna 2Tablet(s) Oral at bedtime  carvedilol 6.25milliGRAM(s) Oral every 12 hours  dextrose 5%. 1000milliLiter(s) IV Continuous <Continuous>  guaiFENesin    Syrup 100milliGRAM(s) Oral every 4 hours  enoxaparin Injectable 70milliGRAM(s) SubCutaneous daily  furosemide   Injectable 80milliGRAM(s) IV Push two times a day  insulin glargine Injectable (LANTUS) 13Unit(s) SubCutaneous at bedtime  predniSONE   Tablet 10milliGRAM(s) Oral daily  insulin lispro Injectable (HumaLOG) 8Unit(s) SubCutaneous three times a day before meals  warfarin 2.5milliGRAM(s) Oral <User Schedule>  epoetin conrado Injectable [id number](s) SubCutaneous <User Schedule>  polyethylene glycol 3350 17Gram(s) Oral once    MEDICATIONS  (PRN):  dextrose Gel 1Dose(s) Oral once PRN Blood Glucose LESS THAN 70 milliGRAM(s)/deciLiter  glucagon  Injectable 1milliGRAM(s) IntraMuscular once PRN Glucose <70 milliGRAM(s)/deciLiter  ALBUTerol    0.083% 2.5milliGRAM(s) Nebulizer every 3 hours PRN Shortness of Breath and/or Wheezing  acetaminophen   Tablet. 650milliGRAM(s) Oral every 6 hours PRN Moderate Pain (4 - 6)  aluminum hydroxide/magnesium hydroxide/simethicone Suspension 30milliLiter(s) Oral every 6 hours PRN Dyspepsia  metoprolol Injectable 5milliGRAM(s) IV Push every 15 minutes PRN for HR greater than 120 sustained a-fib  oxyCODONE  5 mG/acetaminophen 325 mG 2Tablet(s) Oral every 6 hours PRN Severe Pain (7 - 10)      Vital Signs Last 24 Hrs  T(C): 36.6, Max: 37.5 (03-29 @ 20:38)  T(F): 97.8, Max: 99.5 (03-29 @ 20:38)  HR: 82 (82 - 85)  BP: 102/51 (102/51 - 133/63)  BP(mean): --  RR: 18 (16 - 18)  SpO2: 99% (95% - 100%)    I&O's Detail    I & Os for current day (as of 30 Mar 2017 07:48)  =============================================  IN:    Oral Fluid: 540 ml    Total IN: 540 ml  ---------------------------------------------  OUT:    Voided: 1350 ml    Total OUT: 1350 ml  ---------------------------------------------  Total NET: -810 ml    I&O's Summary    I & Os for current day (as of 30 Mar 2017 07:48)  =============================================  IN: 540 ml / OUT: 1350 ml / NET: -810 ml          PHYSICAL EXAM:  General: Appears well developed, well nourished, no acute distress  HEENT: Head: normocephalic, atraumatic  Eyes: Pupils equal and reactive  Neck: Supple, no carotid bruit, no JVD, no HJR  CARDIOVASCULAR: Normal S1 and S2, no murmur, rub, or gallop  LUNGS: Clear to auscultation bilaterally, no rales, rhonchi or wheeze  ABDOMEN: Soft, nontender, non-distended, positive bowel sounds, no mass or bruit  EXTREMITIES: 1+ bilat LE edema, distal pulses WNL  SKIN: Warm and dry with normal turgor  NEURO: Alert & oriented x 3, grossly intact  PSYCH: normal mood and affect        LABS:                        9.0    9.6   )-----------( 141      ( 29 Mar 2017 06:53 )             27.6     29 Mar 2017 06:53    136    |  91     |  79.0   ----------------------------<  89     4.2     |  33.0   |  2.74     Ca    9.0        29 Mar 2017 06:53  Phos  4.0       29 Mar 2017 06:53  Mg     1.8       29 Mar 2017 06:53    TPro  5.5    /  Alb  2.8    /  TBili  0.4    /  DBili  x      /  AST  29     /  ALT  33     /  AlkPhos  68     29 Mar 2017 06:53        PT/INR - ( 29 Mar 2017 06:53 )   PT: 11.3 sec;   INR: 1.03 ratio           serum  Lipids:   Hemoglobin A1C, Whole Blood: 8.9 % (03-18 @ 06:25)    ECHO: 3/17/17  Summary:   1. Left ventricular ejection fraction, by visual estimation, is 30 to   35%.   2. Technically adequate study.   3. Moderately to severely decreased global left ventricular systolic   function.   4. Basal and mid anterior septum, basal and mid inferior septum, mid   anterolateral segment, and basal inferior segment are abnormal as   described above.   5. Normal left ventricular internal cavity size.   6. Trivial pericardial effusion.   7. Moderate mitral annular calcification.   8. Thickening of the anterior and posterior mitral valve leaflets.   9. Sclerotic aortic valve with normal opening.  10. Estimated pulmonary artery systolic pressure is 44.3 mmHg assuming a   right atrial pressure of 10 mmHg, which is consistent with mild pulmonary   hypertension.  11. Intra-atrial septal aneurysm.      ASSESSMENT AND PLAN:  In summary, SOCORRO JARVIS is a 76y Female with past medical history significant for DM, HTN, CRF, chronic diastolic CHF,  s/p cardiac arrest, diminished ejection fraction, s/p PAF. Attempt at cardiac catheterization 3/27 with left heart cath unable to be performed due to pt unable to lay flat.  - Right heart cardiac catheterization with elevated pulmonary pressure 73/32 and elevated wedge of 30.  Pt with symptoms of SOB and inability to lay flat.  LE edema mild. Chronic renal insufficiency and chronic diastolic congestive heart failure with low cardiac output.  - Continue to diurese. 810 cc net output over last 24 hours.  - CRI. Renal following  - s/p cardiac arrest. Low EF. Eventual LHC when able to lay flat for the procedure   - PAF.  Ideally pt needs to be on full AC would transition to renal dose Lovenox and eventual oral AC however pt reports hx fall. May not be a good long term a/c candidate  -  IV Lasix for continued diuresis  - Eventual reattempt at left heart catheterization when pt stable to lie down flat on.      Monet Ovalles MD

## 2017-03-30 NOTE — PROGRESS NOTE ADULT - ASSESSMENT
T2DM- Bgs are slightly getting better  prednisone 10 mg qd  continue lantus 13 u HS   increase humalog to 10 u tID for meals   continue SSI for now   1800 ADA

## 2017-03-30 NOTE — PROGRESS NOTE ADULT - ASSESSMENT
CRF(late III-early IV): s/p cardiac and respiratory arrest  ARF due to ATN and pre renal azotemia  Elevated BUN in part due to steroids  - avoid potential nephrotoxic agents  - diuretics to keep with a degree of azotemia==> will defer dosing to cardiology  - daily weights and monitor labs  -  pt requires further cardiac cath==> on hold for now until more stable    Anemia: + CRF  - addeed JENNI; full Fe stores pending

## 2017-03-30 NOTE — PROGRESS NOTE ADULT - PROBLEM SELECTOR PLAN 6
HBA1C:8.9   -endocrinology f/u noted and appreciated   -will c/w lantus 13units qhs   -increased humalog to 10units premeal and HSS   -steroids continue to be tapered will d/c steroids after tomorrows dose and then hopefully see improvement in patients fs

## 2017-03-30 NOTE — PROGRESS NOTE ADULT - SUBJECTIVE AND OBJECTIVE BOX
Patient is a 76y old  Female who presents with a chief complaint of Unresponsive/Cardiac Arrest (17 Mar 2017 11:27)      HEALTH ISSUES - PROBLEM Dx:  Asthma: Asthma  Sternal pain: Sternal pain  Acute systolic heart failure: Acute systolic heart failure  Atrial fibrillation: Atrial fibrillation  Hypertension: Hypertension  Diabetes: Diabetes  Acute on chronic kidney failure: Acute on chronic kidney failure  Type 2 diabetes mellitus with nephropathy: Type 2 diabetes mellitus with nephropathy  Constipation, unspecified constipation type: Constipation, unspecified constipation type  Encounter for palliative care: Encounter for palliative care  Pain: Pain  Chronic congestive heart failure, unspecified congestive heart failure type: Chronic congestive heart failure, unspecified congestive heart failure type  Cough: Cough  Abnormal chest x-ray: Abnormal chest x-ray  Prophylactic measure: Prophylactic measure  Hypercarbia: Hypercarbia  Hypoxia: Hypoxia  Congestive heart failure: Congestive heart failure  Diabetes mellitus of other type with oral complication, without long-term current use of insulin: Diabetes mellitus of other type with oral complication, without long-term current use of insulin  Kidney disease: Kidney disease  Cardiopulmonary arrest with successful resuscitation: Cardiopulmonary arrest with successful resuscitation  Hypoglycemia: Hypoglycemia  SENTHIL (acute kidney injury): SENTHIL (acute kidney injury)  Respiratory distress: Respiratory distress  Cardiac arrest: Cardiac arrest      INTERVAL HPI/OVERNIGHT EVENTS:  Patient seen and examined at bedside. No acute events overnight. Patient states she is urinating often on the diuretic. Patient has minimal left chest sternal pain 3/10, well controlled on medication Patient denies SOB, abd pain, N/V, fever, chills, dysuria or any other complaints. All remainder ROS negative.       Vital Signs Last 24 Hrs  T(C): 36.7, Max: 37.5 (03-29 @ 20:38)  T(F): 98, Max: 99.5 (03-29 @ 20:38)  HR: 81 (75 - 85)  BP: 112/68 (90/60 - 133/63)  BP(mean): --  RR: 16 (16 - 18)  SpO2: 100% (95% - 100%)    CAPILLARY BLOOD GLUCOSE  126 (30 Mar 2017 17:08)  103 (30 Mar 2017 13:08)  142 (30 Mar 2017 08:10)  220 (29 Mar 2017 22:46)  272 (29 Mar 2017 19:52)      I&O's Summary    I & Os for current day (as of 30 Mar 2017 17:43)  =============================================  IN: 540 ml / OUT: 1350 ml / NET: -810 ml      CONSTITUTIONAL: Well appearing, well nourished, awake, alert and in no apparent distress  CARDIAC: Normal rate, regular rhythm.  Heart sounds S1, S2.  No murmurs, rubs or gallops, mild pain on palpation to sternum and left chest   RESPIRATORY: Decreased BS bilaterally. Minimal exp wheezing   ABDOMEN: Soft, NT ND BS+  EXTREMITIES: +2 b/l peripheral edema, cyanosis or deformity   SKIN: No rash, chronic skin changes     MEDICATIONS  (STANDING):  dextrose 50% Injectable 12.5Gram(s) IV Push once  dextrose 50% Injectable 25Gram(s) IV Push once  dextrose 50% Injectable 25Gram(s) IV Push once  aspirin  chewable 81milliGRAM(s) Oral daily  atorvastatin 20milliGRAM(s) Oral at bedtime  insulin lispro (HumaLOG) corrective regimen sliding scale  SubCutaneous Before meals and at bedtime  ALBUTerol/ipratropium for Nebulization 3milliLiter(s) Nebulizer every 6 hours  lidocaine   Patch 1Patch Transdermal daily  pantoprazole    Tablet 40milliGRAM(s) Oral before breakfast  famotidine    Tablet 20milliGRAM(s) Oral at bedtime  docusate sodium 100milliGRAM(s) Oral three times a day  senna 2Tablet(s) Oral at bedtime  carvedilol 6.25milliGRAM(s) Oral every 12 hours  dextrose 5%. 1000milliLiter(s) IV Continuous <Continuous>  guaiFENesin    Syrup 100milliGRAM(s) Oral every 4 hours  enoxaparin Injectable 70milliGRAM(s) SubCutaneous daily  insulin glargine Injectable (LANTUS) 13Unit(s) SubCutaneous at bedtime  predniSONE   Tablet 10milliGRAM(s) Oral daily  insulin lispro Injectable (HumaLOG) 8Unit(s) SubCutaneous three times a day before meals  epoetin conrado Injectable [id number](s) SubCutaneous <User Schedule>  warfarin 4milliGRAM(s) Oral <User Schedule>    MEDICATIONS  (PRN):  dextrose Gel 1Dose(s) Oral once PRN Blood Glucose LESS THAN 70 milliGRAM(s)/deciLiter  glucagon  Injectable 1milliGRAM(s) IntraMuscular once PRN Glucose <70 milliGRAM(s)/deciLiter  ALBUTerol    0.083% 2.5milliGRAM(s) Nebulizer every 3 hours PRN Shortness of Breath and/or Wheezing  acetaminophen   Tablet. 650milliGRAM(s) Oral every 6 hours PRN Moderate Pain (4 - 6)  aluminum hydroxide/magnesium hydroxide/simethicone Suspension 30milliLiter(s) Oral every 6 hours PRN Dyspepsia  metoprolol Injectable 5milliGRAM(s) IV Push every 15 minutes PRN for HR greater than 120 sustained a-fib  oxyCODONE  5 mG/acetaminophen 325 mG 2Tablet(s) Oral every 6 hours PRN Severe Pain (7 - 10)      LABS:                        9.8    8.4   )-----------( 134      ( 30 Mar 2017 07:58 )             30.1     30 Mar 2017 07:58    131    |  86     |  77.0   ----------------------------<  125    4.5     |  31.0   |  2.45     Ca    9.1        30 Mar 2017 07:58  Phos  4.0       29 Mar 2017 06:53  Mg     1.8       30 Mar 2017 07:58    TPro  5.5    /  Alb  2.8    /  TBili  0.4    /  DBili  x      /  AST  29     /  ALT  33     /  AlkPhos  68     29 Mar 2017 06:53    PT/INR - ( 30 Mar 2017 07:58 )   PT: 11.0 sec;   INR: 1.00 ratio             LIVER FUNCTIONS - ( 29 Mar 2017 06:53 )  Alb: 2.8 g/dL / Pro: 5.5 g/dL / ALK PHOS: 68 U/L / ALT: 33 U/L / AST: 29 U/L / GGT: x             RADIOLOGY & ADDITIONAL TESTS:

## 2017-03-30 NOTE — PROGRESS NOTE ADULT - SUBJECTIVE AND OBJECTIVE BOX
INTERVAL HPI/OVERNIGHT EVENTS:  Pt eating well, uneventfulnight. She still complains of some chest pain , thought to be musculoskeletal.   Steroids were decreased prednisone 10 mg qd and Bgs am 140-150 later in the day 260-270.     MEDICATIONS  (STANDING):  dextrose 50% Injectable 12.5Gram(s) IV Push once  dextrose 50% Injectable 25Gram(s) IV Push once  dextrose 50% Injectable 25Gram(s) IV Push once  aspirin  chewable 81milliGRAM(s) Oral daily  atorvastatin 20milliGRAM(s) Oral at bedtime  insulin lispro (HumaLOG) corrective regimen sliding scale  SubCutaneous Before meals and at bedtime  ALBUTerol/ipratropium for Nebulization 3milliLiter(s) Nebulizer every 6 hours  lidocaine   Patch 1Patch Transdermal daily  pantoprazole    Tablet 40milliGRAM(s) Oral before breakfast  famotidine    Tablet 20milliGRAM(s) Oral at bedtime  docusate sodium 100milliGRAM(s) Oral three times a day  senna 2Tablet(s) Oral at bedtime  carvedilol 6.25milliGRAM(s) Oral every 12 hours  dextrose 5%. 1000milliLiter(s) IV Continuous <Continuous>  guaiFENesin    Syrup 100milliGRAM(s) Oral every 4 hours  enoxaparin Injectable 70milliGRAM(s) SubCutaneous daily  insulin glargine Injectable (LANTUS) 13Unit(s) SubCutaneous at bedtime  predniSONE   Tablet 10milliGRAM(s) Oral daily  insulin lispro Injectable (HumaLOG) 8Unit(s) SubCutaneous three times a day before meals  warfarin 2.5milliGRAM(s) Oral <User Schedule>  epoetin conrado Injectable [id number](s) SubCutaneous <User Schedule>  polyethylene glycol 3350 17Gram(s) Oral once    MEDICATIONS  (PRN):  dextrose Gel 1Dose(s) Oral once PRN Blood Glucose LESS THAN 70 milliGRAM(s)/deciLiter  glucagon  Injectable 1milliGRAM(s) IntraMuscular once PRN Glucose <70 milliGRAM(s)/deciLiter  ALBUTerol    0.083% 2.5milliGRAM(s) Nebulizer every 3 hours PRN Shortness of Breath and/or Wheezing  acetaminophen   Tablet. 650milliGRAM(s) Oral every 6 hours PRN Moderate Pain (4 - 6)  aluminum hydroxide/magnesium hydroxide/simethicone Suspension 30milliLiter(s) Oral every 6 hours PRN Dyspepsia  metoprolol Injectable 5milliGRAM(s) IV Push every 15 minutes PRN for HR greater than 120 sustained a-fib  oxyCODONE  5 mG/acetaminophen 325 mG 2Tablet(s) Oral every 6 hours PRN Severe Pain (7 - 10)    Allergies  IV Contrast (Unknown)    Review of systems:  no SOb, has CP, feels tired, eating better and better appetite, no diarrhea, no dizziness, no blurry vision, no numbness, no constipation, no dysuria, no burning on urination.     Vital Signs Last 24 Hrs  T(C): 36.6, Max: 37.5 (03-29 @ 20:38)  T(F): 97.8, Max: 99.5 (03-29 @ 20:38)  HR: 75 (75 - 85)  BP: 104/60 (102/51 - 133/63)  BP(mean): --  RR: 17 (16 - 18)  SpO2: 100% (95% - 100%)    PHYSICAL EXAM:  Constitutional: NAD, well-groomed, well-developed  HEENT: PERRLA, EOMI, no exophalmos  Neck: No LAD, No JVD, trachea midline, no thyroid enlargement  Respiratory: CTAB  Cardiovascular: S1 and S2, RRR, no M/G/R  Gastrointestinal: BS+, soft, no organomeglag or mass  Extremities: pitting edema 1+, stasis dermatitis  Vascular: 2+ peripheral pulses  Neurological: A/O x 3, no focal deficits  Psychiatric: Normal mood, normal affect  Musculoskeletal: 5/5 strength b/l upper and lower extremities      LABS:                        9.0    9.6   )-----------( 141      ( 29 Mar 2017 06:53 )             27.6     29 Mar 2017 06:53    136    |  91     |  79.0   ----------------------------<  89     4.2     |  33.0   |  2.74     Ca    9.0        29 Mar 2017 06:53  Phos  4.0       29 Mar 2017 06:53  Mg     1.8       29 Mar 2017 06:53    TPro  5.5    /  Alb  2.8    /  TBili  0.4    /  DBili  x      /  AST  29     /  ALT  33     /  AlkPhos  68     29 Mar 2017 06:53    CAPILLARY BLOOD GLUCOSE  142 (30 Mar 2017 08:10)  220 (29 Mar 2017 22:46)  272 (29 Mar 2017 19:52)  242 (29 Mar 2017 11:35)  162 (29 Mar 2017 08:20)  292 (28 Mar 2017 21:42)  341 (28 Mar 2017 18:00)  401 (28 Mar 2017 11:00)  407 (28 Mar 2017 08:00)  360 (27 Mar 2017 22:05)  364 (27 Mar 2017 21:00)  311 (27 Mar 2017 10:53)  273 (27 Mar 2017 08:00)  305 (26 Mar 2017 22:17)  302 (26 Mar 2017 18:00)  221 (26 Mar 2017 11:38)      RADIOLOGY & ADDITIONAL TESTS:

## 2017-03-30 NOTE — PROGRESS NOTE ADULT - SUBJECTIVE AND OBJECTIVE BOX
NEPHROLOGY INTERVAL HPI/OVERNIGHT EVENTS:  pt seated in bed and comfortable  no acute distress noted  notes generalized weakness    MEDICATIONS  (STANDING):  dextrose 50% Injectable 12.5Gram(s) IV Push once  dextrose 50% Injectable 25Gram(s) IV Push once  dextrose 50% Injectable 25Gram(s) IV Push once  aspirin  chewable 81milliGRAM(s) Oral daily  atorvastatin 20milliGRAM(s) Oral at bedtime  insulin lispro (HumaLOG) corrective regimen sliding scale  SubCutaneous Before meals and at bedtime  ALBUTerol/ipratropium for Nebulization 3milliLiter(s) Nebulizer every 6 hours  lidocaine   Patch 1Patch Transdermal daily  pantoprazole    Tablet 40milliGRAM(s) Oral before breakfast  famotidine    Tablet 20milliGRAM(s) Oral at bedtime  docusate sodium 100milliGRAM(s) Oral three times a day  senna 2Tablet(s) Oral at bedtime  carvedilol 6.25milliGRAM(s) Oral every 12 hours  dextrose 5%. 1000milliLiter(s) IV Continuous <Continuous>  guaiFENesin    Syrup 100milliGRAM(s) Oral every 4 hours  enoxaparin Injectable 70milliGRAM(s) SubCutaneous daily  insulin glargine Injectable (LANTUS) 13Unit(s) SubCutaneous at bedtime  predniSONE   Tablet 10milliGRAM(s) Oral daily  insulin lispro Injectable (HumaLOG) 8Unit(s) SubCutaneous three times a day before meals  warfarin 2.5milliGRAM(s) Oral <User Schedule>  epoetin conrado Injectable [id number](s) SubCutaneous <User Schedule>  polyethylene glycol 3350 17Gram(s) Oral once    MEDICATIONS  (PRN):  dextrose Gel 1Dose(s) Oral once PRN Blood Glucose LESS THAN 70 milliGRAM(s)/deciLiter  glucagon  Injectable 1milliGRAM(s) IntraMuscular once PRN Glucose <70 milliGRAM(s)/deciLiter  ALBUTerol    0.083% 2.5milliGRAM(s) Nebulizer every 3 hours PRN Shortness of Breath and/or Wheezing  acetaminophen   Tablet. 650milliGRAM(s) Oral every 6 hours PRN Moderate Pain (4 - 6)  aluminum hydroxide/magnesium hydroxide/simethicone Suspension 30milliLiter(s) Oral every 6 hours PRN Dyspepsia  metoprolol Injectable 5milliGRAM(s) IV Push every 15 minutes PRN for HR greater than 120 sustained a-fib  oxyCODONE  5 mG/acetaminophen 325 mG 2Tablet(s) Oral every 6 hours PRN Severe Pain (7 - 10)      Allergies    IV Contrast (Unknown)    Intolerances          Vital Signs Last 24 Hrs  T(C): 36.6, Max: 37.5 (03-29 @ 20:38)  T(F): 97.8, Max: 99.5 (03-29 @ 20:38)  HR: 75 (75 - 85)  BP: 104/60 (102/51 - 133/63)  BP(mean): --  RR: 17 (16 - 18)  SpO2: 100% (95% - 100%)    PHYSICAL EXAM:    GENERAL: NAD, generalized weakness  HEAD:  Atraumatic, Normocephalic  EYES: EOMI, PERRLA, conjunctiva and sclera clear  NECK: Supple, No JVD, Normal thyroid  NERVOUS SYSTEM:  Alert & Oriented X3,  intact and symmetric  CHEST/LUNG: Diminished BS at bases  HEART: Regular rate and rhythm; No rub  ABDOMEN: Soft, Nontender, Nondistended; Bowel sounds present  EXTREMITIES:  2+ Peripheral Pulses, tr edema  LYMPH: No lymphadenopathy noted  SKIN: No rashes or lesions    LABS:                        9.0    9.6   )-----------( 141      ( 29 Mar 2017 06:53 )             27.6     30 Mar 2017 07:58    131    |  86     |  77.0   ----------------------------<  125    4.5     |  31.0   |  2.45     Ca    9.1        30 Mar 2017 07:58  Phos  4.0       29 Mar 2017 06:53  Mg     1.8       30 Mar 2017 07:58    TPro  5.5    /  Alb  2.8    /  TBili  0.4    /  DBili  x      /  AST  29     /  ALT  33     /  AlkPhos  68     29 Mar 2017 06:53    PT/INR - ( 30 Mar 2017 07:58 )   PT: 11.0 sec;   INR: 1.00 ratio             Magnesium, Serum: 1.8 mg/dL (03-30 @ 07:58)      RADIOLOGY & ADDITIONAL TESTS:   EXAM:  CHEST SINGLE VIEW FRONTAL                          PROCEDURE DATE:  03/28/2017        INTERPRETATION:  CHEST AP PORTABLE:    History: Pneumonia.     Date and time of exam: 3/27/2017 10:35 PM.    Technique: A single AP view of the chest was obtained.    Comparison exam: 3/21/2017.    Findings:  The heart is enlarged. There is a left pleural effusion, unchanged. There   is an increasing right pleural effusion since the prior study. No hilar   or mediastinal abnormality..    Impression:  Cardiomegaly and left pleural effusion, unchanged. Increasing right   pleural effusion since 3/21/2017..

## 2017-03-31 NOTE — PROGRESS NOTE ADULT - ASSESSMENT
76 F admitted s/p cardiac arrest, likely secondary to hypoglycemia induced hypercapnia (acute on chronic) from stupor causing increased myocardial demand. ROSC with return of mental status, therefore no modified hypothermia, intubated 3/17 extubated 3/18, was on dobutamine infusion for cardiogenic shock. Pt was transferred to medical service on 3/19/17 and continues to be followed by cardiology and nephrology. Patient was optimized for cardiac catheterization and patient/son wanted to pursue cardiac catheterization knowing risks involved with further decline in renal function and possible need for dialysis. CRF(late III-early IV) and ARF due to ATN and pre renal azotemia. Pt s/p R heart cath 3/27/17, L heart cath unable to be performed which showed elevated CWP. Pt continues to be diuresed on a daily basis depending on renal function. Possible L heart cath Monday if cleared by nephrology.

## 2017-03-31 NOTE — PROGRESS NOTE ADULT - ASSESSMENT
steadily improving  s/p RHC only  await LHC  At high risk for OCTAVIO d/rtCKD age and DM  check 24 H urine studies

## 2017-03-31 NOTE — PROGRESS NOTE ADULT - PROBLEM SELECTOR PLAN 5
PAF   -c/w coreg 6.25mg po q12hrs   -cardiology f/u noted and appreciated   -c/w lovenox 70mg SQ QD renally adjusted -INR has not increased will give another dose of coumadin 5mg x 1 dose tonight and f/u INR in the am currently remains subtherapeutic

## 2017-03-31 NOTE — PROGRESS NOTE ADULT - PROBLEM SELECTOR PLAN 2
ARF due to ATN and pre renal azotemia, CRF(late III-early IV)   -creatine remains ~2.59  -will c/w monitoring bun/cr    -Medications for fluid overload as stated above   -c/w renal restriction and avoid nephrotoxic medications

## 2017-03-31 NOTE — PROGRESS NOTE ADULT - PROBLEM SELECTOR PLAN 6
HBA1C:8.9   -c/w accuchecks ACHS   -endocrinology f/u noted and appreciated   -will c/w lantus 13units qhs   -c/w humalog to 8units premeal and low dose HSS   - d/c steroids today so hopefully will see improvement in patients fs

## 2017-03-31 NOTE — PROGRESS NOTE ADULT - PROBLEM SELECTOR PLAN 1
S/P cardiac arrest with successful resuscitation 3/17/2017, hx of CAD and chronic diastolic HF now with reduced EF: 30-35%, s/p R cardiac cath 3/27/17 with elevated CWP   -Cardiology follow up noted and appreciated will c/w day to day diuresis. Today pt received lasix 40mg IV x 1 dose. Possible L heart cath monday if cleared by nephrology.   -s/p 2 days of lasix 80mg IV BID  Output from yesterday ~1.0 liter   -c/w coreg 6.25 po q12hrs   -c/w aspirin 81mg po qd  -c/w lipitor 20mg po qhs  -Daily weight  -Strict I/O  -low salt diet S/P cardiac arrest with successful resuscitation 3/17/2017, hx of CAD and chronic diastolic HF now with reduced EF: 30-35%, s/p R cardiac cath 3/27/17 with elevated CWP   -Cardiology follow up noted and appreciated will c/w day to day diuresis. Today pt received lasix 40mg IV x 1 dose. Possible L heart cath monday if cleared by nephrology.   -s/p 2 days of lasix 80mg IV BID  Output from yesterday ~1.0 liter   -c/w coreg 6.25 po q12hrs   -c/w aspirin 81mg po qd  -c/w lipitor 20mg po qhs  -Daily weight  -Strict I/O  -low salt diet  -f/u bmp/bnp in am  -f/u cxr in am

## 2017-03-31 NOTE — PROGRESS NOTE ADULT - ASSESSMENT
T2DM- Bgs are slightly getting better  prednisone 10 mg qd  continue lantus 13 u HS   continue lispro 8 tId w meals.   continue SSI for now   1800 ADA

## 2017-03-31 NOTE — PROGRESS NOTE ADULT - SUBJECTIVE AND OBJECTIVE BOX
INTERVAL HPI/OVERNIGHT EVENTS:  Uneventful night. Bgs better yesterday. Slightly high today am (eat last night snack??)    MEDICATIONS  (STANDING):  dextrose 50% Injectable 12.5Gram(s) IV Push once  dextrose 50% Injectable 25Gram(s) IV Push once  dextrose 50% Injectable 25Gram(s) IV Push once  aspirin  chewable 81milliGRAM(s) Oral daily  atorvastatin 20milliGRAM(s) Oral at bedtime  insulin lispro (HumaLOG) corrective regimen sliding scale  SubCutaneous Before meals and at bedtime  ALBUTerol/ipratropium for Nebulization 3milliLiter(s) Nebulizer every 6 hours  lidocaine   Patch 1Patch Transdermal daily  pantoprazole    Tablet 40milliGRAM(s) Oral before breakfast  famotidine    Tablet 20milliGRAM(s) Oral at bedtime  docusate sodium 100milliGRAM(s) Oral three times a day  senna 2Tablet(s) Oral at bedtime  carvedilol 6.25milliGRAM(s) Oral every 12 hours  dextrose 5%. 1000milliLiter(s) IV Continuous <Continuous>  guaiFENesin    Syrup 100milliGRAM(s) Oral every 4 hours  enoxaparin Injectable 70milliGRAM(s) SubCutaneous daily  insulin glargine Injectable (LANTUS) 13Unit(s) SubCutaneous at bedtime  insulin lispro Injectable (HumaLOG) 8Unit(s) SubCutaneous three times a day before meals  epoetin conrado Injectable [id number](s) SubCutaneous <User Schedule>  sodium biphosphate Rectal Enema 1Enema Rectal once  hydrALAZINE 25milliGRAM(s) Oral every 8 hours    MEDICATIONS  (PRN):  dextrose Gel 1Dose(s) Oral once PRN Blood Glucose LESS THAN 70 milliGRAM(s)/deciLiter  glucagon  Injectable 1milliGRAM(s) IntraMuscular once PRN Glucose <70 milliGRAM(s)/deciLiter  ALBUTerol    0.083% 2.5milliGRAM(s) Nebulizer every 3 hours PRN Shortness of Breath and/or Wheezing  acetaminophen   Tablet. 650milliGRAM(s) Oral every 6 hours PRN Moderate Pain (4 - 6)  aluminum hydroxide/magnesium hydroxide/simethicone Suspension 30milliLiter(s) Oral every 6 hours PRN Dyspepsia  metoprolol Injectable 5milliGRAM(s) IV Push every 15 minutes PRN for HR greater than 120 sustained a-fib  oxyCODONE  5 mG/acetaminophen 325 mG 2Tablet(s) Oral every 6 hours PRN Severe Pain (7 - 10)      Allergies  IV Contrast (Unknown)    Review of systems  \no SOb, has CP, feels tired, eating better and better appetite, no diarrhea, no dizziness, no blurry vision, no numbness, no constipation, no dysuria, no burning on urination.     Vital Signs Last 24 Hrs  T(C): 36.9, Max: 36.9 (03-31 @ 05:16)  T(F): 98.5, Max: 98.5 (03-31 @ 08:09)  HR: 80 (80 - 84)  BP: 100/56 (90/52 - 115/55)  BP(mean): --  RR: 16 (16 - 19)  SpO2: 97% (97% - 100%)    PHYSICAL EXAM:  Constitutional: NAD, well-groomed, well-developed  HEENT: PERRLA, EOMI, no exophalmos  Neck: No LAD, No JVD, trachea midline, no thyroid enlargement  Respiratory: CTAB  Cardiovascular: S1 and S2, RRR, no M/G/R  Gastrointestinal: BS+, soft, no organomeglag or mass  Extremities: pitting edema 1+, stasis dermatitis  Vascular: 2+ peripheral pulses  Neurological: A/O x 3, no focal deficits  Psychiatric: Normal mood, normal affect  Musculoskeletal: 5/5 strength b/l upper and lower extremities    LABS:                        10.0   10.0  )-----------( 161      ( 31 Mar 2017 06:12 )             31.5     31 Mar 2017 06:12    138    |  91     |  78.0   ----------------------------<  219    4.8     |  36.0   |  2.59     Ca    9.1        31 Mar 2017 06:12  Mg     1.9       31 Mar 2017 06:12      CAPILLARY BLOOD GLUCOSE  236 (31 Mar 2017 07:53)  160 (30 Mar 2017 22:26)  126 (30 Mar 2017 17:08)  103 (30 Mar 2017 13:08)  142 (30 Mar 2017 08:10)  220 (29 Mar 2017 22:46)  272 (29 Mar 2017 19:52)  242 (29 Mar 2017 11:35)  162 (29 Mar 2017 08:20)  292 (28 Mar 2017 21:42)  341 (28 Mar 2017 18:00)  401 (28 Mar 2017 11:00)  407 (28 Mar 2017 08:00)  360 (27 Mar 2017 22:05)  364 (27 Mar 2017 21:00)  311 (27 Mar 2017 10:53)      RADIOLOGY & ADDITIONAL TESTS:

## 2017-03-31 NOTE — PROGRESS NOTE ADULT - SUBJECTIVE AND OBJECTIVE BOX
[facility], [facility]                                                    [address] , [address], [place] [address]                                                         PHONE: [phone]    FAX: [phone] [address] [facility], [address], [place] [address]                                                 PHONE: [phone]    FAX: [phone]  *******************************************************************************    Overnight events/Subjective Assessment:    INTERPRETATION OF TELEMETRY (personally reviewed):    IV Contrast (Unknown)    MEDICATIONS  (STANDING):  dextrose 50% Injectable 12.5Gram(s) IV Push once  dextrose 50% Injectable 25Gram(s) IV Push once  dextrose 50% Injectable 25Gram(s) IV Push once  aspirin  chewable 81milliGRAM(s) Oral daily  atorvastatin 20milliGRAM(s) Oral at bedtime  insulin lispro (HumaLOG) corrective regimen sliding scale  SubCutaneous Before meals and at bedtime  ALBUTerol/ipratropium for Nebulization 3milliLiter(s) Nebulizer every 6 hours  lidocaine   Patch 1Patch Transdermal daily  pantoprazole    Tablet 40milliGRAM(s) Oral before breakfast  famotidine    Tablet 20milliGRAM(s) Oral at bedtime  docusate sodium 100milliGRAM(s) Oral three times a day  senna 2Tablet(s) Oral at bedtime  carvedilol 6.25milliGRAM(s) Oral every 12 hours  dextrose 5%. 1000milliLiter(s) IV Continuous <Continuous>  guaiFENesin    Syrup 100milliGRAM(s) Oral every 4 hours  enoxaparin Injectable 70milliGRAM(s) SubCutaneous daily  insulin glargine Injectable (LANTUS) 13Unit(s) SubCutaneous at bedtime  predniSONE   Tablet 10milliGRAM(s) Oral daily  insulin lispro Injectable (HumaLOG) 8Unit(s) SubCutaneous three times a day before meals  epoetin conrado Injectable [id number](s) SubCutaneous <User Schedule>  sodium biphosphate Rectal Enema 1Enema Rectal once    MEDICATIONS  (PRN):  dextrose Gel 1Dose(s) Oral once PRN Blood Glucose LESS THAN 70 milliGRAM(s)/deciLiter  glucagon  Injectable 1milliGRAM(s) IntraMuscular once PRN Glucose <70 milliGRAM(s)/deciLiter  ALBUTerol    0.083% 2.5milliGRAM(s) Nebulizer every 3 hours PRN Shortness of Breath and/or Wheezing  acetaminophen   Tablet. 650milliGRAM(s) Oral every 6 hours PRN Moderate Pain (4 - 6)  aluminum hydroxide/magnesium hydroxide/simethicone Suspension 30milliLiter(s) Oral every 6 hours PRN Dyspepsia  metoprolol Injectable 5milliGRAM(s) IV Push every 15 minutes PRN for HR greater than 120 sustained a-fib  oxyCODONE  5 mG/acetaminophen 325 mG 2Tablet(s) Oral every 6 hours PRN Severe Pain (7 - 10)      Vital Signs Last 24 Hrs  T(C): 36.9, Max: 36.9 (03-31 @ 05:16)  T(F): 98.5, Max: 98.5 (03-31 @ 08:09)  HR: 82 (77 - 84)  BP: 110/52 (90/52 - 115/55)  BP(mean): --  RR: 18 (16 - 19)  SpO2: 98% (98% - 100%)    I&O's Detail    I & Os for current day (as of 31 Mar 2017 08:12)  =============================================  IN:    Oral Fluid: 900 ml    Total IN: 900 ml  ---------------------------------------------  OUT:    Voided: 575 ml    Total OUT: 575 ml  ---------------------------------------------  Total NET: 325 ml    I&O's Summary    I & Os for current day (as of 31 Mar 2017 08:12)  =============================================  IN: 900 ml / OUT: 575 ml / NET: 325 ml          PHYSICAL EXAM:  General: Appears well developed, well nourished, no acute distress  HEENT: Head: normocephalic, atraumatic  Eyes: Pupils equal and reactive  Neck: Supple, no carotid bruit, no JVD, no HJR  CARDIOVASCULAR: Normal S1 and S2, no murmur, rub, or gallop  LUNGS: Clear to auscultation bilaterally, no rales, rhonchi or wheeze  ABDOMEN: Soft, nontender, non-distended, positive bowel sounds, no mass or bruit  EXTREMITIES: 1+edema LE bilat, distal pulses WNL  SKIN: Warm and dry with normal turgor  NEURO: Alert & oriented x 3, grossly intact  PSYCH: normal mood and affect        LABS:                        10.0   10.0  )-----------( 161      ( 31 Mar 2017 06:12 )             31.5     31 Mar 2017 06:12    138    |  91     |  78.0   ----------------------------<  219    4.8     |  36.0   |  2.59     Ca    9.1        31 Mar 2017 06:12  Mg     1.8       30 Mar 2017 07:58          PT/INR - ( 31 Mar 2017 06:12 )   PT: 11.6 sec;   INR: 1.05 ratio           serum  Lipids:   Hemoglobin A1C, Whole Blood: 8.9 % (03-18 @ 06:25)    CXR: Impression:  Cardiomegaly and left pleural effusion, unchanged. Increasing right   pleural effusion since 3/21/2017..    ASSESSMENT AND PLAN:  In summary, SOCORRO JARVIS is a 76y Female with past medical history significant for DM, HTN, CRF, chronic diastolic CHF,  s/p cardiac arrest, diminished ejection fraction, s/p PAF. Attempt at cardiac catheterization 3/27 with left heart cath unable to be performed due to pt unable to lay flat.  - Right heart cardiac catheterization with elevated pulmonary pressure 73/32 and elevated wedge of 30.  Pt with symptoms of SOB and inability to lay flat.  LE edema mild. Chronic renal insufficiency and chronic diastolic congestive heart failure with low cardiac output.  - Continues to diurese. 900 cc net output over last 24 hours.  - CRI. Renal following. Azotemia, but also received steroids.  - s/p cardiac arrest. Low EF. Eventual LHC when able to lay flat for the procedure. Pt has been sleeping in recliner as unable to lay flat. Maintain ASA, coreg, atorvastatin. On lovenox. Add hydralazine.  - PAF.  Ideally pt needs to be on full AC would transition to renal dose Lovenox and eventual oral AC however pt reports hx fall. May not be a good long term a/c candidate  - DM. Management per medical  - HTN. BP is controlled on current medications.  -  IV Lasix PRN for continued diuresis. No standing order written due to azotemia and renal insufficiency. Renal noted. Increased R pleural effusion on CXR 3/28.  Will give IV lasix 40mg today and reassess in AM. CXR in AM. BNP in AM  - Eventual reattempt at left heart catheterization when pt stable to lie down flat and allowed by optimization by renal. DW pt. Will ideally aim for Monday if ok with renal.      Monet Ovalles MD

## 2017-03-31 NOTE — PROGRESS NOTE ADULT - SUBJECTIVE AND OBJECTIVE BOX
Patient seen and examined    OOB/Ch; family present  comfortable  fully awake and following      I&O's Summary  I & Os for 24h ending 31 Mar 2017 07:00  =============================================  IN: 900 ml / OUT: 575 ml / NET: 325 ml    I & Os for current day (as of 31 Mar 2017 19:09)  =============================================  IN: 555 ml / OUT: 775 ml / NET: -220 ml      REVIEW OF SYSTEMS:    CONSTITUTIONAL: No F/C    RESPIRATORY: No cough or SOB  CARDIOVASCULAR: No CP/palpitations,    GASTROINTESTINAL: No abdominal , NVD   GENITOURINARY: No UTI sx  NEUROLOGICAL: No headaches/wk/numbness  MUSCULOSKELETAL:  No joint pain/swelling; No LBP  EXTREMITIES : + swelling LEs    Vital Signs Last 24 Hrs  T(C): 36.4, Max: 37.1 (03-31 @ 12:24)  T(F): 97.5, Max: 98.7 (03-31 @ 12:24)  HR: 80 (80 - 84)  BP: 118/55 (90/52 - 118/55)  BP(mean): --  RR: 16 (16 - 19)  SpO2: 100% (95% - 100%)    PHYSICAL EXAM:    GENERAL: NAD,   EYES:  conjunctiva and sclera clear  NECK: Supple, No JVD/Bruit  NERVOUS SYSTEM:  A/O x3,   CHEST:  CTA , few rales  HEART:  RRR, No murmurs  ABDOMEN: Soft, NT/ND BS+  EXTREMITIES:  No C/C,  + pitting Edema; NT  SKIN: No rashes    LABS:                        10.0   10.0  )-----------( 161      ( 31 Mar 2017 06:12 )             31.5     31 Mar 2017 06:12    138    |  91     |  78.0   ----------------------------<  219    4.8     |  36.0   |  2.59     Ca    9.1        31 Mar 2017 06:12  Mg     1.9       31 Mar 2017 06:12        MEDICATIONS  (STANDING):  dextrose Gel PRN  dextrose 50% Injectable  dextrose 50% Injectable  dextrose 50% Injectable  glucagon  Injectable PRN  aspirin  chewable  atorvastatin  insulin lispro (HumaLOG) corrective regimen sliding scale  ALBUTerol/ipratropium for Nebulization  lidocaine   Patch  pantoprazole    Tablet  famotidine    Tablet  docusate sodium  senna  carvedilol  ALBUTerol    0.083% PRN  acetaminophen   Tablet. PRN  dextrose 5%.  guaiFENesin    Syrup  aluminum hydroxide/magnesium hydroxide/simethicone Suspension PRN  metoprolol Injectable PRN  enoxaparin Injectable  insulin glargine Injectable (LANTUS)  insulin lispro Injectable (HumaLOG)  oxyCODONE  5 mG/acetaminophen 325 mG PRN  epoetin conrado Injectable  sodium biphosphate Rectal Enema  hydrALAZINE

## 2017-04-01 NOTE — PROGRESS NOTE ADULT - PROBLEM SELECTOR PLAN 2
- stable over last 24 hours  ARF due to ATN and pre renal azotemia, CRF(late III-early IV)   -will c/w monitoring bun/cr    -Medications for fluid overload as stated above   -c/w renal restriction and avoid nephrotoxic medications

## 2017-04-01 NOTE — PROGRESS NOTE ADULT - PROBLEM SELECTOR PLAN 6
Improving control   HBA1C:8.9   -c/w accuchecks ACHS   -endocrinology f/u noted and appreciated   -will c/w lantus 13units qhs   -c/w humalog to 8units premeal and low dose HSS   -off steroids now

## 2017-04-01 NOTE — PROGRESS NOTE ADULT - SUBJECTIVE AND OBJECTIVE BOX
[facility], [facility]                                                    [address] , [address], [place] [address]                                                         PHONE: [phone]    FAX: [phone] [address] [facility], [address], [place] [address]                                                 PHONE: [phone]    FAX: [phone]  *******************************************************************************    Overnight events/Subjective Assessment:  No chest pain or palpitations, breathing stable, + orthopnea    IV Contrast (Unknown)    MEDICATIONS  (STANDING):  dextrose 50% Injectable 12.5Gram(s) IV Push once  dextrose 50% Injectable 25Gram(s) IV Push once  dextrose 50% Injectable 25Gram(s) IV Push once  aspirin  chewable 81milliGRAM(s) Oral daily  atorvastatin 20milliGRAM(s) Oral at bedtime  insulin lispro (HumaLOG) corrective regimen sliding scale  SubCutaneous Before meals and at bedtime  ALBUTerol/ipratropium for Nebulization 3milliLiter(s) Nebulizer every 6 hours  lidocaine   Patch 1Patch Transdermal daily  pantoprazole    Tablet 40milliGRAM(s) Oral before breakfast  famotidine    Tablet 20milliGRAM(s) Oral at bedtime  docusate sodium 100milliGRAM(s) Oral three times a day  senna 2Tablet(s) Oral at bedtime  carvedilol 6.25milliGRAM(s) Oral every 12 hours  dextrose 5%. 1000milliLiter(s) IV Continuous <Continuous>  guaiFENesin    Syrup 100milliGRAM(s) Oral every 4 hours  enoxaparin Injectable 70milliGRAM(s) SubCutaneous daily  insulin glargine Injectable (LANTUS) 13Unit(s) SubCutaneous at bedtime  insulin lispro Injectable (HumaLOG) 8Unit(s) SubCutaneous three times a day before meals  epoetin conrado Injectable [id number](s) SubCutaneous <User Schedule>  sodium biphosphate Rectal Enema 1Enema Rectal once  hydrALAZINE 25milliGRAM(s) Oral every 8 hours    MEDICATIONS  (PRN):  dextrose Gel 1Dose(s) Oral once PRN Blood Glucose LESS THAN 70 milliGRAM(s)/deciLiter  glucagon  Injectable 1milliGRAM(s) IntraMuscular once PRN Glucose <70 milliGRAM(s)/deciLiter  ALBUTerol    0.083% 2.5milliGRAM(s) Nebulizer every 3 hours PRN Shortness of Breath and/or Wheezing  acetaminophen   Tablet. 650milliGRAM(s) Oral every 6 hours PRN Moderate Pain (4 - 6)  aluminum hydroxide/magnesium hydroxide/simethicone Suspension 30milliLiter(s) Oral every 6 hours PRN Dyspepsia  metoprolol Injectable 5milliGRAM(s) IV Push every 15 minutes PRN for HR greater than 120 sustained a-fib  oxyCODONE  5 mG/acetaminophen 325 mG 2Tablet(s) Oral every 6 hours PRN Severe Pain (7 - 10)      Vital Signs Last 24 Hrs  T(C): 36.7, Max: 37.1 (03-31 @ 12:24)  T(F): 98.1, Max: 98.7 (03-31 @ 12:24)  HR: 87 (80 - 87)  BP: 116/59 (100/62 - 140/73)  BP(mean): --  RR: 18 (16 - 18)  SpO2: 99% (95% - 100%)    I&O's Detail    I & Os for current day (as of 01 Apr 2017 11:25)  =============================================  IN:    Oral Fluid: 1055 ml    Total IN: 1055 ml  ---------------------------------------------  OUT:    Voided: 1225 ml    Total OUT: 1225 ml  ---------------------------------------------  Total NET: -170 ml    I&O's Summary    I & Os for current day (as of 01 Apr 2017 11:25)  =============================================  IN: 1055 ml / OUT: 1225 ml / NET: -170 ml          PHYSICAL EXAM:  General: Appears well developed, well nourished, no acute distress  HEENT: Head: normocephalic, atraumatic  Eyes: Pupils equal and reactive  Neck: Supple, no carotid bruit, no JVD, no HJR  CARDIOVASCULAR: Normal S1 and S2, no murmur, rub, or gallop  LUNGS: Decreased breath sounds bilaterally  ABDOMEN: Soft, nontender, non-distended, positive bowel sounds, no mass or bruit  EXTREMITIES: 2+ B/L LE pitting edema, 1+ DPs  SKIN: Warm and dry with normal turgor  NEURO: Alert & oriented x 3, grossly intact  PSYCH: normal mood and affect        LABS:                        9.5    8.8   )-----------( 170      ( 01 Apr 2017 07:32 )             30.1     01 Apr 2017 07:32    138    |  91     |  74.0   ----------------------------<  172    4.6     |  34.0   |  2.60     Ca    9.3        01 Apr 2017 07:32  Mg     1.9       31 Mar 2017 06:12          PT/INR - ( 01 Apr 2017 07:32 )   PT: 13.6 sec;   INR: 1.23 ratio           Serum Pro-Brain Natriuretic Peptide: 23125 pg/mL (04-01 @ 07:32)  serum  Lipids:   Hemoglobin A1C, Whole Blood: 8.9 % (03-18 @ 06:25)        RADIOLOGY & ADDITIONAL STUDIES:    TELEMETRY: NSR 70-80s, PVCs    ECHO (3/17/17):  PHYSICIAN INTERPRETATION:  Left Ventricle: The left ventricular internal cavity size is normal.  Global LV systolic function was moderately to severely decreased. Left   ventricular ejection fraction, by visual estimation, is 30 to 35%.  LV Wall Scoring:  The basal and mid anterior septum, basal and mid inferior septum, mid  anterolateral segment, and basal inferior segment are hypokinetic.  Right Ventricle: The right ventricular size is normal. RV systolic   function is normal. TV S' 0.1 m/s.  Left Atrium: Normal left atrial size. Intra-atrial septal aneurysm.  Right Atrium: The right atrium is normal in size.  Pericardium: Trivial pericardial effusion is present. The pericardial   effusion is anterior to the right ventricle.  Mitral Valve: Thickening of the anterior and posterior mitral valve   leaflets. There is moderate mitral annular calcification. Mild mitral   valve regurgitation is seen.  Tricuspid Valve: Trivial tricuspid regurgitation is visualized. Estimated   pulmonary artery systolic pressure is 44.3 mmHg assuming a right atrial   pressure of 10 mmHg, which is consistent with mild pulmonary hypertension.  Aortic Valve: The aortic valve is trileaflet. Sclerotic aortic valve with   normal opening. No evidence of aortic valve regurgitation is seen.  Pulmonic Valve: The pulmonic valve was not well visualized.  Aorta: The aortic root is normal in size and structure.  Venous: A normal flow pattern is recorded from the right upper pulmonary   vein. Patient on Mechanical ventilation unable to assess Right Atrial   pressure.  Summary:   1. Left ventricular ejection fraction, by visual estimation, is 30 to 35%.   2. Technically adequate study.   3. Moderately to severely decreased global left ventricular systolic function.   4. Basal and mid anterior septum, basal and mid inferior septum, mid   anterolateral segment, and basal inferior segment are abnormal as   described above.   5. Normal left ventricular internal cavity size.   6. Trivial pericardial effusion.   7. Moderate mitral annular calcification.   8. Thickening of the anterior and posterior mitral valve leaflets.   9. Sclerotic aortic valve with normal opening.  10. Estimated pulmonary artery systolic pressure is 44.3 mmHg assuming a   right atrial pressure of 10 mmHg, which is consistent with mild pulmonary hypertension.  11. Intra-atrial septal aneurysm.      ASSESSMENT AND PLAN:  In summary, SOCORRO JARVIS is a 76F a/w cardiac arrest, acute systolic CHF exacerbation, ARF/CRI, mild troponin increase (peak 0.4), CM (EF 30-35%), s/p RHC 3/27/17->severe pulmonary HTN/PCWP 30, s/p PAF->NSR, IDDM, HTN, HL, asthma  - Given new severe CM and cardiac arrest, plan for cardiac catheterization once optimized to r/o obstructive CAD.  patient was unable to have C 3/27 because unable to lie flat.  Will tentatively schedule cardiac cath Mon 4/3 if stable and cleared by nephrology.  Will need to make NPO past midnight & hold Lovenox in AM 4/3 if cath to be performed.  - Echocardiogram 3/17/17 demonstrated moderately to severely decreased LV fxn (EF 30-35%), + SWMA, mild MR, trace TR, mild pulmonary HTN (RVSP 44.3 mmHg), ASA, trivial pericardial effusion  - IV diuresis in place with Lasix 40 mg IV daily for now.  Monitor strict I/Os, daily weights & BUN/Cr closely and titrate PRN.  Nephrology follow-up.  - Rhythm/hemodynamics stable = continue current doses of Coreg 6.25 bid, Hydralazine 25 tid & Imdur 30 daily for now and titrate PRN  - ASA 81 & Lipitor 20 daily in place  - Patient had PAF now in NSR with an increased [id number] risk score.  She is currently on AC with Lovenox renally dosed 1 mg/kg SQ daily.  Hold Coumadin for now given plan for cardiac catheterization.    - Glycemic control per endocrine    Angel Patterson MD

## 2017-04-01 NOTE — PROGRESS NOTE ADULT - PROBLEM SELECTOR PLAN 5
PAF   -c/w coreg for rate control   -cardiology f/u noted and appreciated   -c/w lovenox 70mg SQ QD renally adjusted   - coumadin on hold for left heart cath scheduled on Monday

## 2017-04-01 NOTE — PROGRESS NOTE ADULT - SUBJECTIVE AND OBJECTIVE BOX
CC: Patient denied any complaints today.  Anxious about left heart cath scheduled for monday 4/3    HPI:  68[facility], [facility], [facility], CHF (diastolic). Found lethargic at home this morning by her son. Her BG was 22, the son attempted to give her juice,, but she collapsed. EMS arrived and she was found in PEA. Resuscitation was undertaken, after several rounds of Epinephrine there was ROSC. Upon arrival to the ER she again developed PEA, resuscitation was resumed and again there was ROSC,  however she was in shock and placed on Levophed. Initial ABG showed a severe respiratory acidosis. She was on full vent support and settings were adjusted. She was spontaneously breathing but had no spontaneous movement and was not responding to her name, so the modified hypothermia protocol was initiated. EKG showed no acute St/T changes. In speaking with the patient's son, she had been complaining of general lethargy and weakness. No recent chest pain, cough fever. (17 Mar 2017 11:27)    REVIEW OF SYSTEMS:    Patient denied fever, chills, abdominal pain, nausea, vomiting, cough, shortness of breath, chest pain or palpitations    Vital Signs Last 24 Hrs  T(C): 36.7, Max: 36.8 (03-31 @ 21:52)  T(F): 98, Max: 98.2 (03-31 @ 21:52)  HR: 85 (82 - 87)  BP: 122/58 (106/64 - 140/73)  BP(mean): --  RR: 12 (12 - 18)  SpO2: 100% (99% - 100%)I&O's Summary    I & Os for current day (as of 01 Apr 2017 18:28)  =============================================  IN: 1055 ml / OUT: 1225 ml / NET: -170 ml  CAPILLARY BLOOD GLUCOSE  229 (01 Apr 2017 17:34)  168 (01 Apr 2017 13:33)  156 (01 Apr 2017 09:49)  125 (31 Mar 2017 21:52)    PHYSICAL EXAM:  GENERAL: NAD, well-groomed  HEENT: PERRL, +EOMI, anicteric, no [place]  NECK: Supple, No JVD   CHEST/LUNG: diminished bilaterally; Normal effort  HEART: S1S2 decreased intensity, no murmurs, gallops or rubs noted  ABDOMEN: Soft, BS Normoactive, NT, ND, no HSM noted  EXTREMITIES:  no clubbing, cyanosis, noted, limited ROM, + edema  SKIN: No rashes or lesions noted  NEURO: A&Ox2 (time), no focal deficits noted, CN II-XII intact  PSYCH: normal mood and affect; insight/judgement appropriate    LABS:                        9.5    8.8   )-----------( 170      ( 01 Apr 2017 07:32 )             30.1     01 Apr 2017 07:32    138    |  91     |  74.0   ----------------------------<  172    4.6     |  34.0   |  2.60     Ca    9.3        01 Apr 2017 07:32  Mg     1.9       31 Mar 2017 06:12      PT/INR - ( 01 Apr 2017 07:32 )   PT: 13.6 sec;   INR: 1.23 ratio             RADIOLOGY & ADDITIONAL TESTS:    MEDICATIONS:  MEDICATIONS  (STANDING):  dextrose 50% Injectable 12.5Gram(s) IV Push once  dextrose 50% Injectable 25Gram(s) IV Push once  dextrose 50% Injectable 25Gram(s) IV Push once  aspirin  chewable 81milliGRAM(s) Oral daily  atorvastatin 20milliGRAM(s) Oral at bedtime  insulin lispro (HumaLOG) corrective regimen sliding scale  SubCutaneous Before meals and at bedtime  ALBUTerol/ipratropium for Nebulization 3milliLiter(s) Nebulizer every 6 hours  lidocaine   Patch 1Patch Transdermal daily  pantoprazole    Tablet 40milliGRAM(s) Oral before breakfast  famotidine    Tablet 20milliGRAM(s) Oral at bedtime  docusate sodium 100milliGRAM(s) Oral three times a day  senna 2Tablet(s) Oral at bedtime  carvedilol 6.25milliGRAM(s) Oral every 12 hours  dextrose 5%. 1000milliLiter(s) IV Continuous <Continuous>  guaiFENesin    Syrup 100milliGRAM(s) Oral every 4 hours  enoxaparin Injectable 70milliGRAM(s) SubCutaneous daily  insulin glargine Injectable (LANTUS) 13Unit(s) SubCutaneous at bedtime  insulin lispro Injectable (HumaLOG) 8Unit(s) SubCutaneous three times a day before meals  epoetin conrado Injectable [id number](s) SubCutaneous <User Schedule>  sodium biphosphate Rectal Enema 1Enema Rectal once  hydrALAZINE 25milliGRAM(s) Oral every 8 hours  isosorbide   mononitrate ER Tablet (IMDUR) 30milliGRAM(s) Oral daily  furosemide   Injectable 40milliGRAM(s) IV Push daily    MEDICATIONS  (PRN):  dextrose Gel 1Dose(s) Oral once PRN Blood Glucose LESS THAN 70 milliGRAM(s)/deciLiter  glucagon  Injectable 1milliGRAM(s) IntraMuscular once PRN Glucose <70 milliGRAM(s)/deciLiter  ALBUTerol    0.083% 2.5milliGRAM(s) Nebulizer every 3 hours PRN Shortness of Breath and/or Wheezing  acetaminophen   Tablet. 650milliGRAM(s) Oral every 6 hours PRN Moderate Pain (4 - 6)  aluminum hydroxide/magnesium hydroxide/simethicone Suspension 30milliLiter(s) Oral every 6 hours PRN Dyspepsia  metoprolol Injectable 5milliGRAM(s) IV Push every 15 minutes PRN for HR greater than 120 sustained a-fib  oxyCODONE  5 mG/acetaminophen 325 mG 2Tablet(s) Oral every 6 hours PRN Severe Pain (7 - 10)

## 2017-04-01 NOTE — PROGRESS NOTE ADULT - PROBLEM SELECTOR PLAN 1
S/P cardiac arrest with successful resuscitation 3/17/2017, hx of CAD and chronic diastolic HF now with reduced EF: 30-35%, s/p R cardiac cath 3/27/17 with elevated CWP   -Cardiology follow up noted and appreciated will c/w day to day diuresis with IV lasix per dr. bearden  -Possible L heart cath monday if cleared by nephrology.   -Output from yesterday - neg balance of 170mL   -c/w coreg, aspirin, lipitor  -Daily weight  -Strict I/O  -low salt diet  -f/u bmp/bnp in am  CXR - overall improved aeration. small effusion

## 2017-04-01 NOTE — PROGRESS NOTE ADULT - SUBJECTIVE AND OBJECTIVE BOX
Patient seen and examined    Feels fine; OOB/Ch  no c/o CP SOB NV   mild swelling feet    Vital Signs Last 24 Hrs  T(C): 36.7, Max: 36.8 (03-31 @ 21:52)  T(F): 98, Max: 98.2 (03-31 @ 21:52)  HR: 82 (80 - 87)  BP: 106/64 (106/64 - 140/73)  BP(mean): --  RR: 12 (12 - 18)  SpO2: 100% (99% - 100%)    Awake/alert; NAD  chest Clear  No JVD  No murmer  abd soft, NT BS +  + edema      01 Apr 2017 07:32    138    |  91     |  74.0   ----------------------------<  172    4.6     |  34.0   |  2.60     Ca    9.3        01 Apr 2017 07:32  Mg     1.9       31 Mar 2017 06:12                            9.5    8.8   )-----------( 170      ( 01 Apr 2017 07:32 )             30.1       Impression  patient stable    Continue same treatment

## 2017-04-02 NOTE — PROGRESS NOTE ADULT - SUBJECTIVE AND OBJECTIVE BOX
CC: Patient oob in chair, denied any complaints.    HPI:  68F whose name in Christina Cardenas, [facility] of DM, HTN, CRF, CHF (diastolic). Found lethargic at home this morning by her son. Her BG was 22, the son attempted to give her juice,, but she collapsed. EMS arrived and she was found in PEA. Resuscitation was undertaken, after several rounds of Epinephrine there was ROSC. Upon arrival to the ER she again developed PEA, resuscitation was resumed and again there was ROSC,  however she was in shock and placed on Levophed. Initial ABG showed a severe respiratory acidosis. She was on full vent support and settings were adjusted. She was spontaneously breathing but had no spontaneous movement and was not responding to her name, so the modified hypothermia protocol was initiated. EKG showed no acute St/T changes. In speaking with the patient's son, she had been complaining of general lethargy and weakness. No recent chest pain, cough fever. (17 Mar 2017 11:27)    REVIEW OF SYSTEMS:    Patient denied fever, chills, abdominal pain, nausea, vomiting, cough, shortness of breath, chest pain or palpitations    Vital Signs Last 24 Hrs  T(C): 36.6, Max: 37 (04-01 @ 21:37)  T(F): 97.8, Max: 98.6 (04-01 @ 21:37)  HR: 78 (75 - 85)  BP: 118/60 (106/64 - 132/60)  BP(mean): --  RR: 18 (12 - 18)  SpO2: 95% (95% - 100%)I&O's Summary    I & Os for current day (as of 02 Apr 2017 12:51)  =============================================  IN: 0 ml / OUT: 350 ml / NET: -350 ml  CAPILLARY BLOOD GLUCOSE  250 (02 Apr 2017 08:00)  178 (01 Apr 2017 21:37)  229 (01 Apr 2017 17:34)  168 (01 Apr 2017 13:33)    PHYSICAL EXAM:  GENERAL: NAD, well-groomed  HEENT: PERRL, +EOMI, anicteric, + [place]  NECK: Supple, No JVD   CHEST/LUNG: CTA bilaterally; Normal effort  HEART: S1S2 decreased intensity, + 2/6SEM noted, gallops or rubs noted  ABDOMEN: Soft, BS Normoactive, NT, ND, no HSM noted  EXTREMITIES:  + radial and DP pulses noted, no clubbing, cyanosis, noted; + Ian LE edema, limited ROM  SKIN: No rashes or lesions noted  NEURO: A&Ox3, no focal deficits noted, CN II-XII intact  PSYCH: normal mood and affect; insight/judgement fair    LABS:                        9.3    8.0   )-----------( 179      ( 02 Apr 2017 06:42 )             29.2     02 Apr 2017 06:42    138    |  91     |  74.0   ----------------------------<  201    4.7     |  38.0   |  2.59     Ca    9.2        02 Apr 2017 06:42  Phos  4.3       02 Apr 2017 06:42  Mg     2.0       02 Apr 2017 06:42      PT/INR - ( 02 Apr 2017 06:42 )   PT: 16.1 sec;   INR: 1.45 ratio         PTT - ( 02 Apr 2017 06:42 )  PTT:34.1 sec    RADIOLOGY & ADDITIONAL TESTS:    MEDICATIONS:  MEDICATIONS  (STANDING):  dextrose 50% Injectable 12.5Gram(s) IV Push once  dextrose 50% Injectable 25Gram(s) IV Push once  dextrose 50% Injectable 25Gram(s) IV Push once  aspirin  chewable 81milliGRAM(s) Oral daily  atorvastatin 20milliGRAM(s) Oral at bedtime  insulin lispro (HumaLOG) corrective regimen sliding scale  SubCutaneous Before meals and at bedtime  ALBUTerol/ipratropium for Nebulization 3milliLiter(s) Nebulizer every 6 hours  lidocaine   Patch 1Patch Transdermal daily  pantoprazole    Tablet 40milliGRAM(s) Oral before breakfast  famotidine    Tablet 20milliGRAM(s) Oral at bedtime  docusate sodium 100milliGRAM(s) Oral three times a day  senna 2Tablet(s) Oral at bedtime  carvedilol 6.25milliGRAM(s) Oral every 12 hours  dextrose 5%. 1000milliLiter(s) IV Continuous <Continuous>  guaiFENesin    Syrup 100milliGRAM(s) Oral every 4 hours  enoxaparin Injectable 70milliGRAM(s) SubCutaneous daily  insulin glargine Injectable (LANTUS) 13Unit(s) SubCutaneous at bedtime  insulin lispro Injectable (HumaLOG) 8Unit(s) SubCutaneous three times a day before meals  epoetin conrado Injectable [id number](s) SubCutaneous <User Schedule>  sodium biphosphate Rectal Enema 1Enema Rectal once  hydrALAZINE 25milliGRAM(s) Oral every 8 hours  isosorbide   mononitrate ER Tablet (IMDUR) 30milliGRAM(s) Oral daily  furosemide   Injectable 40milliGRAM(s) IV Push daily    MEDICATIONS  (PRN):  dextrose Gel 1Dose(s) Oral once PRN Blood Glucose LESS THAN 70 milliGRAM(s)/deciLiter  glucagon  Injectable 1milliGRAM(s) IntraMuscular once PRN Glucose <70 milliGRAM(s)/deciLiter  ALBUTerol    0.083% 2.5milliGRAM(s) Nebulizer every 3 hours PRN Shortness of Breath and/or Wheezing  acetaminophen   Tablet. 650milliGRAM(s) Oral every 6 hours PRN Moderate Pain (4 - 6)  aluminum hydroxide/magnesium hydroxide/simethicone Suspension 30milliLiter(s) Oral every 6 hours PRN Dyspepsia  metoprolol Injectable 5milliGRAM(s) IV Push every 15 minutes PRN for HR greater than 120 sustained a-fib  oxyCODONE  5 mG/acetaminophen 325 mG 2Tablet(s) Oral every 6 hours PRN Severe Pain (7 - 10)

## 2017-04-02 NOTE — PROGRESS NOTE ADULT - SUBJECTIVE AND OBJECTIVE BOX
Patient seen and examined    Feels fine  no c/o CP SOB NV HA WKness  has swelling feet    Vital Signs Last 24 Hrs  T(C): 36.6, Max: 37 (04-01 @ 21:37)  T(F): 97.8, Max: 98.6 (04-01 @ 21:37)  HR: 89 (75 - 92)  BP: 102/62 (102/62 - 132/60)  BP(mean): --  RR: 20 (16 - 20)  SpO2: 99% (95% - 100%)    Awake/alert; NAD  chest Clear  No JVD  gr 2 murmer  abd soft, NT BS +  + edema      02 Apr 2017 06:42    138    |  91     |  74.0   ----------------------------<  201    4.7     |  38.0   |  2.59     Ca    9.2        02 Apr 2017 06:42  Phos  4.3       02 Apr 2017 06:42  Mg     2.0       02 Apr 2017 06:42                            9.3    8.0   )-----------( 179      ( 02 Apr 2017 06:42 )             29.2       Impression  patient stable; for cardiac cath tomorrow  at very high risk for worsening renal function from OCTAVIO d/t CKD - 3 and DM  called and d/w son and patient at length and Dr. Espinosa as well  All risks and option of no cath expld  Limited role of measures also explained  both agree separately for cath  shall give Mucomyst and IVF with some NaHCO3 as CO2 already 38    Continue same treatment

## 2017-04-02 NOTE — PROGRESS NOTE ADULT - PROBLEM SELECTOR PLAN 1
S/P cardiac arrest with successful resuscitation 3/17/2017, hx of CAD and chronic diastolic HF now with reduced EF: 30-35%, s/p R cardiac cath 3/27/17 with elevated CWP   -Cardiology follow up noted and appreciated will c/w day to day diuresis with IV lasix per dr. bearden - change to PO soon  -Possible L heart cath tomorrow.  NPO tonight at [place]. awaiting call-back from renal for follow up as well  -c/w coreg, aspirin, lipitor  -Daily weight  -Strict I/O  -low salt diet  -f/u bmp in am

## 2017-04-02 NOTE — PROGRESS NOTE ADULT - PROBLEM SELECTOR PLAN 8
DVT PPx
DVT PPx - on lovenox tx doses already
DVT PPx - on lovenox tx doses already
Follow renal function  Renal f/u
Follow renal function  Renal f/u
DVT PPx

## 2017-04-02 NOTE — PROGRESS NOTE ADULT - PROBLEM SELECTOR PLAN 6
Improving control   HBA1C:8.9   -c/w accdevin ACHS   -endocrinology f/u noted and appreciated   -will increase lantus from 13units to 16 starting tonight   -c/w humalog to 8units premeal and low dose HSS   -off steroids now

## 2017-04-02 NOTE — PROGRESS NOTE ADULT - PROBLEM SELECTOR PLAN 2
- stable over last few days  ARF due to ATN and pre renal azotemia, CRF(late III-early IV)   -will c/w monitoring bun/cr    -Medications for fluid overload as stated above   -c/w renal restriction and avoid nephrotoxic medications

## 2017-04-02 NOTE — PROGRESS NOTE ADULT - PROBLEM SELECTOR PROBLEM 8
Prophylactic measure
SENTHIL (acute kidney injury)
SENTHIL (acute kidney injury)

## 2017-04-02 NOTE — PROGRESS NOTE ADULT - ASSESSMENT
76 F admitted s/p cardiac arrest, likely secondary to hypoglycemia induced hypercapnia (acute on chronic) from stupor causing increased myocardial demand. ROSC with return of mental status, therefore no modified hypothermia, intubated 3/17 extubated 3/18, was on dobutamine infusion for cardiogenic shock. Pt was transferred to medical service on 3/19/17 and continues to be followed by cardiology and nephrology. Patient was optimized for cardiac catheterization and patient/son wanted to pursue cardiac catheterization knowing risks involved with further decline in renal function and possible need for dialysis. CRF(late III-early IV) and ARF due to ATN and pre renal azotemia. Pt s/p R heart cath 3/27/17, L heart cath unable to be performed which showed elevated CWP. Pt continues to be diuresed on a daily basis depending on renal function. Possible L heart cath tomorrow.

## 2017-04-02 NOTE — PROGRESS NOTE ADULT - PROBLEM SELECTOR PLAN 5
PAF   -c/w coreg for rate control  -cardiology f/u noted and appreciated   -c/w lovenox renally adjusted   - coumadin on hold for left heart cath scheduled on tomorrow

## 2017-04-02 NOTE — PROGRESS NOTE ADULT - SUBJECTIVE AND OBJECTIVE BOX
[facility], [facility]                                                    [address] , [address], [place] [address]                                                         PHONE: [phone]    FAX: [phone] [address], [address], [place] [address]                                                 PHONE: [phone]    FAX: [phone]  *******************************************************************************    Overnight events/Subjective Assessment:  No chest pain or palpitations, breathing stable, + orthopnea    IV Contrast (Unknown)    MEDICATIONS  (STANDING):  dextrose 50% Injectable 12.5Gram(s) IV Push once  dextrose 50% Injectable 25Gram(s) IV Push once  dextrose 50% Injectable 25Gram(s) IV Push once  aspirin  chewable 81milliGRAM(s) Oral daily  atorvastatin 20milliGRAM(s) Oral at bedtime  insulin lispro (HumaLOG) corrective regimen sliding scale  SubCutaneous Before meals and at bedtime  ALBUTerol/ipratropium for Nebulization 3milliLiter(s) Nebulizer every 6 hours  lidocaine   Patch 1Patch Transdermal daily  pantoprazole    Tablet 40milliGRAM(s) Oral before breakfast  famotidine    Tablet 20milliGRAM(s) Oral at bedtime  docusate sodium 100milliGRAM(s) Oral three times a day  senna 2Tablet(s) Oral at bedtime  carvedilol 6.25milliGRAM(s) Oral every 12 hours  dextrose 5%. 1000milliLiter(s) IV Continuous <Continuous>  guaiFENesin    Syrup 100milliGRAM(s) Oral every 4 hours  enoxaparin Injectable 70milliGRAM(s) SubCutaneous daily  insulin glargine Injectable (LANTUS) 13Unit(s) SubCutaneous at bedtime  insulin lispro Injectable (HumaLOG) 8Unit(s) SubCutaneous three times a day before meals  epoetin conrado Injectable [id number](s) SubCutaneous <User Schedule>  sodium biphosphate Rectal Enema 1Enema Rectal once  hydrALAZINE 25milliGRAM(s) Oral every 8 hours    MEDICATIONS  (PRN):  dextrose Gel 1Dose(s) Oral once PRN Blood Glucose LESS THAN 70 milliGRAM(s)/deciLiter  glucagon  Injectable 1milliGRAM(s) IntraMuscular once PRN Glucose <70 milliGRAM(s)/deciLiter  ALBUTerol    0.083% 2.5milliGRAM(s) Nebulizer every 3 hours PRN Shortness of Breath and/or Wheezing  acetaminophen   Tablet. 650milliGRAM(s) Oral every 6 hours PRN Moderate Pain (4 - 6)  aluminum hydroxide/magnesium hydroxide/simethicone Suspension 30milliLiter(s) Oral every 6 hours PRN Dyspepsia  metoprolol Injectable 5milliGRAM(s) IV Push every 15 minutes PRN for HR greater than 120 sustained a-fib  oxyCODONE  5 mG/acetaminophen 325 mG 2Tablet(s) Oral every 6 hours PRN Severe Pain (7 - 10)      Vital Signs Last 24 Hrs  T(C): 36.7, Max: 37.1 (03-31 @ 12:24)  T(F): 98.1, Max: 98.7 (03-31 @ 12:24)  HR: 87 (80 - 87)  BP: 116/59 (100/62 - 140/73)  BP(mean): --  RR: 18 (16 - 18)  SpO2: 99% (95% - 100%)    I&O's Detail    I & Os for current day (as of 01 Apr 2017 11:25)  =============================================  IN:    Oral Fluid: 1055 ml    Total IN: 1055 ml  ---------------------------------------------  OUT:    Voided: 1225 ml    Total OUT: 1225 ml  ---------------------------------------------  Total NET: -170 ml    I&O's Summary    I & Os for current day (as of 01 Apr 2017 11:25)  =============================================  IN: 1055 ml / OUT: 1225 ml / NET: -170 ml          PHYSICAL EXAM:  General: Appears well developed, well nourished, no acute distress  HEENT: Head: normocephalic, atraumatic  Eyes: Pupils equal and reactive  Neck: Supple, no carotid bruit, no JVD, no HJR  CARDIOVASCULAR: Normal S1 and S2, no murmur, rub, or gallop  LUNGS: Decreased breath sounds bilaterally  ABDOMEN: Soft, nontender, non-distended, positive bowel sounds, no mass or bruit  EXTREMITIES: 2+ B/L LE pitting edema, 1+ DPs  SKIN: Warm and dry with normal turgor  NEURO: Alert & oriented x 3, grossly intact  PSYCH: normal mood and affect        LABS:                        9.3    8.0   )-----------( 179      ( 02 Apr 2017 06:42 )             29.2     02 Apr 2017 06:42    138    |  91     |  74.0   ----------------------------<  201    4.7     |  38.0   |  2.59     Ca    9.2        02 Apr 2017 06:42  Phos  4.3       02 Apr 2017 06:42  Mg     2.0       02 Apr 2017 06:42        RADIOLOGY & ADDITIONAL STUDIES:    TELEMETRY: NSR 70-80s, PVCs    ECHO (3/17/17):  PHYSICIAN INTERPRETATION:  Left Ventricle: The left ventricular internal cavity size is normal.  Global LV systolic function was moderately to severely decreased. Left   ventricular ejection fraction, by visual estimation, is 30 to 35%.  LV Wall Scoring:  The basal and mid anterior septum, basal and mid inferior septum, mid  anterolateral segment, and basal inferior segment are hypokinetic.  Right Ventricle: The right ventricular size is normal. RV systolic   function is normal. TV S' 0.1 m/s.  Left Atrium: Normal left atrial size. Intra-atrial septal aneurysm.  Right Atrium: The right atrium is normal in size.  Pericardium: Trivial pericardial effusion is present. The pericardial   effusion is anterior to the right ventricle.  Mitral Valve: Thickening of the anterior and posterior mitral valve   leaflets. There is moderate mitral annular calcification. Mild mitral   valve regurgitation is seen.  Tricuspid Valve: Trivial tricuspid regurgitation is visualized. Estimated   pulmonary artery systolic pressure is 44.3 mmHg assuming a right atrial   pressure of 10 mmHg, which is consistent with mild pulmonary hypertension.  Aortic Valve: The aortic valve is trileaflet. Sclerotic aortic valve with   normal opening. No evidence of aortic valve regurgitation is seen.  Pulmonic Valve: The pulmonic valve was not well visualized.  Aorta: The aortic root is normal in size and structure.  Venous: A normal flow pattern is recorded from the right upper pulmonary   vein. Patient on Mechanical ventilation unable to assess Right Atrial   pressure.  Summary:   1. Left ventricular ejection fraction, by visual estimation, is 30 to 35%.   2. Technically adequate study.   3. Moderately to severely decreased global left ventricular systolic function.   4. Basal and mid anterior septum, basal and mid inferior septum, mid   anterolateral segment, and basal inferior segment are abnormal as   described above.   5. Normal left ventricular internal cavity size.   6. Trivial pericardial effusion.   7. Moderate mitral annular calcification.   8. Thickening of the anterior and posterior mitral valve leaflets.   9. Sclerotic aortic valve with normal opening.  10. Estimated pulmonary artery systolic pressure is 44.3 mmHg assuming a   right atrial pressure of 10 mmHg, which is consistent with mild pulmonary hypertension.  11. Intra-atrial septal aneurysm.      ASSESSMENT AND PLAN:  In summary, SOCORRO JARVIS is a 76F a/w cardiac arrest, acute systolic CHF exacerbation, ARF/CRI, mild troponin increase (peak 0.4), CM (EF 30-35%), s/p RHC 3/27/17->severe pulmonary HTN/PCWP 30, s/p PAF->NSR, IDDM, HTN, HL, asthma  - Given new severe CM and cardiac arrest, plan for cardiac catheterization once optimized to r/o obstructive CAD.  Patient was unable to have C 3/27 because unable to lie flat.  Cardiac cath tentatively scheduled for tomorrow 4/3 with Dr. Dillard if stable and cleared by nephrology.  Will make NPO past midnight & hold Lovenox in AM 4/3 if cath to be performed.  - Echocardiogram 3/17/17 demonstrated moderately to severely decreased LV fxn (EF 30-35%), + SWMA, mild MR, trace TR, mild pulmonary HTN (RVSP 44.3 mmHg), ASA, trivial pericardial effusion  - IV diuresis in place with Lasix 40 mg IV daily for now, renal function relatively stable.  Monitor strict I/Os, daily weights & BUN/Cr closely and titrate PRN.  Nephrology follow-up.  - Rhythm/hemodynamics stable = continue current doses of Coreg 6.25 bid, Hydralazine 25 tid & Imdur 30 daily for now and titrate PRN  - ASA 81 & Lipitor 20 daily in place  - Patient had PAF now in NSR with an increased [id number] risk score.  She is currently on AC with Lovenox renally dosed 1 mg/kg SQ daily.  Hold Coumadin for now given plan for cardiac catheterization.    - Glycemic control per endocrine    Angel Patterson MD

## 2017-04-03 NOTE — PROGRESS NOTE ADULT - SUBJECTIVE AND OBJECTIVE BOX
INTERVAL HPI/OVERNIGHT EVENTS: no events, currently NPO for cardiac cath today. no complaints. glucoses high today - she did not receive Lantus dose last night due to NPO status    MEDICATIONS  (STANDING):  dextrose 50% Injectable 12.5Gram(s) IV Push once  dextrose 50% Injectable 25Gram(s) IV Push once  dextrose 50% Injectable 25Gram(s) IV Push once  aspirin  chewable 81milliGRAM(s) Oral daily  atorvastatin 20milliGRAM(s) Oral at bedtime  insulin lispro (HumaLOG) corrective regimen sliding scale  SubCutaneous Before meals and at bedtime  ALBUTerol/ipratropium for Nebulization 3milliLiter(s) Nebulizer every 6 hours  lidocaine   Patch 1Patch Transdermal daily  pantoprazole    Tablet 40milliGRAM(s) Oral before breakfast  famotidine    Tablet 20milliGRAM(s) Oral at bedtime  docusate sodium 100milliGRAM(s) Oral three times a day  senna 2Tablet(s) Oral at bedtime  carvedilol 6.25milliGRAM(s) Oral every 12 hours  dextrose 5%. 1000milliLiter(s) IV Continuous <Continuous>  guaiFENesin    Syrup 100milliGRAM(s) Oral every 4 hours  insulin glargine Injectable (LANTUS) 13Unit(s) SubCutaneous at bedtime  insulin lispro Injectable (HumaLOG) 8Unit(s) SubCutaneous three times a day before meals  epoetin conrado Injectable [id number](s) SubCutaneous <User Schedule>  sodium biphosphate Rectal Enema 1Enema Rectal once  hydrALAZINE 25milliGRAM(s) Oral every 8 hours  isosorbide   mononitrate ER Tablet (IMDUR) 30milliGRAM(s) Oral daily  acetylcysteine  Oral Solution 1200milliGRAM(s) Oral two times a day  furosemide   Injectable 80milliGRAM(s) IV Push two times a day  dextrose 5% + sodium chloride 0.9%. 1000milliLiter(s) IV Continuous <Continuous>  metolazone 2.5milliGRAM(s) Oral daily    MEDICATIONS  (PRN):  dextrose Gel 1Dose(s) Oral once PRN Blood Glucose LESS THAN 70 milliGRAM(s)/deciLiter  glucagon  Injectable 1milliGRAM(s) IntraMuscular once PRN Glucose <70 milliGRAM(s)/deciLiter  ALBUTerol    0.083% 2.5milliGRAM(s) Nebulizer every 3 hours PRN Shortness of Breath and/or Wheezing  acetaminophen   Tablet. 650milliGRAM(s) Oral every 6 hours PRN Moderate Pain (4 - 6)  aluminum hydroxide/magnesium hydroxide/simethicone Suspension 30milliLiter(s) Oral every 6 hours PRN Dyspepsia  metoprolol Injectable 5milliGRAM(s) IV Push every 15 minutes PRN for HR greater than 120 sustained a-fib  oxyCODONE  5 mG/acetaminophen 325 mG 2Tablet(s) Oral every 6 hours PRN Severe Pain (7 - 10)      Allergies: IV Contrast (Unknown)    Review of systems:  Denies fever/chills/abd pain/nausea/vomiting/diarrhea/constipation/chest pain/palp/SOB    Vital Signs Last 24 Hrs  T(C): 37.1, Max: 37.1 (04-02 @ 15:00)  T(F): 98.7, Max: 98.7 (04-02 @ 15:00)  HR: 88 (78 - 91)  BP: 125/58 (98/556 - 125/58)  BP(mean): --  RR: 16 (16 - 24)  SpO2: 98% (93% - 100%)    PHYSICAL EXAM:  Constitutional: NAD, well-groomed, well-developed  HEENT: EOMI, pupil equal  Respiratory: CTAB no w/r/r  Cardiovascular: S1 and S2, RRR, no M/G/R  Gastrointestinal: obese, BS+, soft, no organomegaly or mass  Extremities: Bilateral edema  Vascular: 2+ peripheral pulses  Neurological: A/O x 3, no focal deficits  Psychiatric: Normal mood, normal affect  Skin: No rashes, no acanthosis        LABS:                        9.3    8.0   )-----------( 179      ( 02 Apr 2017 06:42 )             29.2     03 Apr 2017 07:25    138    |  90     |  76.0   ----------------------------<  355    4.8     |  34.0   |  2.95     Ca    8.8        03 Apr 2017 07:25  Phos  4.3       02 Apr 2017 06:42  Mg     2.0       02 Apr 2017 06:42            CAPILLARY BLOOD GLUCOSE  403 (03 Apr 2017 12:13)  358 (03 Apr 2017 07:56)  181 (02 Apr 2017 20:00)  173 (02 Apr 2017 16:49)  188 (02 Apr 2017 11:45)  250 (02 Apr 2017 08:00)  178 (01 Apr 2017 21:37)  229 (01 Apr 2017 17:34)  168 (01 Apr 2017 13:33)  156 (01 Apr 2017 09:49)  125 (31 Mar 2017 21:52)  110 (31 Mar 2017 17:00)  271 (31 Mar 2017 13:05)  236 (31 Mar 2017 07:53)  160 (30 Mar 2017 22:26)  126 (30 Mar 2017 17:08)      RADIOLOGY & ADDITIONAL TESTS:

## 2017-04-03 NOTE — PROGRESS NOTE ADULT - PROBLEM SELECTOR PLAN 1
S/P cardiac arrest with successful resuscitation. Given new drop in EF, plan for cardiac cath today.

## 2017-04-03 NOTE — PROGRESS NOTE ADULT - SUBJECTIVE AND OBJECTIVE BOX
76y Female who had a [facility] see full report, no intervention, RFA with #4fr sheath manual pull by RN without complications. Patient awake and alert without complaints. Denies chest pain, sob, palps.      Neuro: A&OX3  Lungs: CTA B/L  CV: S1, S2, no murmur, RRR  Abd: Soft  Right Groin: Soft, no bleeding, no hematoma  Extremity: + distal pulses      A/P: 76y Female s/p LHC no intervention, medical management  1. Groin management discussed with patient  2. Continue current meds  3. Follow up as an outpatient with cardiologist  4. Bedrest x 2 hours  5. Follow up groin check in am

## 2017-04-03 NOTE — DISCHARGE NOTE ADULT - NS AS ACTIVITY OBS
Showering allowed/Do not drive or operate machinery/No Heavy lifting/straining/Walking-Outdoors allowed/Walking-Indoors allowed

## 2017-04-03 NOTE — PROGRESS NOTE ADULT - SUBJECTIVE AND OBJECTIVE BOX
Diagnostic coronary angiography completed using 14cc of Visipaque contrast  Pretreatment for IV contrast allergy given    DIAGNOSTIC IMPRESSIONS:   - Severe 2 vessel CAD  - The RCA is occluded chronically with septal-septal collaterals from the LAD  - The Ramus serves a medium-large territory, but is heavily calcifed with a complex stenosis  - The LAD is patent without significant stenosis  - LVEDP elevated at 27mmHg      DIAGNOSTIC RECOMMENDATIONS:   - Aggressive medical management and risk factor modification  - Diuresis still needed  - If worsening renal function with diuresis, consider inotropes (i.e. Primacor)  - Monitor renal function closely  - Medical management of severe ischemic cardiomyopathy for now  - Attempts at revascularization (via PCI or CABG) come at very high risk of needing dialysis    Discussed with patient and her son.    Kavon Dillard MD

## 2017-04-03 NOTE — DISCHARGE NOTE ADULT - CARE PLAN
Instructions for follow-up, activity and diet:	No heavy lifting, driving, sex, tub baths, swimming, or any activity that submerges the lower half of the body in water for 48 hours.  Limited walking and stairs for 48 hours.    Observe the site frequently.  If bleeding or a large lump (the size of a golf ball or bigger) occurs lie flat, apply continuous direct pressure just above the puncture site for at least 10 minutes, and notify your physician immediately.  If the bleeding cannot be controlled, call 911 immediately for assistance.  Notify your physician of pain, swelling or any drainage.    Notify your physician immediately if coldness, numbness, discoloration or pain in your foot occurs.

## 2017-04-03 NOTE — PROGRESS NOTE ADULT - ASSESSMENT
76 year old female with T2DM a/w severe hypoglycemia, s/p cardiac arrest, acute systolic CHF exacerbation, ARF/CRI - planning for cardiac cath today.  She is NPO and did not get Lantus last night and glucoses very high today - received Humalog 2+4 units this afternoon.  - suggest while NPO - humalog coverage moderate dose scale every 4 hours  - resume Lantus tonight - can be given even if NPO, only standing Humalog coverage should be held while NPO  - resume prandial Humalog coverage when eating meals  - will follow

## 2017-04-03 NOTE — DISCHARGE NOTE ADULT - PATIENT PORTAL LINK FT
“You can access the Follow[facility] Patient Portal, offered by [facility], by registering with the following website: [email or web address]”

## 2017-04-03 NOTE — PROGRESS NOTE ADULT - ASSESSMENT
76 F admitted s/p cardiac arrest, likely secondary to hypoglycemia induced hypercapnia (acute on chronic) from stupor causing increased myocardial demand. ROSC with return of mental status, therefore no modified hypothermia, intubated 3/17 extubated 3/18, was on dobutamine infusion for cardiogenic shock. Pt was transferred to medical service on 3/19/17 and followed by cardiology and nephrology. Patient was optimized for cardiac catheterization and patient/son wanted to pursue cardiac catheterization knowing risks involved with further decline in renal function and possible need for dialysis. CRF(late III-early IV) and ARF due to ATN and pre renal azotemia. Pt s/p R heart cath 3/27/17, L heart cath unable to be performed as she could not lay flat. She is on diuresis based on renal function. Cardiac cath scheduled for 4/3

## 2017-04-03 NOTE — PROGRESS NOTE ADULT - SUBJECTIVE AND OBJECTIVE BOX
[facility], [facility]                                                    [address], [address], [place] [address]                                                         PHONE: [phone]    FAX: [phone] [address], [address], [place] [address]                                                 PHONE: [phone]    FAX: [phone]  *******************************************************************************    Overnight events/Subjective Assessment: No chest pain or palpitations, breathing stable, + orthopnea    INTERPRETATION OF TELEMETRY (personally reviewed): SR in 80s    IV Contrast (Unknown)    MEDICATIONS  (STANDING):  dextrose 50% Injectable 12.5Gram(s) IV Push once  dextrose 50% Injectable 25Gram(s) IV Push once  dextrose 50% Injectable 25Gram(s) IV Push once  aspirin  chewable 81milliGRAM(s) Oral daily  atorvastatin 20milliGRAM(s) Oral at bedtime  insulin lispro (HumaLOG) corrective regimen sliding scale  SubCutaneous Before meals and at bedtime  ALBUTerol/ipratropium for Nebulization 3milliLiter(s) Nebulizer every 6 hours  lidocaine   Patch 1Patch Transdermal daily  pantoprazole    Tablet 40milliGRAM(s) Oral before breakfast  famotidine    Tablet 20milliGRAM(s) Oral at bedtime  docusate sodium 100milliGRAM(s) Oral three times a day  senna 2Tablet(s) Oral at bedtime  carvedilol 6.25milliGRAM(s) Oral every 12 hours  dextrose 5%. 1000milliLiter(s) IV Continuous <Continuous>  guaiFENesin    Syrup 100milliGRAM(s) Oral every 4 hours  insulin glargine Injectable (LANTUS) 13Unit(s) SubCutaneous at bedtime  insulin lispro Injectable (HumaLOG) 8Unit(s) SubCutaneous three times a day before meals  epoetin conrado Injectable [id number](s) SubCutaneous <User Schedule>  sodium biphosphate Rectal Enema 1Enema Rectal once  hydrALAZINE 25milliGRAM(s) Oral every 8 hours  isosorbide   mononitrate ER Tablet (IMDUR) 30milliGRAM(s) Oral daily  furosemide   Injectable 40milliGRAM(s) IV Push daily  acetylcysteine  Oral Solution 1200milliGRAM(s) Oral two times a day  sodium chloride 0.45% 1000milliLiter(s) IV Continuous <Continuous>    MEDICATIONS  (PRN):  dextrose Gel 1Dose(s) Oral once PRN Blood Glucose LESS THAN 70 milliGRAM(s)/deciLiter  glucagon  Injectable 1milliGRAM(s) IntraMuscular once PRN Glucose <70 milliGRAM(s)/deciLiter  ALBUTerol    0.083% 2.5milliGRAM(s) Nebulizer every 3 hours PRN Shortness of Breath and/or Wheezing  acetaminophen   Tablet. 650milliGRAM(s) Oral every 6 hours PRN Moderate Pain (4 - 6)  aluminum hydroxide/magnesium hydroxide/simethicone Suspension 30milliLiter(s) Oral every 6 hours PRN Dyspepsia  metoprolol Injectable 5milliGRAM(s) IV Push every 15 minutes PRN for HR greater than 120 sustained a-fib  oxyCODONE  5 mG/acetaminophen 325 mG 2Tablet(s) Oral every 6 hours PRN Severe Pain (7 - 10)      Vital Signs Last 24 Hrs  T(C): 36.8, Max: 37.1 (04-02 @ 15:00)  T(F): 98.3, Max: 98.7 (04-02 @ 15:00)  HR: 82 (78 - 92)  BP: 119/58 (98/556 - 120/62)  BP(mean): --  RR: 18 (18 - 24)  SpO2: 93% (93% - 100%)    I&O's Detail    I & Os for current day (as of 03 Apr 2017 09:39)  =============================================  IN:    Total IN: 0 ml  ---------------------------------------------  OUT:    Voided: 1200 ml    Total OUT: 1200 ml  ---------------------------------------------  Total NET: -1200 ml    I&O's Summary    I & Os for current day (as of 03 Apr 2017 09:39)  =============================================  IN: 0 ml / OUT: 1200 ml / NET: -1200 ml          PHYSICAL EXAM:  General: Appears well developed, well nourished, no acute distress  HEENT: Head: normocephalic, atraumatic  Eyes: Pupils equal and reactive  Neck: Supple, no carotid bruit, no JVD, no HJR  CARDIOVASCULAR: Normal S1 and S2, SM 1/6 at LSB, no rub, or gallop  LUNGS: bilateral scattered rales, no rhonchi or wheeze  ABDOMEN: Soft, nontender, non-distended, positive bowel sounds, no mass or bruit  EXTREMITIES: b/l 2+ edema, distal pulses WNL  SKIN: Warm and dry with normal turgor  NEURO: Alert & oriented x 3, grossly intact  PSYCH: normal mood and affect        LABS:                        9.3    8.0   )-----------( 179      ( 02 Apr 2017 06:42 )             29.2     03 Apr 2017 07:25    138    |  90     |  76.0   ----------------------------<  355    4.8     |  34.0   |  2.95     Ca    8.8        03 Apr 2017 07:25  Phos  4.3       02 Apr 2017 06:42  Mg     2.0       02 Apr 2017 06:42          PT/INR - ( 03 Apr 2017 07:25 )   PT: 15.3 sec;   INR: 1.38 ratio         PTT - ( 03 Apr 2017 07:25 )  PTT:32.0 sec  Serum Pro-Brain Natriuretic Peptide: 02831 pg/mL (04-01 @ 07:32)  serum  Lipids:   Hemoglobin A1C, Whole Blood: 8.9 % (03-18 @ 06:25)        ASSESSMENT AND PLAN:  In summary, SOCORRO JARVIS is a 76y Female with past medical history significant for a/w cardiac arrest, acute systolic CHF exacerbation, ARF/CRI, mild troponin increase (peak 0.4), CM (EF 30-35%), s/p RHC 3/27/17->severe pulmonary HTN/PCWP 30, s/p PAF->NSR, IDDM, HTN, HL, asthma  - Given new severe CM and cardiac arrest, plan for cardiac catheterization once optimized to r/o obstructive CAD.  Patient was unable to have LHC 3/27 because unable to lie flat.  Cardiac cath tentatively scheduled for today with Dr. Dillard if stable and cleared by nephrology.  She is NPO since midnight, hold Lovenox for now with plan for cath to be performed later today .  - Echocardiogram 3/17/17 demonstrated moderately to severely decreased LV fxn (EF 30-35%), + SWMA, mild MR, trace TR, mild pulmonary HTN (RVSP 44.3 mmHg), ASA, trivial pericardial effusion  - IV diuresis in place with Lasix 40 mg IV daily for now, renal function relatively stable.  Monitor strict I/Os, daily weights & BUN/Cr closely and titrate PRN.  Nephrology follow-up.  - Rhythm/hemodynamics stable = continue current doses of Coreg 6.25 bid, Hydralazine 25 tid & Imdur 30 daily for now and titrate PRN  - ASA 81 & Lipitor 20 daily in place  - Patient had PAF now in NSR with an increased [id number] risk score.  She is currently on AC with Lovenox renally dosed 1 mg/kg SQ daily.  Hold Coumadin for now given plan for cardiac catheterization.    - Glycemic control per endocrine

## 2017-04-03 NOTE — DISCHARGE NOTE ADULT - PLAN OF CARE
No heavy lifting, driving, sex, tub baths, swimming, or any activity that submerges the lower half of the body in water for 48 hours.  Limited walking and stairs for 48 hours.    Observe the site frequently.  If bleeding or a large lump (the size of a golf ball or bigger) occurs lie flat, apply continuous direct pressure just above the puncture site for at least 10 minutes, and notify your physician immediately.  If the bleeding cannot be controlled, call 911 immediately for assistance.  Notify your physician of pain, swelling or any drainage.    Notify your physician immediately if coldness, numbness, discoloration or pain in your foot occurs.

## 2017-04-04 NOTE — PROGRESS NOTE ADULT - SUBJECTIVE AND OBJECTIVE BOX
[facility], [facility]                                                    [address] , [address], [place] [address]                                                         PHONE: [phone]    FAX: [phone] [address], [address], [place] [address]                                                 PHONE: [phone]    FAX: [phone]  *******************************************************************************    Overnight events/Subjective Assessment:    INTERPRETATION OF TELEMETRY (personally reviewed):    IV Contrast (Unknown)    MEDICATIONS  (STANDING):  dextrose 50% Injectable 12.5Gram(s) IV Push once  dextrose 50% Injectable 25Gram(s) IV Push once  dextrose 50% Injectable 25Gram(s) IV Push once  aspirin  chewable 81milliGRAM(s) Oral daily  atorvastatin 20milliGRAM(s) Oral at bedtime  insulin lispro (HumaLOG) corrective regimen sliding scale  SubCutaneous Before meals and at bedtime  ALBUTerol/ipratropium for Nebulization 3milliLiter(s) Nebulizer every 6 hours  lidocaine   Patch 1Patch Transdermal daily  pantoprazole    Tablet 40milliGRAM(s) Oral before breakfast  famotidine    Tablet 20milliGRAM(s) Oral at bedtime  docusate sodium 100milliGRAM(s) Oral three times a day  senna 2Tablet(s) Oral at bedtime  carvedilol 6.25milliGRAM(s) Oral every 12 hours  dextrose 5%. 1000milliLiter(s) IV Continuous <Continuous>  guaiFENesin    Syrup 100milliGRAM(s) Oral every 4 hours  insulin glargine Injectable (LANTUS) 13Unit(s) SubCutaneous at bedtime  insulin lispro Injectable (HumaLOG) 8Unit(s) SubCutaneous three times a day before meals  epoetin conrado Injectable [id number](s) SubCutaneous <User Schedule>  sodium biphosphate Rectal Enema 1Enema Rectal once  hydrALAZINE 25milliGRAM(s) Oral every 8 hours  isosorbide   mononitrate ER Tablet (IMDUR) 30milliGRAM(s) Oral daily  furosemide   Injectable 80milliGRAM(s) IV Push two times a day  dextrose 5% + sodium chloride 0.9%. 1000milliLiter(s) IV Continuous <Continuous>  metolazone 2.5milliGRAM(s) Oral daily    MEDICATIONS  (PRN):  dextrose Gel 1Dose(s) Oral once PRN Blood Glucose LESS THAN 70 milliGRAM(s)/deciLiter  glucagon  Injectable 1milliGRAM(s) IntraMuscular once PRN Glucose <70 milliGRAM(s)/deciLiter  ALBUTerol    0.083% 2.5milliGRAM(s) Nebulizer every 3 hours PRN Shortness of Breath and/or Wheezing  acetaminophen   Tablet. 650milliGRAM(s) Oral every 6 hours PRN Moderate Pain (4 - 6)  aluminum hydroxide/magnesium hydroxide/simethicone Suspension 30milliLiter(s) Oral every 6 hours PRN Dyspepsia  metoprolol Injectable 5milliGRAM(s) IV Push every 15 minutes PRN for HR greater than 120 sustained a-fib  oxyCODONE  5 mG/acetaminophen 325 mG 2Tablet(s) Oral every 6 hours PRN Severe Pain (7 - 10)      Vital Signs Last 24 Hrs  T(C): 36.8, Max: 37.1 (04-03 @ 12:13)  T(F): 98.3, Max: 98.7 (04-03 @ 12:13)  HR: 84 (76 - 88)  BP: 133/57 (88/40 - 133/57)  BP(mean): --  RR: 16 (14 - 18)  SpO2: 98% (96% - 100%)    I&O's Detail    I & Os for current day (as of 04 Apr 2017 08:33)  =============================================  IN:    Oral Fluid: 250 ml    sodium chloride 0.45%: 200 ml    Total IN: 450 ml  ---------------------------------------------  OUT:    Voided: 400 ml    Total OUT: 400 ml  ---------------------------------------------  Total NET: 50 ml    I&O's Summary    I & Os for current day (as of 04 Apr 2017 08:33)  =============================================  IN: 450 ml / OUT: 400 ml / NET: 50 ml          PHYSICAL EXAM:  General: Appears well developed, well nourished, no acute distress  HEENT: Head: normocephalic, atraumatic  Eyes: Pupils equal and reactive  Neck: Supple, no carotid bruit, no JVD, no HJR  CARDIOVASCULAR: Normal S1 and S2, no murmur, rub, or gallop  LUNGS: Clear to auscultation bilaterally, no rales, rhonchi or wheeze  ABDOMEN: Soft, nontender, non-distended, positive bowel sounds, no mass or bruit  EXTREMITIES: Mild bilat LE edema, distal pulses WNL  SKIN: Warm and dry with normal turgor  NEURO: Alert & oriented x 3, grossly intact  PSYCH: normal mood and affect        LABS:                        8.8    9.2   )-----------( 194      ( 04 Apr 2017 06:09 )             26.9     04 Apr 2017 06:09    133    |  87     |  85.0   ----------------------------<  335    4.8     |  33.0   |  3.14     Ca    8.9        04 Apr 2017 06:09          PT/INR - ( 04 Apr 2017 06:09 )   PT: 14.5 sec;   INR: 1.31 ratio         PTT - ( 03 Apr 2017 07:25 )  PTT:32.0 sec  Serum Pro-Brain Natriuretic Peptide: 10137 pg/mL (04-01 @ 07:32)  serum  Lipids:   Hemoglobin A1C, Whole Blood: 8.9 % (03-18 @ 06:25)        RADIOLOGY & ADDITIONAL STUDIES:      CARDIAC CATHETERIZATION: DIAGNOSTIC IMPRESSIONS:   - Severe 2 vessel CAD  - The RCA is occluded chronically with septal-septal collaterals from the LAD  - The Ramus serves a medium-large territory, but is heavily calcifed with a complex stenosis  - The LAD is patent without significant stenosis  - LVEDP elevated at 27mmHg    ASSESSMENT AND PLAN:  In summary, SOCORRO JARVIS is a 76y Female with past medical history significant for cardiac arrest, acute systolic CHF exacerbation, ARF/CRI, mild troponin increase (peak 0.4), CM (EF 30-35%), s/p RHC 3/27/17->severe pulmonary HTN/PCWP 30, s/p PAF->NSR, IDDM, HTN, HL, asthma  - New severe CM. 2V CAD. Occluded RCA with collaterals. Ramus serves a medium-large territory, but is heavily calcifed with a complex stenosis. The LAD is patent without significant stenosis.  - LVEDP elevated at 27mmHg (remains elevated despite diuresis. DW renal, Dr Robins ?role of HD in order to remove fluid for possible intervention.  - Echocardiogram 3/17/17 demonstrated moderately to severely decreased LV fxn (EF 30-35%), + SWMA, mild MR, trace TR, mild pulmonary HTN (RVSP 44.3 mmHg), ASA, trivial pericardial effusion  - IV diuresis in place with Lasix 80 mg IV BID for now, renal function relatively stable.  Monitor strict I/Os, daily weights & BUN/Cr closely and titrate PRN.  Nephrology follow-up.  - Rhythm/hemodynamics stable = continue current doses of Coreg 6.25 bid, Hydralazine 25 tid & Imdur 30 daily for now and titrate PRN  - ASA 81 & Lipitor 20 daily in place  - Patient had PAF now in NSR with an increased [id number] risk score.  She is currently on AC with Lovenox renally dosed 1 mg/kg SQ daily.  Hold Coumadin for now given plan for cardiac catheterization.    - Glycemic control per endocrine      Monet Ovalles MD

## 2017-04-04 NOTE — PROGRESS NOTE ADULT - ASSESSMENT
76 F admitted s/p cardiac arrest, likely secondary to hypoglycemia induced hypercapnia (acute on chronic) from stupor causing increased myocardial demand. ROSC with return of mental status, therefore no modified hypothermia, intubated 3/17 extubated 3/18, was on dobutamine infusion for cardiogenic shock. Pt was transferred to medical service on 3/19/17 and followed by cardiology and nephrology. Patient was optimized for cardiac catheterization and patient/son wanted to pursue cardiac catheterization knowing risks involved with further decline in renal function and possible need for dialysis. CRF(late III-early IV) and ARF due to ATN and pre renal azotemia. Pt s/p R heart cath 3/27/17, L heart cath unable to be performed as she could not lay flat. She is on diuresis based on renal function. Cardiac cath scheduled for 4/3, showed severe 2 vessel CAD. Renal follow up was done, plan for initiating HD

## 2017-04-04 NOTE — PROGRESS NOTE ADULT - SUBJECTIVE AND OBJECTIVE BOX
NEPHROLOGY INTERVAL HPI/OVERNIGHT EVENTS: No new events    MEDICATIONS  (STANDING):  dextrose 50% Injectable 12.5Gram(s) IV Push once  dextrose 50% Injectable 25Gram(s) IV Push once  dextrose 50% Injectable 25Gram(s) IV Push once  aspirin  chewable 81milliGRAM(s) Oral daily  atorvastatin 20milliGRAM(s) Oral at bedtime  insulin lispro (HumaLOG) corrective regimen sliding scale  SubCutaneous Before meals and at bedtime  ALBUTerol/ipratropium for Nebulization 3milliLiter(s) Nebulizer every 6 hours  lidocaine   Patch 1Patch Transdermal daily  pantoprazole    Tablet 40milliGRAM(s) Oral before breakfast  famotidine    Tablet 20milliGRAM(s) Oral at bedtime  docusate sodium 100milliGRAM(s) Oral three times a day  senna 2Tablet(s) Oral at bedtime  carvedilol 6.25milliGRAM(s) Oral every 12 hours  dextrose 5%. 1000milliLiter(s) IV Continuous <Continuous>  guaiFENesin    Syrup 100milliGRAM(s) Oral every 4 hours  insulin glargine Injectable (LANTUS) 13Unit(s) SubCutaneous at bedtime  insulin lispro Injectable (HumaLOG) 8Unit(s) SubCutaneous three times a day before meals  sodium biphosphate Rectal Enema 1Enema Rectal once  hydrALAZINE 25milliGRAM(s) Oral every 8 hours  isosorbide   mononitrate ER Tablet (IMDUR) 30milliGRAM(s) Oral daily  dextrose 5% + sodium chloride 0.9%. 1000milliLiter(s) IV Continuous <Continuous>  metolazone 2.5milliGRAM(s) Oral daily  epoetin conrado Injectable 14638Owga(s) IV Push once    MEDICATIONS  (PRN):  dextrose Gel 1Dose(s) Oral once PRN Blood Glucose LESS THAN 70 milliGRAM(s)/deciLiter  glucagon  Injectable 1milliGRAM(s) IntraMuscular once PRN Glucose <70 milliGRAM(s)/deciLiter  ALBUTerol    0.083% 2.5milliGRAM(s) Nebulizer every 3 hours PRN Shortness of Breath and/or Wheezing  acetaminophen   Tablet. 650milliGRAM(s) Oral every 6 hours PRN Moderate Pain (4 - 6)  aluminum hydroxide/magnesium hydroxide/simethicone Suspension 30milliLiter(s) Oral every 6 hours PRN Dyspepsia  metoprolol Injectable 5milliGRAM(s) IV Push every 15 minutes PRN for HR greater than 120 sustained a-fib  oxyCODONE  5 mG/acetaminophen 325 mG 2Tablet(s) Oral every 6 hours PRN Severe Pain (7 - 10)      Allergies    IV Contrast (Unknown)    Intolerances        Vital Signs Last 24 Hrs  T(C): 36.8, Max: 37.1 ( @ 12:13)  T(F): 98.3, Max: 98.7 ( @ 12:13)  HR: 84 (76 - 88)  BP: 133/57 (88/40 - 133/57)  BP(mean): --  RR: 16 (14 - 18)  SpO2: 98% (96% - 100%)  Daily     Daily Weight in k.3 (2017 06:29)    PHYSICAL EXAM:    GENERAL: same  HEAD:  wnl  EYES:   ENMT:   NECK: veins distended with pt sitting up  NERVOUS SYSTEM:  same  CHEST/LUNG: base crackles right  HEART: murmur same, no rub  ABDOMEN: soft, not tender  EXTREMITIES:  pitting leg edema same  LYMPH:   SKIN: no rash   neg  LABS:                        8.8    9.2   )-----------( 194      ( 2017 06:09 )             26.9     2017 06:09    133    |  87     |  85.0   ----------------------------<  335    4.8     |  33.0   |  3.14     Ca    8.9        2017 06:09      PT/INR - ( 2017 06:09 )   PT: 14.5 sec;   INR: 1.31 ratio         PTT - ( 2017 07:25 )  PTT:32.0 sec            RADIOLOGY & ADDITIONAL TESTS:

## 2017-04-04 NOTE — CHART NOTE - NSCHARTNOTEFT_GEN_A_CORE
Rapid Response    76 year old female with PMH  s/p cardiac arrest with ROSC 3/17/17, SENTHIL starting HD today, DM, HTN, CAD not a surgical candidate for CABG with rapid response called for epigastric pain and hypotension. Pt BP was 80's/50's while sitting in the chair. she was placed in bed and laid flat which she states helped her to feel better. When I arrived she states the pain was burning epigastric, improved, denies headaches, dizziness, nausea, vomiting, chest pain, SOB. She was back to her baseline BP and pulse was in the 70's NSR on monitor. She states shes had this pain before but not as severe. She has recently finished eating (she received her lunch 2 hours prior to rapid response) and ate rice and soup.     Vital Signs Last 24 Hrs  T(C): 36.7, Max: 36.9 (04-03 @ 19:09)  T(F): 98.1, Max: 98.5 (04-03 @ 19:09)  HR: 77 (70 - 88)  BP: 81/43 (81/43 - 133/57)  bp improved to baseline  BP(mean): --  RR: 16 (14 - 18)  SpO2: 100% (95% - 100%)    GEN No distress  Chest L/S Clear equal bilat expansion no wheezing  Heart S1S2 RRR  Abd soft mild epigastric tenderness +BS  extremities +2 pitting edema no calf tenderness    AP 76 year old female with rapid response called for hypotension and epigastric pain  CBC, CMP, Mg, Phos, lipase, Troponins (most likely some elevation to troponins because pt had cath yesterday however given pts history of multiple vessel CAD will check)  zena  Spoke to Dr. Ovalles Cardiology who agrees with plan  Spoke to Dr. Klein who agrees with plan.    [place] PGY3

## 2017-04-04 NOTE — PROGRESS NOTE ADULT - SUBJECTIVE AND OBJECTIVE BOX
INTERVAL HPI/OVERNIGHT EVENTS:    CC: systolic CHF, renal failure, diabetes mellitus    S/p cath, no PCI done. No new complaints. evaluated by renal, HD to be initiated.     Vital Signs Last 24 Hrs  T(C): 36.8, Max: 36.9 (04-03 @ 19:09)  T(F): 98.2, Max: 98.5 (04-03 @ 19:09)  HR: 88 (76 - 88)  BP: 110/68 (88/40 - 133/57)  BP(mean): --  RR: 18 (14 - 18)  SpO2: 98% (96% - 100%)    PHYSICAL EXAM:    GENERAL: Not in distress, alert, sitting in chair  CHEST/LUNG: b/l air entry, decreased in bases  HEART: Regular   ABDOMEN: Soft, BS+  EXTREMITIES:  1+ edema    MEDICATIONS  (STANDING):  dextrose 50% Injectable 12.5Gram(s) IV Push once  dextrose 50% Injectable 25Gram(s) IV Push once  dextrose 50% Injectable 25Gram(s) IV Push once  aspirin  chewable 81milliGRAM(s) Oral daily  atorvastatin 20milliGRAM(s) Oral at bedtime  insulin lispro (HumaLOG) corrective regimen sliding scale  SubCutaneous Before meals and at bedtime  ALBUTerol/ipratropium for Nebulization 3milliLiter(s) Nebulizer every 6 hours  lidocaine   Patch 1Patch Transdermal daily  pantoprazole    Tablet 40milliGRAM(s) Oral before breakfast  famotidine    Tablet 20milliGRAM(s) Oral at bedtime  docusate sodium 100milliGRAM(s) Oral three times a day  senna 2Tablet(s) Oral at bedtime  carvedilol 6.25milliGRAM(s) Oral every 12 hours  dextrose 5%. 1000milliLiter(s) IV Continuous <Continuous>  guaiFENesin    Syrup 100milliGRAM(s) Oral every 4 hours  insulin glargine Injectable (LANTUS) 13Unit(s) SubCutaneous at bedtime  insulin lispro Injectable (HumaLOG) 8Unit(s) SubCutaneous three times a day before meals  sodium biphosphate Rectal Enema 1Enema Rectal once  hydrALAZINE 25milliGRAM(s) Oral every 8 hours  isosorbide   mononitrate ER Tablet (IMDUR) 30milliGRAM(s) Oral daily  dextrose 5% + sodium chloride 0.9%. 1000milliLiter(s) IV Continuous <Continuous>  metolazone 2.5milliGRAM(s) Oral daily  epoetin conrado Injectable [id number](s) IV Push once  torsemide 10milliGRAM(s) Oral daily    MEDICATIONS  (PRN):  dextrose Gel 1Dose(s) Oral once PRN Blood Glucose LESS THAN 70 milliGRAM(s)/deciLiter  glucagon  Injectable 1milliGRAM(s) IntraMuscular once PRN Glucose <70 milliGRAM(s)/deciLiter  ALBUTerol    0.083% 2.5milliGRAM(s) Nebulizer every 3 hours PRN Shortness of Breath and/or Wheezing  acetaminophen   Tablet. 650milliGRAM(s) Oral every 6 hours PRN Moderate Pain (4 - 6)  aluminum hydroxide/magnesium hydroxide/simethicone Suspension 30milliLiter(s) Oral every 6 hours PRN Dyspepsia  metoprolol Injectable 5milliGRAM(s) IV Push every 15 minutes PRN for HR greater than 120 sustained a-fib  oxyCODONE  5 mG/acetaminophen 325 mG 2Tablet(s) Oral every 6 hours PRN Severe Pain (7 - 10)      Allergies    IV Contrast (Unknown)    Intolerances          LABS:                          8.8    9.2   )-----------( 194      ( 04 Apr 2017 06:09 )             26.9     04 Apr 2017 06:09    133    |  87     |  85.0   ----------------------------<  335    4.8     |  33.0   |  3.14     Ca    8.9        04 Apr 2017 06:09      PT/INR - ( 04 Apr 2017 06:09 )   PT: 14.5 sec;   INR: 1.31 ratio         PTT - ( 03 Apr 2017 07:25 )  PTT:32.0 sec      RADIOLOGY & ADDITIONAL TESTS:

## 2017-04-04 NOTE — CONSULT NOTE ADULT - SUBJECTIVE AND OBJECTIVE BOX
[facility]                                                    [address] , [address], [place] [address]                                                         PHONE: [phone]    FAX: [phone] [address], [address], [place] [address]                                                 PHONE: [phone]    FAX: [phone]  *******************************************************************************    Reason for Consult: Cardiac arrest    HPI:  CRITICAL [place] is a 68y Female with PMH of of DM, HTN, CRF, CHF, found by son in living room in sofa and unresponsive last seen normal last night. per son her sugar was low and she looked to be not breathing further history unable to obtain 2/2 to pts mental status. per ems she was unconscious when arrive with blood sugar 22 and then preceded to have an asystolic arrest that responded to iv epinephrine and pt presented to ER with a pulse but unresponsive. Upon arrival to the ER she again developed PEA, she was in shock and placed on Levophed. Initial ABG showed a severe respiratory acidosis. She was on full vent support. Modified hypothermia protocol was initiated. EKG showed no acute changes. As per her son she has been complaining of general lethargy and weakness. No h/o of recent chest pain. Her recent nuclear stress test 3/13/17 with old infarct and no active ischemia, EF of 46%, echo form 3/11/17 with EF of 40-45%, mild to moderate valvular abn, mod pulm HTN.     	  PAST MEDICAL & SURGICAL HISTORY:  Diabetes  Kidney disease  Hypertension  No significant past surgical history      IV Contrast (Unknown)      MEDICATIONS  (STANDING):  pantoprazole  Injectable 40milliGRAM(s) IV Push daily  heparin  Injectable 5000Unit(s) SubCutaneous every 12 hours  insulin regular  human corrective regimen sliding scale  SubCutaneous every 6 hours  dextrose 50% Injectable 12.5Gram(s) IV Push once  dextrose 50% Injectable 25Gram(s) IV Push once  dextrose 50% Injectable 25Gram(s) IV Push once  DOBUTamine Infusion 2MICROgram(s)/kG/Min IV Continuous <Continuous>  propofol Infusion 20MICROgram(s)/kG/Min IV Continuous <Continuous>  aspirin  chewable 81milliGRAM(s) Oral daily  atorvastatin 20milliGRAM(s) Oral at bedtime    MEDICATIONS  (PRN):  fentaNYL    Injectable 50MICROGram(s) IV Push every 1 hour PRN Moderate Pain (4 - 6)  dextrose Gel 1Dose(s) Oral once PRN Blood Glucose LESS THAN 70 milliGRAM(s)/deciLiter  glucagon  Injectable 1milliGRAM(s) IntraMuscular once PRN Glucose <70 milliGRAM(s)/deciLiter      Social History: no active tobacco / EtOH / IVDA    Family History:     ROS: As noted above, otherwise unremarkable.    Vital Signs Last 24 Hrs  T(C): 35.6, Max: 36 (03-17 @ 11:27)  T(F): 96.1, Max: 96.8 (03-17 @ 11:27)  HR: 71 (32 - 106)  BP: 114/60 (104/74 - 221/132)  BP(mean): 78 (78 - 78)  RR: 28 (11 - 32)  SpO2: 91% (91% - 100%)    I&O's Detail    I & Os for current day (as of 17 Mar 2017 19:14)  =============================================  IN:    DOBUTamine Infusion: 280 ml    propofol Infusion: 77.8 ml    Total IN: 357.8 ml  ---------------------------------------------  OUT:    Indwelling Catheter - Urethral: 680 ml    Nasoenteral Tube: 10 ml    Total OUT: 690 ml  ---------------------------------------------  Total NET: -332.2 ml    I&O's Summary    I & Os for current day (as of 17 Mar 2017 19:14)  =============================================  IN: 357.8 ml / OUT: 690 ml / NET: -332.2 ml      Mode: AC/ CMV (Assist Control/ Continuous Mandatory Ventilation), RR (machine): 22, TV (machine): 300, FiO2: 55, PEEP: 8, ITime: 0.8, MAP: 13, PIP: 31    PHYSICAL EXAM:  General: Appears well developed, well nourished, no acute distress  HEENT: Head: normocephalic, atraumatic  Eyes: Pupils equal and reactive  Neck: Supple, no carotid bruit, no JVD, no HJR  CARDIOVASCULAR: Normal S1 and S2, no murmur, rub, or gallop  LUNGS: Clear to auscultation bilaterally, no rales, rhonchi or wheeze  ABDOMEN: Soft, nontender, non-distended, positive bowel sounds, no mass or bruit  EXTREMITIES: No edema, distal pulses WNL  SKIN: Warm and dry with normal turgor  NEURO: Alert & oriented x 3, grossly intact  PSYCH: normal mood and affect    LABS:                        12.7   12.1  )-----------( 143      ( 17 Mar 2017 08:47 )             39.6     17 Mar 2017 15:02    139    |  90     |  76.0   ----------------------------<  159    4.0     |  33.0   |  2.32     Ca    9.6        17 Mar 2017 15:02  Phos  8.3       17 Mar 2017 08:47  Mg     2.7       17 Mar 2017 08:47    TPro  6.2    /  Alb  3.1    /  TBili  0.6    /  DBili  x      /  AST  184    /  ALT  90     /  AlkPhos  95     17 Mar 2017 15:02    CARDIAC MARKERS ( 17 Mar 2017 15:02 )  x     / 0.40 ng/mL / x     / x     / x      CARDIAC MARKERS ( 17 Mar 2017 08:47 )  x     / 0.10 ng/mL / x     / x     / x          PT/INR - ( 17 Mar 2017 09:43 )   PT: 13.1 sec;   INR: 1.19 ratio         PTT - ( 17 Mar 2017 09:43 )  PTT:34.7 sec    RADIOLOGY & ADDITIONAL STUDIES:    ECG: SR with non specific ST-T changes    ECHO:  3/17/17  Summary:   1. Left ventricular ejection fraction, by visual estimation, is 30 to   35%.   2. Technically adequate study.   3. Moderately to severely decreased global left ventricular systolic   function.   4. Basal and mid anterior septum, basal and mid inferior septum, mid   anterolateral segment, and basal inferior segment are abnormal as   described above.   5. Normal left ventricular internal cavity size.   6. Trivial pericardial effusion.   7. Moderate mitral annular calcification.   8. Thickening of the anterior and posterior mitral valve leaflets.   9. Sclerotic aortic valve with normal opening.  10. Estimated pulmonary artery systolic pressure is 44.3 mmHg assuming a   right atrial pressure of 10 mmHg, which is consistent with mild pulmonary   hypertension.  11. Intra-atrial septal aneurysm.        Assessment and Plan:  In summary, BABAR MUÑIZ is a 68y Female with PMH of of DM, HTN, CRF, CHF, a/w  S/P Cardiac Arrest with unclear: etiology. EKG showed no acute changes. As per her son she has been complaining of general lethargy and weakness. No h/o of recent chest pain. Her recent nuclear stress test 3/13/17 with old infarct and no active ischemia, EF of 46%, echo form 3/11/17 with EF of 40-45%, mild to moderate valvular abn, mod pulm HTN.  - Severe LV dysfunction based on today's echo, new since one month ago, Pulmonary Edema/CHF. Agree with diuresis as tolerated,     - Hypercapnic Respiratory failure/Respiratory Acidosis: Continue vent support  - Wean pressors as tolerated  - Chronic Renal failure, f/u BUN/Crt, lytes.   - May come to Cardiac Catheterization when renal function is stable  - Rhythm stable       We will follow with you.  Thank you for allowing me to participate in the care of your patient.      Sincerely,
HPI:  68F whose name in Christina Cardenas, Pmhx of DM, HTN, CRF, CHF (diastolic). Found lethargic at home by son- BG was 22, the son attempted to give her juice,, but she collapsed. EMS arrived and she was found in PEA. Resuscitation was undertaken, after several rounds of Epinephrine there was ROSC. Upon arrival to the ER she again developed PEA, resuscitation was resumed and again there was ROSC,  however she was in shock and placed on Levophed. Initial ABG showed a severe respiratory acidosis. She was on full vent support and settings were adjusted.   Pt was weaned off ventilator and transferred to the floor Pt has been noted to have hyperglycmei as she is on steroids- consult called to manage her DM \   At home  pt was on insulin regimen   lantus  and Novolog sliding scale  BS were reasonably controlled she states inth 140-110-170 range   PAST MEDICAL & SURGICAL HISTORY:  Diabetes x 39 years - always on insuin  Kidney disease SENTHIL ON CRI  Hypertension  No significant past surgical history      FAMILY HISTORY:      SOCIAL HISTORY: [place] [facility] [place]     REVIEW OF SYSTEMS:    Constitutional: No fever, no chills, no change in weight.    Eyes: stable vision   Neck: No neck pain, no change in voice.    Lungs: breathing is better No dyspnea   CV: No chest pain, no palpitations,   GI: No nausea, no vomiting, no constipation, no diarrhea, no abdominal pain    : No urinary frequency, no blood in urine, no urinary burning, no difficulty voiding.    Musculoskeletal: No muscle pain, no joint pain, no swelling.    Skin: No rash, no infections.    Neurologic: No headaches, no weakness, no burning or pain in feet, no tremor.    Endocrine: No heat intolerance, no cold intolerance, no increased sweating, no shakiness between meals.    Psych: No depression, no anxiety, no trouble concentrating    MEDICATIONS  (STANDING):  heparin  Injectable 5000Unit(s) SubCutaneous every 12 hours  dextrose 50% Injectable 12.5Gram(s) IV Push once  dextrose 50% Injectable 25Gram(s) IV Push once  dextrose 50% Injectable 25Gram(s) IV Push once  aspirin  chewable 81milliGRAM(s) Oral daily  atorvastatin 20milliGRAM(s) Oral at bedtime  lidocaine   Patch 1Patch Transdermal every 72 hours  insulin lispro (HumaLOG) corrective regimen sliding scale  SubCutaneous Before meals and at bedtime  ALBUTerol/ipratropium for Nebulization 3milliLiter(s) Nebulizer every 6 hours  lidocaine   Patch 1Patch Transdermal daily  pantoprazole    Tablet 40milliGRAM(s) Oral before breakfast  famotidine    Tablet 20milliGRAM(s) Oral at bedtime  docusate sodium 100milliGRAM(s) Oral three times a day  senna 2Tablet(s) Oral at bedtime  carvedilol 6.25milliGRAM(s) Oral every 12 hours  cefTRIAXone   IVPB  IV Intermittent   azithromycin  IVPB  IV Intermittent   azithromycin  IVPB 500milliGRAM(s) IV Intermittent every 24 hours  cefTRIAXone   IVPB 1Gram(s) IV Intermittent every 24 hours  insulin glargine Injectable (LANTUS) 15Unit(s) SubCutaneous at bedtime  insulin lispro Injectable (HumaLOG) 3Unit(s) SubCutaneous three times a day before meals  dextrose 5%. 1000milliLiter(s) IV Continuous <Continuous>  methylPREDNISolone sodium succinate Injectable 40milliGRAM(s) IV Push daily  sodium chloride 0.9%. 1000milliLiter(s) IV Continuous <Continuous>  guaiFENesin    Syrup 100milliGRAM(s) Oral every 4 hours    MEDICATIONS  (PRN):  dextrose Gel 1Dose(s) Oral once PRN Blood Glucose LESS THAN 70 milliGRAM(s)/deciLiter  glucagon  Injectable 1milliGRAM(s) IntraMuscular once PRN Glucose <70 milliGRAM(s)/deciLiter  HYDROmorphone  Injectable 0.25milliGRAM(s) IV Push every 4 hours PRN Severe Pain (7 - 10)  ALBUTerol    0.083% 2.5milliGRAM(s) Nebulizer every 3 hours PRN Shortness of Breath and/or Wheezing  acetaminophen   Tablet. 650milliGRAM(s) Oral every 6 hours PRN Moderate Pain (4 - 6)  aluminum hydroxide/magnesium hydroxide/simethicone Suspension 30milliLiter(s) Oral every 6 hours PRN Dyspepsia      Allergies    IV Contrast (Unknown)    Intolerances          CAPILLARY BLOOD GLUCOSE  229 (23 Mar 2017 22:00)      PHYSICAL EXAM:    Vital Signs Last 24 Hrs  T(C): 36.8, Max: 36.8 (03-23 @ 17:35)  T(F): 98.3, Max: 98.3 (03-23 @ 17:35)  HR: 80 (74 - 82)  BP: 102/58 (90/50 - 106/56)  BP(mean): --  RR: 19 (18 - 22)  SpO2: 97% (92% - 100%)    General appearance: Well developed, well nourished.      Neck: Trachea midline. No thyroid enlargement.    Lungs: Normal respiratory excursion. Lungs clear.    CV: Regular cardiac rhythm. No murmur. Carotid and pedal pulses intact.    Abdomen: Soft, non tender, no organomegaly or mass.    Musculoskeletal: No cyanosis, clubbing, + edema. No pedal lesions.    Skin: Warm and moist. No rash. No acanthosis.    Neuro: Cranial nerves intact. Normal motor and sensory function. DTR's normal.    Psych: Normal affect, good judgement.            LABS:                        10.5   10.1  )-----------( 126      ( 23 Mar 2017 07:30 )             31.7     23 Mar 2017 07:31    132    |  89     |  108.0  ----------------------------<  266    4.7     |  32.0   |  2.80     Ca    8.6        23 Mar 2017 07:31                CAPILLARY BLOOD GLUCOSE  229 (23 Mar 2017 22:00)  386 (22 Mar 2017 22:10)  422 (22 Mar 2017 17:07)  475 (22 Mar 2017 11:32)  406 (22 Mar 2017 08:00)  379 (21 Mar 2017 23:14)  393 (21 Mar 2017 17:00)  393 (21 Mar 2017 12:14)  338 (21 Mar 2017 08:00)  268 (20 Mar 2017 21:59)  202 (20 Mar 2017 18:09)  261 (20 Mar 2017 12:53)  263 (20 Mar 2017 08:55)      RADIOLOGY & ADDITIONAL STUDIES:
Patient is a 76y old  Female who presents with a chief complaint of Unresponsive/Cardiac Arrest (17 Mar 2017 11:27)      HPI:  68F whose name in Christina Cardenas, Pmx of DM, HTN, CRF, CHF (diastolic). Found lethargic at home by her son and brought to the ER. Pt had cariac arrest and respiratory failure.  She spent a  brief period on mechanical ventilation but now extubated, awake and doing well. Pt with known CM.  We are called regarding her CRF and probable need for cardiac cath.    PAST MEDICAL & SURGICAL HISTORY:  Diabetes  Kidney disease  Hypertension  No significant past surgical history      FAMILY HISTORY:  N/C for renal disease    Social History:  No current tobacco, etoh nor drug abuse    MEDICATIONS  (STANDING):  heparin  Injectable 5000Unit(s) SubCutaneous every 12 hours  dextrose 50% Injectable 12.5Gram(s) IV Push once  dextrose 50% Injectable 25Gram(s) IV Push once  dextrose 50% Injectable 25Gram(s) IV Push once  aspirin  chewable 81milliGRAM(s) Oral daily  atorvastatin 20milliGRAM(s) Oral at bedtime  lidocaine   Patch 1Patch Transdermal every 72 hours  insulin lispro (HumaLOG) corrective regimen sliding scale  SubCutaneous Before meals and at bedtime  insulin glargine Injectable (LANTUS) 5Unit(s) SubCutaneous at bedtime  ALBUTerol/ipratropium for Nebulization 3milliLiter(s) Nebulizer every 6 hours  lidocaine   Patch 1Patch Transdermal daily  pantoprazole    Tablet 40milliGRAM(s) Oral before breakfast  famotidine    Tablet 20milliGRAM(s) Oral at bedtime    MEDICATIONS  (PRN):  dextrose Gel 1Dose(s) Oral once PRN Blood Glucose LESS THAN 70 milliGRAM(s)/deciLiter  glucagon  Injectable 1milliGRAM(s) IntraMuscular once PRN Glucose <70 milliGRAM(s)/deciLiter  HYDROmorphone  Injectable 0.25milliGRAM(s) IV Push every 4 hours PRN Severe Pain (7 - 10)      Allergies    IV Contrast (Unknown)    Intolerances        REVIEW OF SYSTEMS:    CONSTITUTIONAL: No fever, weight loss, + fatigue  EYES: No eye pain, visual disturbances, or discharge  ENMT:  No difficulty hearing, tinnitus, vertigo; No sinus or throat pain  NECK: No pain or stiffness  BREASTS: No pain, masses, or nipple discharge  RESPIRATORY: No cough, wheezing, chills or hemoptysis; + mild shortness of breath  CARDIOVASCULAR: No chest pain, palpitations, dizziness, or leg swelling  GASTROINTESTINAL: No abdominal or epigastric pain. No nausea, vomiting, or hematemesis; No diarrhea or constipation. No melena or hematochezia.  GENITOURINARY: No dysuria, frequency, hematuria, or incontinence  NEUROLOGICAL: No headaches, memory loss, loss of strength, numbness, or tremors  SKIN: No itching, burning, rashes, or lesions   MUSCULOSKELETAL: No joint pain or swelling; No muscle, back, or extremity pain  a      Vital Signs Last 24 Hrs  T(C): 36.9, Max: 37.6 (18 @ 16:00)  T(F): 98.4, Max: 99.6 (18 @ 16:00)  HR: 80 (80 - 127)  BP: 115/58 (93/51 - 152/91)  BP(mean): 82 (68 - 110)  RR: 23 (0 - 32)  SpO2: 99% (94% - 100%)    PHYSICAL EXAM:    GENERAL: NAD, generalized weakness  HEAD:  Atraumatic, Normocephalic  EYES: EOMI, PERRLA, conjunctiva and sclera clear  NECK: Supple, No JVD, Normal thyroid  NERVOUS SYSTEM:  Alert & Oriented X3,  intact and symmetric  CHEST/LUNG: Clear to percussion bilaterally; No rales, rhonchi, wheezing, or rubs  HEART: Regular rate and rhythm; No rub               Pain to palpation over chest wall  ABDOMEN: Soft, Nontender, Nondistended; Bowel sounds present  EXTREMITIES:  2+ Peripheral Pulses, No clubbing, cyanosis, or edema  LYMPH: No lymphadenopathy noted  SKIN: No rashes or lesions      LABS:                        9.9    12.9  )-----------( 107      ( 19 Mar 2017 06:00 )             30.3     19 Mar 2017 06:00    140    |  91     |  85.0   ----------------------------<  148    4.5     |  36.0   |  2.75     Ca    9.1        19 Mar 2017 06:00  Phos  4.4       19 Mar 2017 06:00  Mg     2.3       19 Mar 2017 06:00    TPro  6.2    /  Alb  3.1    /  TBili  0.6    /  DBili  x      /  AST  184    /  ALT  90     /  AlkPhos  95     17 Mar 2017 15:02      Urinalysis Basic - ( 17 Mar 2017 15:03 )    Color: Yellow / Appearance: Clear / S.010 / pH: x  Gluc: x / Ketone: Negative  / Bili: Negative / Urobili: Negative mg/dL   Blood: x / Protein: Negative mg/dL / Nitrite: Negative   Leuk Esterase: Trace / RBC: x / WBC 0-2   Sq Epi: x / Non Sq Epi: Occasional / Bacteria: x      Magnesium, Serum: 2.3 mg/dL ( @ 06:00)  Phosphorus Level, Serum: 4.4 mg/dL ( @ 06:00)      RADIOLOGY & ADDITIONAL TESTS:   EXAM:  CHEST SINGLE VIEW FRONTAL                          PROCEDURE DATE:  2017        INTERPRETATION:  EXAMINATION DATE: 2017 at 0808 hours.  CLINICAL INFORMATION: Chest pain.    TECHNIQUE: Frontal view of the chest   COMPARISON: None.    FINDINGS:    LINES/TUBES: Endotracheal tube with tip just above the kenny. Enteric   tube with tip overlying the region of the stomach.  LUNGS/PLEURA: Bilateral perihilar opacities, possibly pulmonary edema or   multifocal pneumonia. No pneumothorax.  MEDIASTINUM: Cardiomegaly.  OTHER: None.    IMPRESSION:     Bilateral perihilar opacities, possibly pulmonary edema or multifocal   pneumonia.
HPI:  Pt is 68F with PMH significant for DM, HTN, CRF, CHF.  Pt was found lethargic at home on morning of admission 3/17/17 by her son. Her BG was 22, the son attempted to give her juice,, but she collapsed. EMS arrived and she was found in PEA. Resuscitation was undertaken, after several rounds of Epinephrine there was ROSC.  Pt admitted s/p cardiac arrest, likely secondary to hypoglycemia induced hypercapnia (acute on chronic) from stupor causing increased myocardial demand. ROSC with return of mental status, therefore no modified hypothermia, intubated 3/17 extubated 3/18, was on dobutamine infusion for cardiogenic shock. Pt was transferred to medical service on 3/19/17 and followed by cardiology and nephrology. Patient was optimized for cardiac catheterization and patient/son wanted to pursue cardiac catheterization knowing risks involved with further decline in renal function and possible need for dialysis. CRF(late III-early IV) and ARF due to ATN and pre renal azotemia. Pt s/p R heart cath 3/27/17, L heart cath unable to be performed as she could not lay flat. She is on diuresis based on renal function. Cardiac cath scheduled and dialysis pending      PCP: Dr Delaney    PT/OT EVALUATION:  TBA      PAST MEDICAL & SURGICAL HISTORY:  Diabetes  Kidney disease  Hypertension  No significant past surgical history      FAMILY HISTORY: Non-contributory      SOCIAL HISTORY:  TOBACCO: denies history  ALCOHOL: denies abuse  IVDA: denies history    FUNCTIONAL, ENVIRONMENTAL HISTORY:  Pt resides with her son in a house with 3 steps to enter.  No steps to negotiate indoors.  Pt was independent with a cane or walker prior to admission which she used for imbalance      Allergies    IV Contrast (Unknown)    Intolerances        MEDICATIONS  (STANDING):  dextrose 50% Injectable 12.5Gram(s) IV Push once  dextrose 50% Injectable 25Gram(s) IV Push once  dextrose 50% Injectable 25Gram(s) IV Push once  aspirin  chewable 81milliGRAM(s) Oral daily  atorvastatin 20milliGRAM(s) Oral at bedtime  insulin lispro (HumaLOG) corrective regimen sliding scale  SubCutaneous Before meals and at bedtime  ALBUTerol/ipratropium for Nebulization 3milliLiter(s) Nebulizer every 6 hours  lidocaine   Patch 1Patch Transdermal daily  pantoprazole    Tablet 40milliGRAM(s) Oral before breakfast  famotidine    Tablet 20milliGRAM(s) Oral at bedtime  docusate sodium 100milliGRAM(s) Oral three times a day  senna 2Tablet(s) Oral at bedtime  carvedilol 6.25milliGRAM(s) Oral every 12 hours  dextrose 5%. 1000milliLiter(s) IV Continuous <Continuous>  guaiFENesin    Syrup 100milliGRAM(s) Oral every 4 hours  insulin glargine Injectable (LANTUS) 13Unit(s) SubCutaneous at bedtime  insulin lispro Injectable (HumaLOG) 8Unit(s) SubCutaneous three times a day before meals  sodium biphosphate Rectal Enema 1Enema Rectal once  hydrALAZINE 25milliGRAM(s) Oral every 8 hours  isosorbide   mononitrate ER Tablet (IMDUR) 30milliGRAM(s) Oral daily  dextrose 5% + sodium chloride 0.9%. 1000milliLiter(s) IV Continuous <Continuous>  metolazone 2.5milliGRAM(s) Oral daily  epoetin conrado Injectable 34207Iszx(s) IV Push once  torsemide 10milliGRAM(s) Oral daily    MEDICATIONS  (PRN):  dextrose Gel 1Dose(s) Oral once PRN Blood Glucose LESS THAN 70 milliGRAM(s)/deciLiter  glucagon  Injectable 1milliGRAM(s) IntraMuscular once PRN Glucose <70 milliGRAM(s)/deciLiter  ALBUTerol    0.083% 2.5milliGRAM(s) Nebulizer every 3 hours PRN Shortness of Breath and/or Wheezing  acetaminophen   Tablet. 650milliGRAM(s) Oral every 6 hours PRN Moderate Pain (4 - 6)  aluminum hydroxide/magnesium hydroxide/simethicone Suspension 30milliLiter(s) Oral every 6 hours PRN Dyspepsia  metoprolol Injectable 5milliGRAM(s) IV Push every 15 minutes PRN for HR greater than 120 sustained a-fib  oxyCODONE  5 mG/acetaminophen 325 mG 2Tablet(s) Oral every 6 hours PRN Severe Pain (7 - 10)      REVIEW OF SYSTEMS:    CONSTITUTIONAL: + fatigue with ambulation  EYES: No eye pain, visual disturbances, or discharge  ENMT:  No difficulty hearing, tinnitus, vertigo; No sinus or throat pain  NECK: No pain or stiffness  RESPIRATORY: No cough, wheezing, chills or hemoptysis; No shortness of breath  CARDIOVASCULAR: No chest pain, palpitations, dizziness.  + leg swelling  GASTROINTESTINAL: No abdominal or epigastric pain. No nausea, vomiting, or hematemesis; No diarrhea or constipation. No melena or hematochezia.  GENITOURINARY: No dysuria, frequency, hematuria, or incontinence  NEUROLOGICAL: No headaches, memory loss, loss of strength, numbness, or tremors  SKIN: No itching, burning, rashes, or lesions   LYMPH NODES: No enlarged glands  ENDOCRINE: No heat or cold intolerance; No hair loss  MUSCULOSKELETAL: No joint pain or swelling; No muscle, back, or extremity pain  PSYCHIATRIC: No depression, anxiety, mood swings, or difficulty sleeping  HEME/LYMPH: No easy bruising, or bleeding gums  ALLERGY AND IMMUNOLOGIC: No hives or eczema    Vital Signs Last 24 Hrs  T(C): 36.8, Max: 36.9 (04-03 @ 19:09)  T(F): 98.2, Max: 98.5 (04-03 @ 19:09)  HR: 88 (76 - 88)  BP: 110/68 (88/40 - 133/57)  BP(mean): --  RR: 18 (14 - 18)  SpO2: 98% (96% - 100%)    PHYSICAL EXAM:    GENERAL: NAD, well-groomed, well-developed  HEAD:  Atraumatic, Normocephalic  EYES: EOMI, PERRLA, conjunctiva and sclera clear  ENMT: Good dentition, No lesions  Oxygenating via NC  NECK: Supple  HEART: Regular rate and rhythm; No murmurs, rubs, or gallops  CHEST/LUNG: Clear to auscultation bilaterally  ABDOMEN: Soft, Nontender, Nondistended  EXTREMITIES:  Mod BLE edema  SKIN: No rashes or lesions  NERVOUS SYSTEM:  Alert & Oriented X3, Good concentration  CNs II-XII grossly intact  Motor Strength 5/5 B/L upper and lower extremities  Sensation intact to light touch symmetrically    LABS:                        8.8    9.2   )-----------( 194      ( 04 Apr 2017 06:09 )             26.9     04 Apr 2017 06:09    133    |  87     |  85.0   ----------------------------<  335    4.8     |  33.0   |  3.14     Ca    8.9        04 Apr 2017 06:09      PT/INR - ( 04 Apr 2017 06:09 )   PT: 14.5 sec;   INR: 1.31 ratio         PTT - ( 03 Apr 2017 07:25 )  PTT:32.0 sec      RADIOLOGY & ADDITIONAL STUDIES:
PULMONARY CONSULT NOTE      SOCORRO CARDENASN-[id number]    Patient is a 76y old  Female who presents with a chief complaint of Unresponsive/Cardiac Arrest (17 Mar 2017 11:27)  76F whose name in Krista Cardenas, Pmx of DM, HTN, CRF, CHF (diastolic), OHS and asthma. Found lethargic at home by her son. Her BG was 22, the son attempted to give her juice,, but she collapsed. EMS arrived and she was found in PEA. Resuscitation was undertaken, after several rounds of Epinephrine there was ROSC. Upon arrival to the ER she again developed PEA, resuscitation was resumed and again there was ROSC,  however she was in shock and placed on Levophed. Initial ABG showed a severe respiratory acidosis. She was on full vent support and settings were adjusted. She was spontaneously breathing but had no spontaneous movement and was not responding to her name, so the modified hypothermia protocol was initiated. EKG showed no acute St/T changes. In speaking with the patient's son, she had been complaining of general lethargy and weakness. No recent chest pain, cough fever.   Progressed well.  Extubated.  Hypercarbic    Known to us.  Asthma.  GERD.  OHS      HISTORY OF PRESENT ILLNESS:    MEDICATIONS  (STANDING):  heparin  Injectable 5000Unit(s) SubCutaneous every 12 hours  dextrose 50% Injectable 12.5Gram(s) IV Push once  dextrose 50% Injectable 25Gram(s) IV Push once  dextrose 50% Injectable 25Gram(s) IV Push once  aspirin  chewable 81milliGRAM(s) Oral daily  atorvastatin 20milliGRAM(s) Oral at bedtime  lidocaine   Patch 1Patch Transdermal every 72 hours  insulin lispro (HumaLOG) corrective regimen sliding scale  SubCutaneous Before meals and at bedtime  insulin glargine Injectable (LANTUS) 5Unit(s) SubCutaneous at bedtime  ALBUTerol/ipratropium for Nebulization 3milliLiter(s) Nebulizer every 6 hours  lidocaine   Patch 1Patch Transdermal daily      MEDICATIONS  (PRN):  dextrose Gel 1Dose(s) Oral once PRN Blood Glucose LESS THAN 70 milliGRAM(s)/deciLiter  glucagon  Injectable 1milliGRAM(s) IntraMuscular once PRN Glucose <70 milliGRAM(s)/deciLiter  HYDROmorphone  Injectable 0.25milliGRAM(s) IV Push every 4 hours PRN Severe Pain (7 - 10)      Allergies    IV Contrast (Unknown)    Intolerances        PAST MEDICAL & SURGICAL HISTORY:  Diabetes  Kidney disease  Hypertension  No significant past surgical history      FAMILY HISTORY:      SOCIAL HISTORY  Smoking History:     REVIEW OF SYSTEMS:    CONSTITUTIONAL:  No fevers, chills, sweats    HEENT:  Eyes:  No diplopia or blurred vision. ENT:  No earache, sore throat or runny nose.    CARDIOVASCULAR:  No pressure, squeezing, tightness, or heaviness about the chest; no palpitations.    RESPIRATORY:  No cough, shortness of breath, PND or orthopnea. Mild SOBOE    GASTROINTESTINAL:  No nausea, vomiting or diarrhea. Epigastric pain    GENITOURINARY:  No dysuria, frequency or urgency.    NEUROLOGIC:  No paresthesias, fasciculations, seizures or weakness.    PSYCHIATRIC:  No disorder of thought or mood.    Vital Signs Last 24 Hrs  T(C): 36.9, Max: 37.6 (03-18 @ 16:00)  T(F): 98.4, Max: 99.6 (03-18 @ 16:00)  HR: 81 (81 - 127)  BP: 101/70 (93/51 - 152/91)  BP(mean): 82 (68 - 110)  RR: 29 (0 - 32)  SpO2: 97% (94% - 100%)    PHYSICAL EXAMINATION:    GENERAL: The patient is an obese female in no apparent distress.     HEENT: Head is normocephalic and atraumatic. Extraocular muscles are intact. Mucous membranes are moist.     NECK: Supple.     LUNGS: Diminished to auscultation without wheezing, rales, or rhonchi. Respirations unlabored    HEART: Regular rate and rhythm without murmur.    ABDOMEN: Soft, and nondistended.  No hepatosplenomegaly is noted. Epigastric tenderness    EXTREMITIES: Without any cyanosis, clubbing, rash,     NEUROLOGIC: Grossly intact.      LABS:                        9.9    12.9  )-----------( 107      ( 19 Mar 2017 06:00 )             30.3     19 Mar 2017 06:00    140    |  91     |  85.0   ----------------------------<  148    4.5     |  36.0   |  2.75     Ca    9.1        19 Mar 2017 06:00  Phos  4.4       19 Mar 2017 06:00  Mg     2.3       19 Mar 2017 06:00    TPro  6.2    /  Alb  3.1    /  TBili  0.6    /  DBili  x      /  AST  184    /  ALT  90     /  AlkPhos  95     17 Mar 2017 15:02      Urinalysis Basic - ( 17 Mar 2017 15:03 )    Color: Yellow / Appearance: Clear / S.010 / pH: x  Gluc: x / Ketone: Negative  / Bili: Negative / Urobili: Negative mg/dL   Blood: x / Protein: Negative mg/dL / Nitrite: Negative   Leuk Esterase: Trace / RBC: x / WBC 0-2   Sq Epi: x / Non Sq Epi: Occasional / Bacteria: x      ABG - ( 18 Mar 2017 21:52 )  pH: 7.48  /  pCO2: 54    /  pO2: 72    / HCO3: 39    / Base Excess: 15.0  /  SaO2: 95            CARDIAC MARKERS ( 17 Mar 2017 15:02 )  x     / 0.40 ng/mL / x     / x     / x            RADIOLOGY & ADDITIONAL STUDIES:   EXAM:  CHEST SINGLE VIEW FRONTAL                          PROCEDURE DATE:  2017        INTERPRETATION:  EXAMINATION DATE: 2017 at 1036 hours.  CLINICAL INFORMATION: Line placement.    TECHNIQUE: Frontal view of the chest   COMPARISON: 2017 at 0808 hours.    FINDINGS:    LINES/TUBES: Endotracheal tube with tip above the kenny. Enteric tube   with tip coursing below the left hemidiaphragm and overlying the region   of the stomach. New left-sided central venous catheterwith tip overlying   the superior vena cava.  LUNGS/PLEURA: Increased bilateral perihilar opacities, possibly   cardiogenic or noncardiogenic pulmonary edema.  MEDIASTINUM: Cardiac silhouette is enlarged.  OTHER: None.    IMPRESSION:     Since 2017 at 0808 hours, increased bilateral patchy airspace   opacities, possibly cardiogenic or noncardiogenic pulmonary edema.    MELANIE OLIVAREZ M.D., ATTENDING RADIOLOGIST  This document has been electronically signed. Mar 17 2017 11:31AM      Echo   1. Left ventricular ejection fraction, by visual estimation, is 30 to   35%.   2. Technically adequate study.   3. Moderately to severely decreased global left ventricular systolic   function.   4. Basal and mid anterior septum, basal and mid inferior septum, mid   anterolateral segment, and basal inferior segment are abnormal as   described above.   5. Normal left ventricular internal cavity size.   6. Trivial pericardial effusion.   7. Moderate mitral annular calcification.   8. Thickening of the anterior and posterior mitral valve leaflets.   9. Sclerotic aortic valve with normal opening.  10. Estimated pulmonary artery systolic pressure is 44.3 mmHg assuming a   right atrial pressure of 10 mmHg, which is consistent with mild pulmonary   hypertension.  11. Intra-atrial septal aneurysm.     MD Soham Electronically signed on 3/17/2017 at 7:22:04 PM
Palliative Medicine Initial Consultation Note    HPI: 76 yr woman hc DM, HTN, CRF, found at home by son, nikos, BS- 22. EMS called, patient in PEA. Patient was resuscitated  with ROSC. In ER, she developed PEA, CPR resumed with ROSC.       PERTINENT PMH REVIEWED:  [x ] YES [ ] NO         Diabetes    Hypertension    Kidney disease.      SOCIAL HISTORY:  EtOH [ ] Yes  [ x] No                                 Drugs [ ] Yes [ x] No                                [ ] smoker [x ] nonsmoker                                 Admitted from: [ x] home [ ] SNF _________ [ ] [facility] ________  Worked as a   , 1 son- Kristian    Surrogate/HCP/Guardian: [ x] YES [ ] NO  son     FAMILY HISTORY: n/c      Baseline ADLs (prior to admission): Lives with son, he oversees her care.   Independent [x ] moderately [ ] fully   Dependent   [ ] moderately [ ]fully    MEDICATIONS  (STANDING):  heparin  Injectable 5000Unit(s) SubCutaneous every 12 hours  dextrose 50% Injectable 12.5Gram(s) IV Push once  dextrose 50% Injectable 25Gram(s) IV Push once  dextrose 50% Injectable 25Gram(s) IV Push once  aspirin  chewable 81milliGRAM(s) Oral daily  atorvastatin 20milliGRAM(s) Oral at bedtime  lidocaine   Patch 1Patch Transdermal every 72 hours  insulin lispro (HumaLOG) corrective regimen sliding scale  SubCutaneous Before meals and at bedtime  insulin glargine Injectable (LANTUS) 5Unit(s) SubCutaneous at bedtime  ALBUTerol/ipratropium for Nebulization 3milliLiter(s) Nebulizer every 6 hours  lidocaine   Patch 1Patch Transdermal daily  pantoprazole    Tablet 40milliGRAM(s) Oral before breakfast  famotidine    Tablet 20milliGRAM(s) Oral at bedtime  docusate sodium 100milliGRAM(s) Oral three times a day  senna 2Tablet(s) Oral at bedtime  carvedilol 6.25milliGRAM(s) Oral every 12 hours  cefTRIAXone   IVPB  IV Intermittent   azithromycin  IVPB  IV Intermittent   methylPREDNISolone sodium succinate Injectable 40milliGRAM(s) IV Push every 8 hours    MEDICATIONS  (PRN):  dextrose Gel 1Dose(s) Oral once PRN Blood Glucose LESS THAN 70 milliGRAM(s)/deciLiter  glucagon  Injectable 1milliGRAM(s) IntraMuscular once PRN Glucose <70 milliGRAM(s)/deciLiter  HYDROmorphone  Injectable 0.25milliGRAM(s) IV Push every 4 hours PRN Severe Pain (7 - 10)  ALBUTerol    0.083% 2.5milliGRAM(s) Nebulizer every 3 hours PRN Shortness of Breath and/or Wheezing      Allergies    IV Contrast (Unknown)    Intolerances        REVIEW OF SYSTEMS   see HPI     General: no fevers, +  fatigue, no loss of appetite    Skin: no rashes, skin changes  	  Ophthalmologic: no eye pain or blurred vision  	  ENMT:	no sore throat, no ear pain    Respiratory and Thorax: + cough,  no  SOB 	    Cardiovascular:	no chest pain, no leg swelling    Gastrointestinal:	no  n/v/d,c    Genitourinary:	no FUD    Musculoskeletal: no muscle pain	    Neurological: no seizures, no dizziness    Hematology/Lymphatics: no petechia or purpura	    Endocrine: no polyuria, no polydipsia	      Karnofsky Performance Score/Palliative Performance Status Version 2:    40  %    Vital Signs Last 24 Hrs  T(C): 36.6, Max: 37.4 (03-19 @ 20:37)  T(F): 97.9, Max: 99.3 (03-19 @ 20:37)  HR: 85 (78 - 88)  BP: 132/70 (114/46 - 141/64)  BP(mean): 85 (85 - 85)  RR: 18 (18 - 36)  SpO2: 100% (66% - 100%)    PHYSICAL EXAM:    General: [x ] alert  [ ] oriented x _3___ [ ] lethargic [ ] agitated                  [ ] cachexia  [ ] nonverbal  [ ] coma    HEENT: [ x] normal  [ ] dry mouth  [ ] ET tube/trach    Lungs: [x ] comfortable [ ] tachypnea/labored breathing  [ ] excessive secretions    CV: [x ] normal  [ ] tachycardia    GI: [x ] normal  [ ] distended  [ ] tender  [ ] no BS               [ ] PEG/NG/OG tube    : [x ] normal  [ ] incontinent  [ ] oliguria/anuria  [ ] corral    MSK: [ ] normal  [ x] weakness  [ ] edema             [ ] ambulatory  [ ] bedbound/wheelchair bound             +tenderness on palpation to sternum     Skin: [ ] normal  [ ] pressure ulcers- Stage_____  [x ] no rash    LABS:                        9.9    12.9  )-----------( 107      ( 19 Mar 2017 06:00 )             30.3     19 Mar 2017 06:00    140    |  91     |  85.0   ----------------------------<  148    4.5     |  36.0   |  2.75     Ca    9.1        19 Mar 2017 06:00  Phos  4.4       19 Mar 2017 06:00  Mg     2.3       19 Mar 2017 06:00          I&O's Summary    I & Os for current day (as of 20 Mar 2017 14:34)  =============================================  IN: 340 ml / OUT: 550 ml / NET: -210 ml      RADIOLOGY & ADDITIONAL STUDIES:  EXAM:  CHEST SINGLE VIEW FRONTAL                          PROCEDURE DATE:  03/17/2017        INTERPRETATION:  EXAMINATION DATE: March 17, 2017 at 1036 hours.  CLINICAL INFORMATION: Line placement.    TECHNIQUE: Frontal view of the chest   COMPARISON: March 17, 2017 at 0808 hours.    FINDINGS:    LINES/TUBES: Endotracheal tube with tip above the kenny. Enteric tube   with tip coursing below the left hemidiaphragm and overlying the region   of the stomach. New left-sided central venous catheterwith tip overlying   the superior vena cava.  LUNGS/PLEURA: Increased bilateral perihilar opacities, possibly   cardiogenic or noncardiogenic pulmonary edema.  MEDIASTINUM: Cardiac silhouette is enlarged.  OTHER: None.    IMPRESSION:     Since March 17, 2017 at 0808 hours, increased bilateral patchy airspace   opacities, possibly cardiogenic or noncardiogenic pulmonary edema.    Thank you for the opportunity to assist with the care of this patient.   [facility] [phone].

## 2017-04-04 NOTE — CONSULT NOTE ADULT - ASSESSMENT
75y/o female with debility.  CAD with acute heart failure s/p cardia arrest, multiple comorbidities including acute on chronic renal failure requiring dialysis, asthma, afib, diabetes, anemia    Continue PT  Cardiac cath pending  Pt likely to require and benefit from acute inpatient rehab.   Will evaluate post cath and will require clearance from cardiology to participate safely in acute inpatient rehab which would consist of at least 3hrs daily and at least 5x/weekly therapy

## 2017-04-04 NOTE — PROGRESS NOTE ADULT - PROBLEM SELECTOR PLAN 1
On Demadex, severe 2 vessel CAD, medical management for now per cardiology. Renal to initiate HD for now

## 2017-04-05 NOTE — PROGRESS NOTE ADULT - ASSESSMENT
76 F admitted s/p cardiac arrest, likely secondary to hypoglycemia induced hypercapnia (acute on chronic) from stupor causing increased myocardial demand. ROSC with return of mental status, therefore no modified hypothermia, intubated 3/17 extubated 3/18, was on dobutamine infusion for cardiogenic shock. Pt was transferred to medical service on 3/19/17 and followed by cardiology and nephrology. Patient was optimized for cardiac catheterization and patient/son wanted to pursue cardiac catheterization knowing risks involved with further decline in renal function and possible need for dialysis. CRF(late III-early IV) and ARF due to ATN and pre renal azotemia. Pt s/p R heart cath 3/27/17, L heart cath unable to be performed as she could not lay flat. She is on diuresis based on renal function. Cardiac cath scheduled for 4/3, showed severe 2 vessel CAD. Renal follow up was done, given clinical condition and renal function, HD initiated on 4/5/17.

## 2017-04-05 NOTE — PROGRESS NOTE ADULT - ASSESSMENT
CRF(late III-early IV): s/p cardiac and respiratory arrest  ARF due to ATN and pre renal azotemia==> progressive with fluid overload  - avoid potential nephrotoxic agents  - diuretics to continue  - will initiate Hd today with UF as tolerates    Anemia: + CRF  - added JENNI

## 2017-04-05 NOTE — PROGRESS NOTE ADULT - PROBLEM SELECTOR PLAN 1
On Demadex, severe 2 vessel CAD, medical management for now per cardiology. HD to be initiated today.

## 2017-04-05 NOTE — PROGRESS NOTE ADULT - SUBJECTIVE AND OBJECTIVE BOX
NEPHROLOGY INTERVAL HPI/OVERNIGHT EVENTS:  pt seated in chair when seen earlier  no acute distress noted    MEDICATIONS  (STANDING):  aspirin  chewable 81milliGRAM(s) Oral daily  atorvastatin 20milliGRAM(s) Oral at bedtime  ALBUTerol/ipratropium for Nebulization 3milliLiter(s) Nebulizer every 6 hours  lidocaine   Patch 1Patch Transdermal daily  pantoprazole    Tablet 40milliGRAM(s) Oral before breakfast  famotidine    Tablet 20milliGRAM(s) Oral at bedtime  docusate sodium 100milliGRAM(s) Oral three times a day  senna 2Tablet(s) Oral at bedtime  carvedilol 6.25milliGRAM(s) Oral every 12 hours  dextrose 5%. 1000milliLiter(s) IV Continuous <Continuous>  guaiFENesin    Syrup 100milliGRAM(s) Oral every 4 hours  sodium biphosphate Rectal Enema 1Enema Rectal once  hydrALAZINE 25milliGRAM(s) Oral every 8 hours  isosorbide   mononitrate ER Tablet (IMDUR) 30milliGRAM(s) Oral daily  dextrose 5% + sodium chloride 0.9%. 1000milliLiter(s) IV Continuous <Continuous>  metolazone 2.5milliGRAM(s) Oral daily  torsemide 10milliGRAM(s) Oral daily  insulin glargine Injectable (LANTUS) 18Unit(s) SubCutaneous at bedtime  insulin lispro Injectable (HumaLOG) 10Unit(s) SubCutaneous before breakfast  insulin lispro Injectable (HumaLOG) 10Unit(s) SubCutaneous before lunch  insulin lispro Injectable (HumaLOG) 10Unit(s) SubCutaneous before dinner  insulin lispro (HumaLOG) corrective regimen sliding scale  SubCutaneous three times a day before meals  dextrose 5%. 1000milliLiter(s) IV Continuous <Continuous>  dextrose 50% Injectable 12.5Gram(s) IV Push once  dextrose 50% Injectable 25Gram(s) IV Push once  dextrose 50% Injectable 25Gram(s) IV Push once    MEDICATIONS  (PRN):  ALBUTerol    0.083% 2.5milliGRAM(s) Nebulizer every 3 hours PRN Shortness of Breath and/or Wheezing  acetaminophen   Tablet. 650milliGRAM(s) Oral every 6 hours PRN Moderate Pain (4 - 6)  aluminum hydroxide/magnesium hydroxide/simethicone Suspension 30milliLiter(s) Oral every 6 hours PRN Dyspepsia  metoprolol Injectable 5milliGRAM(s) IV Push every 15 minutes PRN for HR greater than 120 sustained a-fib  oxyCODONE  5 mG/acetaminophen 325 mG 2Tablet(s) Oral every 6 hours PRN Severe Pain (7 - 10)  dextrose Gel 1Dose(s) Oral once PRN Blood Glucose LESS THAN 70 milliGRAM(s)/deciliter  glucagon  Injectable 1milliGRAM(s) IntraMuscular once PRN Glucose LESS THAN 70 milligrams/deciliter      Allergies    IV Contrast (Unknown)          Vital Signs Last 24 Hrs  T(C): 37.1, Max: 37.1 (04-04 @ 18:33)  T(F): 98.8, Max: 98.8 (04-05 @ 11:30)  HR: 101 (70 - 101)  BP: 102/50 (81/43 - 118/69)  BP(mean): --  RR: 18 (16 - 18)  SpO2: 100% (95% - 100%)    PHYSICAL EXAM:    GENERAL: NAD, generalized weakness  HEAD:  Atraumatic, Normocephalic  EYES: EOMI, PERRLA, conjunctiva and sclera clear  NECK: Supple, No JVD, Normal thyroid  NERVOUS SYSTEM:  Alert & Oriented X3,  intact and symmetric  CHEST/LUNG: Diminished BS at bases  HEART: Regular rate and rhythm; No rub  ABDOMEN: Soft, Nontender, Nondistended; Bowel sounds present  EXTREMITIES:  2+ Peripheral Pulses,   LYMPH: No lymphadenopathy noted  SKIN: No rashes or lesions    LABS:                        8.1    7.8   )-----------( 174      ( 04 Apr 2017 16:31 )             25.0     04-04    134<L>  |  87<L>  |  86.0<H>  ----------------------------<  102  4.0   |  34.0<H>  |  3.47<H>    Ca    8.6      04 Apr 2017 16:31  Phos  4.7     04-04  Mg     2.3     04-04    TPro  6.1<L>  /  Alb  3.2<L>  /  TBili  0.4  /  DBili  x   /  AST  115<H>  /  ALT  111<H>  /  AlkPhos  111  04-04    PT/INR - ( 04 Apr 2017 06:09 )   PT: 14.5 sec;   INR: 1.31 ratio             Magnesium, Serum: 2.3 mg/dL (04-04 @ 16:31)  Phosphorus Level, Serum: 4.7 mg/dL (04-04 @ 16:31)      RADIOLOGY & ADDITIONAL TESTS:   EXAM:  CHEST SINGLE VIEW FRONTAL                          PROCEDURE DATE:  04/02/2017        INTERPRETATION:  Portable chest radiograph        CLINICAL INFORMATION:   Short of breath.    TECHNIQUE:  Portable  AP view of the chest was obtained.    COMPARISON: 4/1/2017 available for review.    FINDINGS:   The lungs  show persistent left lower lobe retrocardiac airspace   consolidation. Left upper lobe and right lung parenchyma clear.    The  heart is enlarged in transverse diameter. No hilar mass. Trachea   midline.           The heart and mediastinum are within normal limits.         Visualized osseous structures are intact.        IMPRESSION:   Left lower lobe airspace consolidation.   Cardiomegaly..

## 2017-04-05 NOTE — PROGRESS NOTE ADULT - SUBJECTIVE AND OBJECTIVE BOX
[facility], [facility]                                          [address] , [address], [place] [address]                                               PHONE: [phone]    FAX: [phone] [address], [address], [place] [address]                                       PHONE: [phone]    FAX: [phone]  *******************************************************************************    Overnight events/Subjective Assessment: no events, no acute complaints    INTERPRETATION OF TELEMETRY (personally reviewed): SR 60-70s, PVCs    IV Contrast (Unknown)    MEDICATIONS  (STANDING):  aspirin  chewable 81milliGRAM(s) Oral daily  atorvastatin 20milliGRAM(s) Oral at bedtime  ALBUTerol/ipratropium for Nebulization 3milliLiter(s) Nebulizer every 6 hours  lidocaine   Patch 1Patch Transdermal daily  pantoprazole    Tablet 40milliGRAM(s) Oral before breakfast  famotidine    Tablet 20milliGRAM(s) Oral at bedtime  docusate sodium 100milliGRAM(s) Oral three times a day  senna 2Tablet(s) Oral at bedtime  carvedilol 6.25milliGRAM(s) Oral every 12 hours  dextrose 5%. 1000milliLiter(s) IV Continuous <Continuous>  guaiFENesin    Syrup 100milliGRAM(s) Oral every 4 hours  sodium biphosphate Rectal Enema 1Enema Rectal once  hydrALAZINE 25milliGRAM(s) Oral every 8 hours  isosorbide   mononitrate ER Tablet (IMDUR) 30milliGRAM(s) Oral daily  dextrose 5% + sodium chloride 0.9%. 1000milliLiter(s) IV Continuous <Continuous>  metolazone 2.5milliGRAM(s) Oral daily  torsemide 10milliGRAM(s) Oral daily  insulin glargine Injectable (LANTUS) 18Unit(s) SubCutaneous at bedtime  insulin lispro Injectable (HumaLOG) 10Unit(s) SubCutaneous before breakfast  insulin lispro Injectable (HumaLOG) 10Unit(s) SubCutaneous before lunch  insulin lispro Injectable (HumaLOG) 10Unit(s) SubCutaneous before dinner  insulin lispro (HumaLOG) corrective regimen sliding scale  SubCutaneous three times a day before meals  dextrose 5%. 1000milliLiter(s) IV Continuous <Continuous>  dextrose 50% Injectable 12.5Gram(s) IV Push once  dextrose 50% Injectable 25Gram(s) IV Push once  dextrose 50% Injectable 25Gram(s) IV Push once    MEDICATIONS  (PRN):  ALBUTerol    0.083% 2.5milliGRAM(s) Nebulizer every 3 hours PRN Shortness of Breath and/or Wheezing  acetaminophen   Tablet. 650milliGRAM(s) Oral every 6 hours PRN Moderate Pain (4 - 6)  aluminum hydroxide/magnesium hydroxide/simethicone Suspension 30milliLiter(s) Oral every 6 hours PRN Dyspepsia  metoprolol Injectable 5milliGRAM(s) IV Push every 15 minutes PRN for HR greater than 120 sustained a-fib  oxyCODONE  5 mG/acetaminophen 325 mG 2Tablet(s) Oral every 6 hours PRN Severe Pain (7 - 10)  dextrose Gel 1Dose(s) Oral once PRN Blood Glucose LESS THAN 70 milliGRAM(s)/deciliter  glucagon  Injectable 1milliGRAM(s) IntraMuscular once PRN Glucose LESS THAN 70 milligrams/deciliter      Vital Signs Last 24 Hrs  T(C): 37, Max: 37.1 (04-04 @ 18:33)  T(F): 98.6, Max: 98.7 (04-04 @ 18:33)  HR: 80 (70 - 88)  BP: 118/69 (81/43 - 118/69)  BP(mean): --  RR: 18 (16 - 18)  SpO2: 100% (95% - 100%)    I&O's Detail    I & Os for current day (as of 05 Apr 2017 07:12)  =============================================  IN:    Oral Fluid: 960 ml    Total IN: 960 ml  ---------------------------------------------  OUT:    Voided: 1200 ml    Total OUT: 1200 ml  ---------------------------------------------  Total NET: -240 ml    I&O's Summary    I & Os for current day (as of 05 Apr 2017 07:12)  =============================================  IN: 960 ml / OUT: 1200 ml / NET: -240 ml          PHYSICAL EXAM:  General: Appears well developed, well nourished, no acute distress  HEENT: Head: normocephalic, atraumatic  Eyes: Pupils equal and reactive  Neck: Supple, no carotid bruit, no JVD, no HJR  CARDIOVASCULAR: Normal S1 and S2, no murmur, rub, or gallop  LUNGS: Clear to auscultation bilaterally, no rales, rhonchi or wheeze  ABDOMEN: Soft, nontender, non-distended, positive bowel sounds, no mass or bruit  EXTREMITIES: bilat LE pitting edema, distal pulses WNL  SKIN: Warm and dry with normal turgor  NEURO: Alert & oriented x 3, grossly intact  PSYCH: normal mood and affect          LABS:                        8.1    7.8   )-----------( 174      ( 04 Apr 2017 16:31 )             25.0     04 Apr 2017 16:31    134    |  87     |  86.0   ----------------------------<  102    4.0     |  34.0   |  3.47     Ca    8.6        04 Apr 2017 16:31  Phos  4.7       04 Apr 2017 16:31  Mg     2.3       04 Apr 2017 16:31    TPro  6.1    /  Alb  3.2    /  TBili  0.4    /  DBili  x      /  AST  115    /  ALT  111    /  AlkPhos  111    04 Apr 2017 16:31    CARDIAC MARKERS ( 04 Apr 2017 21:56 )  x     / 0.26 ng/mL / x     / x     / x      CARDIAC MARKERS ( 04 Apr 2017 16:31 )  x     / 0.24 ng/mL / x     / x     / x          PT/INR - ( 04 Apr 2017 06:09 )   PT: 14.5 sec;   INR: 1.31 ratio         PTT - ( 03 Apr 2017 07:25 )  PTT:32.0 sec  Serum Pro-Brain Natriuretic Peptide: 71063 pg/mL (04-01 @ 07:32)  serum  Lipids:   Hemoglobin A1C, Whole Blood: 8.9 % (03-18 @ 06:25)      CARDIAC CATHETERIZATION: DIAGNOSTIC IMPRESSIONS:   - Severe 2 vessel CAD  - The RCA is occluded chronically with septal-septal collaterals from the LAD  - The Ramus serves a medium-large territory, but is heavily calcifed with a complex stenosis  - The LAD is patent without significant stenosis  - LVEDP elevated at 27mmHg    ASSESSMENT AND PLAN:  76y Female with past medical history significant for cardiac arrest, acute systolic CHF exacerbation, ARF/CRI, mild troponin increase (peak 0.4), CM (EF 30-35%), s/p RHC 3/27/17->severe pulmonary HTN/PCWP 30, s/p PAF->NSR, IDDM, HTN, HL, asthma    - New severe CM. 2V CAD. Occluded RCA with collaterals. Ramus serves a medium-large territory, but is heavily calcifed with a complex stenosis. The LAD is patent without significant stenosis.  - LVEDP elevated at 27mmHg (remains elevated despite diuresis)  - Echocardiogram 3/17/17 demonstrated moderately to severely decreased LV fxn (EF 30-35%), + SWMA, mild MR, trace TR, mild pulmonary HTN (RVSP 44.3 mmHg), ASA, trivial pericardial effusion; wall motion defects correspond to location of severe CAD, but suspect that EF is disproportionately low compared to distribution/severity of CAD.  - IV diuresis in place with Lasix 80 mg IV BID for now, renal function stable/worsening.  Monitor strict I/Os, daily weights & BUN/Cr closely and titrate PRN.  Nephrology follow-up.  - Rhythm/hemodynamics stable = continue current doses of Coreg 6.25 bid, Hydralazine 25 tid & Imdur 30 daily for now and titrate PRN  - ASA 81 & Lipitor 20 daily in place  - Patient had PAF now in NSR with an increased [id number] risk score.  She is currently on AC with Lovenox renally dosed 1 mg/kg SQ daily.  Resume coumadin when all procedures complete.  - Glycemic control per endocrine  - DW renal, Dr. Baird.  Baseline CKD trending toward ESRD/HD prior to admission.  Plan is for temporary HD to optimize fluid status.  Discussed role of inotrope assisted diuresis as adjunct vs. alternative.  Will defer for now.    Kavon Dillard MD

## 2017-04-05 NOTE — PROCEDURE NOTE - NSPROCDETAILS_GEN_ALL_CORE
guidewire recovered/sterile dressing applied/lumen(s) aspirated and flushed/ultrasound guidance/sterile technique, catheter placed

## 2017-04-05 NOTE — PROCEDURE NOTE - NSPOSTCAREGUIDE_GEN_A_CORE
Keep the cast/splint/dressing clean and dry/Verbal/written post procedure instructions were given to patient/caregiver/Care for catheter as per unit/ICU protocols

## 2017-04-05 NOTE — PROGRESS NOTE ADULT - SUBJECTIVE AND OBJECTIVE BOX
INTERVAL HPI/OVERNIGHT EVENTS:    CC: acute systolic CHF, severe CAD, acute on chronic renal failure, diabetes mellitus    Had groin access placed this am for HD. States mildly short of breath, no chest pain. Anxious about HD.     Vital Signs Last 24 Hrs  T(C): 37.1, Max: 37.1 (04-04 @ 18:33)  T(F): 98.8, Max: 98.8 (04-05 @ 11:30)  HR: 101 (70 - 101)  BP: 106/54 (81/43 - 118/69)  BP(mean): --  RR: 18 (16 - 18)  SpO2: 100% (95% - 100%)    PHYSICAL EXAM:    GENERAL: Sitting in bed, alert, no distress  CHEST/LUNG: b/l air entry, decreased in bases  HEART: irregular  ABDOMEN: Soft, BS+  EXTREMITIES:  2+ edema, non tender    MEDICATIONS  (STANDING):  aspirin  chewable 81milliGRAM(s) Oral daily  atorvastatin 20milliGRAM(s) Oral at bedtime  ALBUTerol/ipratropium for Nebulization 3milliLiter(s) Nebulizer every 6 hours  lidocaine   Patch 1Patch Transdermal daily  pantoprazole    Tablet 40milliGRAM(s) Oral before breakfast  famotidine    Tablet 20milliGRAM(s) Oral at bedtime  docusate sodium 100milliGRAM(s) Oral three times a day  senna 2Tablet(s) Oral at bedtime  dextrose 5%. 1000milliLiter(s) IV Continuous <Continuous>  guaiFENesin    Syrup 100milliGRAM(s) Oral every 4 hours  sodium biphosphate Rectal Enema 1Enema Rectal once  hydrALAZINE 25milliGRAM(s) Oral every 8 hours  isosorbide   mononitrate ER Tablet (IMDUR) 30milliGRAM(s) Oral daily  dextrose 5% + sodium chloride 0.9%. 1000milliLiter(s) IV Continuous <Continuous>  metolazone 2.5milliGRAM(s) Oral daily  torsemide 10milliGRAM(s) Oral daily  insulin glargine Injectable (LANTUS) 18Unit(s) SubCutaneous at bedtime  insulin lispro Injectable (HumaLOG) 10Unit(s) SubCutaneous before breakfast  insulin lispro Injectable (HumaLOG) 10Unit(s) SubCutaneous before lunch  insulin lispro Injectable (HumaLOG) 10Unit(s) SubCutaneous before dinner  insulin lispro (HumaLOG) corrective regimen sliding scale  SubCutaneous three times a day before meals  dextrose 5%. 1000milliLiter(s) IV Continuous <Continuous>  dextrose 50% Injectable 12.5Gram(s) IV Push once  dextrose 50% Injectable 25Gram(s) IV Push once  dextrose 50% Injectable 25Gram(s) IV Push once  epoetin conrado Injectable 65089Doar(s) IV Push <User Schedule>  enoxaparin Injectable 70milliGRAM(s) SubCutaneous daily  carvedilol 12.5milliGRAM(s) Oral every 12 hours    MEDICATIONS  (PRN):  ALBUTerol    0.083% 2.5milliGRAM(s) Nebulizer every 3 hours PRN Shortness of Breath and/or Wheezing  acetaminophen   Tablet. 650milliGRAM(s) Oral every 6 hours PRN Moderate Pain (4 - 6)  aluminum hydroxide/magnesium hydroxide/simethicone Suspension 30milliLiter(s) Oral every 6 hours PRN Dyspepsia  metoprolol Injectable 5milliGRAM(s) IV Push every 15 minutes PRN for HR greater than 120 sustained a-fib  oxyCODONE  5 mG/acetaminophen 325 mG 2Tablet(s) Oral every 6 hours PRN Severe Pain (7 - 10)  dextrose Gel 1Dose(s) Oral once PRN Blood Glucose LESS THAN 70 milliGRAM(s)/deciliter  glucagon  Injectable 1milliGRAM(s) IntraMuscular once PRN Glucose LESS THAN 70 milligrams/deciliter      Allergies    IV Contrast (Unknown)    Intolerances          LABS:                          8.1    7.8   )-----------( 174      ( 04 Apr 2017 16:31 )             25.0     04-04    134<L>  |  87<L>  |  86.0<H>  ----------------------------<  102  4.0   |  34.0<H>  |  3.47<H>    Ca    8.6      04 Apr 2017 16:31  Phos  4.7     04-04  Mg     2.3     04-04    TPro  6.1<L>  /  Alb  3.2<L>  /  TBili  0.4  /  DBili  x   /  AST  115<H>  /  ALT  111<H>  /  AlkPhos  111  04-04    PT/INR - ( 04 Apr 2017 06:09 )   PT: 14.5 sec;   INR: 1.31 ratio               RADIOLOGY & ADDITIONAL TESTS:

## 2017-04-06 NOTE — PROGRESS NOTE ADULT - PROVIDER SPECIALTY LIST ADULT
Cardiology
Critical Care
Endocrinology
Hospitalist
Internal Medicine
Intervent Cardiology
Intervent Cardiology
Nephrology
Palliative Care
Pulmonology
Endocrinology
Family Medicine
Nephrology

## 2017-04-06 NOTE — PROGRESS NOTE ADULT - ASSESSMENT
76 yr woman with hypoglycemia BS- 22,  admitted s/p cardiac arrest, CPR administered, required temporary intubation,  with eventual  ROSC, CHF, with SENTHIL, EF 30-35%, found to have severe pulm HTN and 2 vessel CAD

## 2017-04-06 NOTE — PROGRESS NOTE ADULT - SUBJECTIVE AND OBJECTIVE BOX
INTERVAL HPI/OVERNIGHT EVENTS:  Receiving HD - She states doesn't like it but accepts it.   Transdermal nitro started    PRESENT SYMPTOMS: SOURCE:  Patient/Family/Team    PAIN SCALE:  0 = none  1 = mild   2 = moderate  3 = severe    Pain: 1    Dyspnea:  [ ] YES [x ] NO  Anxiety:  [ ] YES [ ] NO  Fatigue: [ x] YES [ ] NO  Nausea: [ ] YES [x ] NO  Loss of Appetite: [ ] YES [x ] NO - ate good amount of her breakfast  Other symptoms: __________    MEDICATIONS  (STANDING):  aspirin  chewable 81milliGRAM(s) Oral daily  atorvastatin 20milliGRAM(s) Oral at bedtime  ALBUTerol/ipratropium for Nebulization 3milliLiter(s) Nebulizer every 6 hours  lidocaine   Patch 1Patch Transdermal daily  pantoprazole    Tablet 40milliGRAM(s) Oral before breakfast  famotidine    Tablet 20milliGRAM(s) Oral at bedtime  docusate sodium 100milliGRAM(s) Oral three times a day  senna 2Tablet(s) Oral at bedtime  dextrose 5%. 1000milliLiter(s) IV Continuous <Continuous>  guaiFENesin    Syrup 100milliGRAM(s) Oral every 4 hours  sodium biphosphate Rectal Enema 1Enema Rectal once  hydrALAZINE 25milliGRAM(s) Oral every 8 hours  isosorbide   mononitrate ER Tablet (IMDUR) 30milliGRAM(s) Oral daily  dextrose 5% + sodium chloride 0.9%. 1000milliLiter(s) IV Continuous <Continuous>  metolazone 2.5milliGRAM(s) Oral daily  torsemide 10milliGRAM(s) Oral daily  insulin glargine Injectable (LANTUS) 18Unit(s) SubCutaneous at bedtime  insulin lispro Injectable (HumaLOG) 10Unit(s) SubCutaneous before breakfast  insulin lispro Injectable (HumaLOG) 10Unit(s) SubCutaneous before lunch  insulin lispro Injectable (HumaLOG) 10Unit(s) SubCutaneous before dinner  insulin lispro (HumaLOG) corrective regimen sliding scale  SubCutaneous three times a day before meals  dextrose 5%. 1000milliLiter(s) IV Continuous <Continuous>  dextrose 50% Injectable 12.5Gram(s) IV Push once  dextrose 50% Injectable 25Gram(s) IV Push once  dextrose 50% Injectable 25Gram(s) IV Push once  epoetin conrado Injectable 78027Tgtz(s) IV Push <User Schedule>  enoxaparin Injectable 70milliGRAM(s) SubCutaneous daily  carvedilol 12.5milliGRAM(s) Oral every 12 hours  morphine  - Injectable 2milliGRAM(s) IV Push once  nitroglycerin    Patch 0.3 mG/Hr(s) 1patch Transdermal daily    MEDICATIONS  (PRN):  ALBUTerol    0.083% 2.5milliGRAM(s) Nebulizer every 3 hours PRN Shortness of Breath and/or Wheezing  acetaminophen   Tablet. 650milliGRAM(s) Oral every 6 hours PRN Moderate Pain (4 - 6)  aluminum hydroxide/magnesium hydroxide/simethicone Suspension 30milliLiter(s) Oral every 6 hours PRN Dyspepsia  metoprolol Injectable 5milliGRAM(s) IV Push every 15 minutes PRN for HR greater than 120 sustained a-fib  dextrose Gel 1Dose(s) Oral once PRN Blood Glucose LESS THAN 70 milliGRAM(s)/deciliter  glucagon  Injectable 1milliGRAM(s) IntraMuscular once PRN Glucose LESS THAN 70 milligrams/deciliter      Allergies    IV Contrast (Unknown)    Intolerances        Karnofsky Performance Score/Palliative Performance Status Version 2:   40 %    Vital Signs Last 24 Hrs  T(C): 37.2, Max: 37.2 (04-06 @ 11:30)  T(F): 99, Max: 99 (04-06 @ 11:30)  HR: 93 (77 - 93)  BP: 104/60 (101/47 - 125/61)  BP(mean): --  RR: 18 (18 - 18)  SpO2: 100% (100% - 100%)    PHYSICAL EXAM:    General: [x ] alert  [ ] oriented x ____ [ ] lethargic [ ] agitated                  [ ] cachexia  [ ] nonverbal  [ ] coma    HEENT: [x ] normal  [ ] dry mouth  [ ] ET tube/trach    Lungs: [x ] comfortable [ ] tachypnea/labored breathing  [ ] excessive secretions    CV: [ x] normal  [ ] tachycardia    GI: [x ] normal  [ ] distended  [ ] tender  [ ] no BS               [ ] PEG/NG/OG tube    MSK: [ ] normal  [x ] weakness  [ ] edema             [ ] ambulatory  [ ] bedbound/wheelchair bound    Skin: [ ] normal  [ ] pressure ulcers- Stage_____  [ x] no rash    LABS:                        8.4    5.9   )-----------( 154      ( 06 Apr 2017 07:33 )             26.0     04-06    136  |  89<L>  |  84.0<H>  ----------------------------<  158<H>  4.7   |  31.0<H>  |  3.47<H>    Ca    9.0      06 Apr 2017 07:33  Phos  4.6     04-05  Mg     2.2     04-06    TPro  6.1<L>  /  Alb  3.2<L>  /  TBili  0.4  /  DBili  x   /  AST  115<H>  /  ALT  111<H>  /  AlkPhos  111  04-04        I&O's Summary  I & Os for 24h ending 06 Apr 2017 07:00  =============================================  IN: 1256 ml / OUT: 2050 ml / NET: -794 ml    I & Os for current day (as of 06 Apr 2017 11:50)  =============================================  IN: 240 ml / OUT: 350 ml / NET: -110 ml    ith the care of this patient.   [facility] [phone].

## 2017-04-06 NOTE — PROGRESS NOTE ADULT - PROBLEM SELECTOR PROBLEM 1
Acute systolic heart failure
Cardiac arrest
Cardiopulmonary arrest with successful resuscitation
Chronic congestive heart failure, unspecified congestive heart failure type
Chronic congestive heart failure, unspecified congestive heart failure type
Type 2 diabetes mellitus with nephropathy

## 2017-04-06 NOTE — PROGRESS NOTE ADULT - ATTENDING COMMENTS
Adjusted diuretics added spa if not effective will need inotropes or HD
Cardiac cath tomorrow, Mucomyst ordered will monitor labs post
Cath today, labs in am, increase lasix
I discussed dialysis with pt and son today and they agree to proceed and both  gave consent, vas surgery called to place line and orders placed. pt will need more than 1 treatment.
IV lasix, ? Cath.
Pt at high risk for renal failure with iv dye and may therefore need dialysis, Suggest medical management.
Plan of care discussed with patient and RN.
Plan of care discussed with patient, RN and endocrine.
Plan of care discussed with patient.
Dispo: Pending possible L heart cath on monday if stable from renal point of view.
Palliative/Ethics:  updated Son Kristian this morning; aware of poor reserve to tolerate events as this, however hopeful that she will recover from it.     RASS 0  CAM-ICU negative  Sedation: none  VENT Bundle Reviewed:   Glycemic Control: NISS, long acting  DVT PPX: heparin 5000units q12  Nutrition: PO if tolerated  PT/OT: ordered    Invasive Lines/Corral:   3/17- corral- cardiogenic shock (to be d/c'd)  3/18- tlc- infusion of dobutamine: c/d/i  3/18- arterial line- titration of inotropes and frequent blood draws: c/d/i
Plan of care discussed with patient and RN.

## 2017-04-06 NOTE — PROGRESS NOTE ADULT - SUBJECTIVE AND OBJECTIVE BOX
[facility], [facility]                                                    [address], [address], [place] [address]                                                         PHONE: [phone]    FAX: [phone] [address], [address], [place] [address]                                                 PHONE: [phone]    FAX: [phone]  *******************************************************************************    Overnight events/Subjective Assessment:    INTERPRETATION OF TELEMETRY (personally reviewed):    IV Contrast (Unknown)    MEDICATIONS  (STANDING):  aspirin  chewable 81milliGRAM(s) Oral daily  atorvastatin 20milliGRAM(s) Oral at bedtime  ALBUTerol/ipratropium for Nebulization 3milliLiter(s) Nebulizer every 6 hours  lidocaine   Patch 1Patch Transdermal daily  pantoprazole    Tablet 40milliGRAM(s) Oral before breakfast  famotidine    Tablet 20milliGRAM(s) Oral at bedtime  docusate sodium 100milliGRAM(s) Oral three times a day  senna 2Tablet(s) Oral at bedtime  dextrose 5%. 1000milliLiter(s) IV Continuous <Continuous>  guaiFENesin    Syrup 100milliGRAM(s) Oral every 4 hours  sodium biphosphate Rectal Enema 1Enema Rectal once  hydrALAZINE 25milliGRAM(s) Oral every 8 hours  isosorbide   mononitrate ER Tablet (IMDUR) 30milliGRAM(s) Oral daily  dextrose 5% + sodium chloride 0.9%. 1000milliLiter(s) IV Continuous <Continuous>  metolazone 2.5milliGRAM(s) Oral daily  torsemide 10milliGRAM(s) Oral daily  insulin glargine Injectable (LANTUS) 18Unit(s) SubCutaneous at bedtime  insulin lispro Injectable (HumaLOG) 10Unit(s) SubCutaneous before breakfast  insulin lispro Injectable (HumaLOG) 10Unit(s) SubCutaneous before lunch  insulin lispro Injectable (HumaLOG) 10Unit(s) SubCutaneous before dinner  insulin lispro (HumaLOG) corrective regimen sliding scale  SubCutaneous three times a day before meals  dextrose 5%. 1000milliLiter(s) IV Continuous <Continuous>  dextrose 50% Injectable 12.5Gram(s) IV Push once  dextrose 50% Injectable 25Gram(s) IV Push once  dextrose 50% Injectable 25Gram(s) IV Push once  epoetin conrado Injectable 06054Pbuc(s) IV Push <User Schedule>  enoxaparin Injectable 70milliGRAM(s) SubCutaneous daily  carvedilol 12.5milliGRAM(s) Oral every 12 hours  morphine  - Injectable 2milliGRAM(s) IV Push once    MEDICATIONS  (PRN):  ALBUTerol    0.083% 2.5milliGRAM(s) Nebulizer every 3 hours PRN Shortness of Breath and/or Wheezing  acetaminophen   Tablet. 650milliGRAM(s) Oral every 6 hours PRN Moderate Pain (4 - 6)  aluminum hydroxide/magnesium hydroxide/simethicone Suspension 30milliLiter(s) Oral every 6 hours PRN Dyspepsia  metoprolol Injectable 5milliGRAM(s) IV Push every 15 minutes PRN for HR greater than 120 sustained a-fib  dextrose Gel 1Dose(s) Oral once PRN Blood Glucose LESS THAN 70 milliGRAM(s)/deciliter  glucagon  Injectable 1milliGRAM(s) IntraMuscular once PRN Glucose LESS THAN 70 milligrams/deciliter      Vital Signs Last 24 Hrs  T(C): 37.1, Max: 37.1 (04-05 @ 11:30)  T(F): 98.7, Max: 98.8 (04-05 @ 11:30)  HR: 77 (77 - 101)  BP: 112/54 (101/47 - 125/61)  BP(mean): --  RR: 18 (18 - 18)  SpO2: 100% (100% - 100%)    I&O's Detail    I & Os for current day (as of 06 Apr 2017 08:20)  =============================================  IN:    Oral Fluid: 606 ml    Other: 600 ml    Solution: 50 ml    Total IN: 1256 ml  ---------------------------------------------  OUT:    Voided: 1050 ml    Other: 1000 ml    Total OUT: 2050 ml  ---------------------------------------------  Total NET: -794 ml    I&O's Summary    I & Os for current day (as of 06 Apr 2017 08:20)  =============================================  IN: 1256 ml / OUT: 2050 ml / NET: -794 ml          PHYSICAL EXAM:  General: Appears well developed, well nourished, no acute distress  HEENT: Head: normocephalic, atraumatic  Eyes: Pupils equal and reactive  Neck: Supple, no carotid bruit, no JVD, no HJR  CARDIOVASCULAR: Normal S1 and S2, II/VI murmur, No rub, or gallop  LUNGS: Clear to auscultation bilaterally, no rales, rhonchi or wheeze  ABDOMEN: Soft, nontender, non-distended, positive bowel sounds, no mass or bruit  EXTREMITIES: No edema, distal pulses WNL  SKIN: Warm and dry with normal turgor  NEURO: Alert & oriented x 3, grossly intact  PSYCH: normal mood and affect        LABS:                        8.4    5.9   )-----------( 154      ( 06 Apr 2017 07:33 )             26.0     04-06    136  |  89<L>  |  84.0<H>  ----------------------------<  158<H>  4.7   |  31.0<H>  |  3.47<H>    Ca    9.0      06 Apr 2017 07:33  Phos  4.6     04-05  Mg     2.2     04-06    TPro  6.1<L>  /  Alb  3.2<L>  /  TBili  0.4  /  DBili  x   /  AST  115<H>  /  ALT  111<H>  /  AlkPhos  111  04-04    CARDIAC MARKERS ( 04 Apr 2017 21:56 )  x     / 0.26 ng/mL / x     / x     / x      CARDIAC MARKERS ( 04 Apr 2017 16:31 )  x     / 0.24 ng/mL / x     / x     / x            Serum Pro-Brain Natriuretic Peptide: 19602 pg/mL (04-01 @ 07:32)  serum  Lipids:   Hemoglobin A1C, Whole Blood: 8.9 % (03-18 @ 06:25)        RADIOLOGY & ADDITIONAL STUDIES:    CARDIAC CATHETERIZATION: DIAGNOSTIC IMPRESSIONS:   - Severe 2 vessel CAD  - The RCA is occluded chronically with septal-septal collaterals from the LAD  - The Ramus serves a medium-large territory, but is heavily calcifed with a complex stenosis  - The LAD is patent without significant stenosis  - LVEDP elevated at 27mmHg    ASSESSMENT AND PLAN:  In summary, SOCORRO JARVIS is a 76y Female with past medical history significant for cardiac arrest, acute systolic CHF exacerbation, ARF/CRI, mild troponin increase (peak 0.4), CM (EF 30-35%), s/p RHC 3/27/17->severe pulmonary HTN/PCWP 30, s/p PAF->NSR, IDDM, HTN, HL, asthma    - New severe CM. 2V CAD. Occluded RCA with collaterals. Ramus serves a medium-large territory, but is heavily calcifed with a complex stenosis. The LAD is patent without significant stenosis.  - LVEDP elevated at 27mmHg (remains elevated despite diuresis). Dialysis now initiated. Pt reports respiratory status is improved  - Echocardiogram 3/17/17 demonstrated moderately to severely decreased LV fxn (EF 30-35%), + SWMA, mild MR, trace TR, mild pulmonary HTN (RVSP 44.3 mmHg), ASA, trivial pericardial effusion; wall motion defects correspond to location of severe CAD, but suspect that EF is disproportionately low compared to distribution/severity of CAD.  - IV diuresis in place with Lasix 80 mg IV BID for now, renal function stable/worsening.  Monitor strict I/Os, daily weights & BUN/Cr closely and titrate PRN.  Nephrology follow-up.  - Rhythm/hemodynamics stable = continue current doses of Coreg 6.25 bid, Hydralazine 25 tid & Imdur 30 daily for now and titrate PRN  - ASA 81 & Lipitor 20 daily in place  - Patient had PAF now in NSR with an increased [id number] risk score.  She is currently on AC with Lovenox renally dosed 1 mg/kg SQ daily.  Resume coumadin when all procedures complete.  - Glycemic control per endocrine  - DW renal, Dr. Baird.  Baseline CKD trending toward ESRD/HD prior to admission.  Plan is for temporary HD to optimize fluid status.  Defer inotrope assisted diuresis as adjunct vs. alternative for now. Pt reports respiratory status is improved      Monet Ovalles MD

## 2017-04-06 NOTE — PROGRESS NOTE ADULT - ASSESSMENT
CRF(late III-early IV): s/p cardiac and respiratory arrest  ARF due to ATN and pre renal azotemia==> progressive with fluid overload  - avoid potential nephrotoxic agents  - diuretics   -  Hd again today with UF as tolerates    Anemia: + CRF  - added JENNI

## 2017-04-06 NOTE — PROGRESS NOTE ADULT - SUBJECTIVE AND OBJECTIVE BOX
NEPHROLOGY INTERVAL HPI/OVERNIGHT EVENTS:  pt clinically stable  tolerated initial HD yesterday     MEDICATIONS  (STANDING):  aspirin  chewable 81milliGRAM(s) Oral daily  atorvastatin 20milliGRAM(s) Oral at bedtime  ALBUTerol/ipratropium for Nebulization 3milliLiter(s) Nebulizer every 6 hours  lidocaine   Patch 1Patch Transdermal daily  pantoprazole    Tablet 40milliGRAM(s) Oral before breakfast  famotidine    Tablet 20milliGRAM(s) Oral at bedtime  docusate sodium 100milliGRAM(s) Oral three times a day  senna 2Tablet(s) Oral at bedtime  dextrose 5%. 1000milliLiter(s) IV Continuous <Continuous>  guaiFENesin    Syrup 100milliGRAM(s) Oral every 4 hours  sodium biphosphate Rectal Enema 1Enema Rectal once  hydrALAZINE 25milliGRAM(s) Oral every 8 hours  isosorbide   mononitrate ER Tablet (IMDUR) 30milliGRAM(s) Oral daily  dextrose 5% + sodium chloride 0.9%. 1000milliLiter(s) IV Continuous <Continuous>  metolazone 2.5milliGRAM(s) Oral daily  torsemide 10milliGRAM(s) Oral daily  insulin glargine Injectable (LANTUS) 18Unit(s) SubCutaneous at bedtime  insulin lispro Injectable (HumaLOG) 10Unit(s) SubCutaneous before breakfast  insulin lispro Injectable (HumaLOG) 10Unit(s) SubCutaneous before lunch  insulin lispro Injectable (HumaLOG) 10Unit(s) SubCutaneous before dinner  insulin lispro (HumaLOG) corrective regimen sliding scale  SubCutaneous three times a day before meals  dextrose 5%. 1000milliLiter(s) IV Continuous <Continuous>  dextrose 50% Injectable 12.5Gram(s) IV Push once  dextrose 50% Injectable 25Gram(s) IV Push once  dextrose 50% Injectable 25Gram(s) IV Push once  epoetin conrado Injectable [id number](s) IV Push <User Schedule>  enoxaparin Injectable 70milliGRAM(s) SubCutaneous daily  carvedilol 12.5milliGRAM(s) Oral every 12 hours  morphine  - Injectable 2milliGRAM(s) IV Push once  nitroglycerin    Patch 0.3 mG/Hr(s) 1patch Transdermal daily    MEDICATIONS  (PRN):  ALBUTerol    0.083% 2.5milliGRAM(s) Nebulizer every 3 hours PRN Shortness of Breath and/or Wheezing  acetaminophen   Tablet. 650milliGRAM(s) Oral every 6 hours PRN Moderate Pain (4 - 6)  aluminum hydroxide/magnesium hydroxide/simethicone Suspension 30milliLiter(s) Oral every 6 hours PRN Dyspepsia  metoprolol Injectable 5milliGRAM(s) IV Push every 15 minutes PRN for HR greater than 120 sustained a-fib  dextrose Gel 1Dose(s) Oral once PRN Blood Glucose LESS THAN 70 milliGRAM(s)/deciliter  glucagon  Injectable 1milliGRAM(s) IntraMuscular once PRN Glucose LESS THAN 70 milligrams/deciliter      Allergies    IV Contrast (Unknown)          Vital Signs Last 24 Hrs  T(C): 37.1, Max: 37.1 (04-05 @ 11:30)  T(F): 98.7, Max: 98.8 (04-05 @ 11:30)  HR: 77 (77 - 101)  BP: 112/54 (101/47 - 125/61)  BP(mean): --  RR: 18 (18 - 18)  SpO2: 100% (100% - 100%)    PHYSICAL EXAM:  GENERAL: NAD, generalized weakness  HEAD:  Atraumatic, Normocephalic  EYES: EOMI, PERRLA, conjunctiva and sclera clear  NECK: Supple, No JVD, Normal thyroid  NERVOUS SYSTEM:  Alert & Oriented X3,  intact and symmetric  CHEST/LUNG: Diminished BS at bases  HEART: Regular rate and rhythm; No rub  ABDOMEN: Soft, Nontender, Nondistended; Bowel sounds present  EXTREMITIES:  2+ Peripheral Pulses,   LYMPH: No lymphadenopathy noted  SKIN: No rashes or lesions      LABS:                        8.4    5.9   )-----------( 154      ( 06 Apr 2017 07:33 )             26.0     04-06    136  |  89<L>  |  84.0<H>  ----------------------------<  158<H>  4.7   |  31.0<H>  |  3.47<H>    Ca    9.0      06 Apr 2017 07:33  Phos  4.6     04-05  Mg     2.2     04-06    TPro  6.1<L>  /  Alb  3.2<L>  /  TBili  0.4  /  DBili  x   /  AST  115<H>  /  ALT  111<H>  /  AlkPhos  111  04-04        Magnesium, Serum: 2.2 mg/dL (04-06 @ 07:33)  Phosphorus Level, Serum: 4.6 mg/dL (04-05 @ 16:37)      RADIOLOGY & ADDITIONAL TESTS:

## 2017-04-06 NOTE — PROGRESS NOTE ADULT - PROBLEM SELECTOR PROBLEM 2
Acute on chronic kidney failure
Congestive heart failure
Congestive heart failure
Acute on chronic kidney failure
Chronic congestive heart failure, unspecified congestive heart failure type
Respiratory distress
SENTHIL (acute kidney injury)
Sternal pain
Acute on chronic kidney failure

## 2017-04-06 NOTE — PROGRESS NOTE ADULT - PROBLEM SELECTOR PLAN 3
-c/w lidocaine patch
Bronchodilators prn.
Improved, h/o asthma   -repeat cxr shows improvement - d/c abx  and patients pulm edema improved  -c/w tapering steroids   -c/w bronchodilators  -c/w lasix but will decrease dose to 40mg IV bid patients bp unable to tolerate
Supplemental O2
Supplemental O2
- flory on ckd; prerenal from shock state; will trend  -
Better - bowel regimen
Lactulose added.
Miralax ordered
Miralax ordered.
Patient tolerating HD. Plan to have further advance directives discussions  when son is present, since he is very involved in her care.
improved, continue O2 via NC
Improved, h/o asthma   -repeat cxr shows improvement - d/c abx  and patients pulm edema improved  -c/w tapering steroids   -c/w bronchodilators  -c/w lasix

## 2017-04-06 NOTE — PROGRESS NOTE ADULT - SUBJECTIVE AND OBJECTIVE BOX
INTERVAL HPI/OVERNIGHT EVENTS: pt seen - stated that she is anxious and nervous. has some abdominal pain. son was concerned about hypoglycemia and he refused Lantus last night.  also FS low 100's last night. ?PO intake    MEDICATIONS  (STANDING):  aspirin  chewable 81milliGRAM(s) Oral daily  atorvastatin 20milliGRAM(s) Oral at bedtime  ALBUTerol/ipratropium for Nebulization 3milliLiter(s) Nebulizer every 6 hours  lidocaine   Patch 1Patch Transdermal daily  pantoprazole    Tablet 40milliGRAM(s) Oral before breakfast  famotidine    Tablet 20milliGRAM(s) Oral at bedtime  docusate sodium 100milliGRAM(s) Oral three times a day  senna 2Tablet(s) Oral at bedtime  dextrose 5%. 1000milliLiter(s) IV Continuous <Continuous>  guaiFENesin    Syrup 100milliGRAM(s) Oral every 4 hours  sodium biphosphate Rectal Enema 1Enema Rectal once  hydrALAZINE 25milliGRAM(s) Oral every 8 hours  isosorbide   mononitrate ER Tablet (IMDUR) 30milliGRAM(s) Oral daily  dextrose 5% + sodium chloride 0.9%. 1000milliLiter(s) IV Continuous <Continuous>  metolazone 2.5milliGRAM(s) Oral daily  torsemide 10milliGRAM(s) Oral daily  insulin glargine Injectable (LANTUS) 18Unit(s) SubCutaneous at bedtime  insulin lispro Injectable (HumaLOG) 10Unit(s) SubCutaneous before breakfast  insulin lispro Injectable (HumaLOG) 10Unit(s) SubCutaneous before lunch  insulin lispro Injectable (HumaLOG) 10Unit(s) SubCutaneous before dinner  insulin lispro (HumaLOG) corrective regimen sliding scale  SubCutaneous three times a day before meals  dextrose 5%. 1000milliLiter(s) IV Continuous <Continuous>  dextrose 50% Injectable 12.5Gram(s) IV Push once  dextrose 50% Injectable 25Gram(s) IV Push once  dextrose 50% Injectable 25Gram(s) IV Push once  epoetin conrado Injectable [id number](s) IV Push <User Schedule>  enoxaparin Injectable 70milliGRAM(s) SubCutaneous daily  carvedilol 12.5milliGRAM(s) Oral every 12 hours  morphine  - Injectable 2milliGRAM(s) IV Push once  nitroglycerin    Patch 0.3 mG/Hr(s) 1patch Transdermal daily    MEDICATIONS  (PRN):  ALBUTerol    0.083% 2.5milliGRAM(s) Nebulizer every 3 hours PRN Shortness of Breath and/or Wheezing  acetaminophen   Tablet. 650milliGRAM(s) Oral every 6 hours PRN Moderate Pain (4 - 6)  aluminum hydroxide/magnesium hydroxide/simethicone Suspension 30milliLiter(s) Oral every 6 hours PRN Dyspepsia  metoprolol Injectable 5milliGRAM(s) IV Push every 15 minutes PRN for HR greater than 120 sustained a-fib  dextrose Gel 1Dose(s) Oral once PRN Blood Glucose LESS THAN 70 milliGRAM(s)/deciliter  glucagon  Injectable 1milliGRAM(s) IntraMuscular once PRN Glucose LESS THAN 70 milligrams/deciliter      Allergies: IV Contrast (Unknown)        Review of systems: denies CP/palp/SOB/N/V/D/C    Vital Signs Last 24 Hrs  T(C): 37.1, Max: 37.1 (04-05 @ 11:30)  T(F): 98.7, Max: 98.8 (04-05 @ 11:30)  HR: 77 (77 - 101)  BP: 112/54 (101/47 - 125/61)  BP(mean): --  RR: 18 (18 - 18)  SpO2: 100% (100% - 100%)    PHYSICAL EXAM:  Constitutional: NAD, well-developed  HEENT: Pupils equal, EOMI  Respiratory: CTAB no w/r/r  Cardiovascular: S1 and S2, RRR  Gastrointestinal: BS+, soft, mild tenderness, no rebound/guarding  Extremities: bilateral edema  Neurological: A/O x 3, no focal deficits  Psychiatric: Normal mood, normal affect  Skin: feet no ulcers        LABS:                        8.4    5.9   )-----------( 154      ( 06 Apr 2017 07:33 )             26.0     04-06    136  |  89<L>  |  84.0<H>  ----------------------------<  158<H>  4.7   |  31.0<H>  |  3.47<H>    Ca    9.0      06 Apr 2017 07:33  Phos  4.6     04-05  Mg     2.2     04-06    TPro  6.1<L>  /  Alb  3.2<L>  /  TBili  0.4  /  DBili  x   /  AST  115<H>  /  ALT  111<H>  /  AlkPhos  111  04-04          CAPILLARY BLOOD GLUCOSE  209 (06 Apr 2017 08:35)  100 (05 Apr 2017 22:07)  188 (05 Apr 2017 17:08)  171 (05 Apr 2017 11:35)  149 (05 Apr 2017 08:25)  121 (04 Apr 2017 22:08)  105 (04 Apr 2017 18:33)  302 (04 Apr 2017 11:57)  318 (04 Apr 2017 08:00)  367 (03 Apr 2017 21:12)  267 (03 Apr 2017 16:00)  403 (03 Apr 2017 12:13)  358 (03 Apr 2017 07:56)  181 (02 Apr 2017 20:00)  173 (02 Apr 2017 16:49)  188 (02 Apr 2017 11:45)      RADIOLOGY & ADDITIONAL TESTS:

## 2017-04-06 NOTE — DISCHARGE NOTE FOR THE EXPIRED PATIENT - HOSPITAL COURSE
76 F PMH of CAD, ESRD on HD, DM, HTN admitted s/p cardiac arrest, likely secondary to hypoglycemia induced hypercapnia (acute on chronic) from stupor causing increased myocardial demand. ROSC with return of mental status, therefore no modified hypothermia, intubated 3/17 extubated 3/18, was on dobutamine infusion for cardiogenic shock. Pt was transferred to medical service on 3/19/17 and followed by cardiology and nephrology. Patient was optimized for cardiac catheterization and patient/son wanted to pursue cardiac catheterization knowing risks involved with further decline in renal function and possible need for dialysis. CRF(late III-early IV) and ARF due to ATN and pre renal azotemia. Pt s/p R heart cath 3/27/17, L heart cath unable to be performed as she could not lay flat. She is on diuresis based on renal function. Cardiac cath scheduled for 4/3, showed severe 2 vessel CAD. Renal follow up was done, given clinical condition and renal function, HD initiated on 4/5/17.   Code Blue was called on 4/6 at 20:49 pm due to pt being unresponsive without a pulse. Pt was found to be in PEA and compressions were immediately, pt was given one amp of epinephrine and after 3 min. of compression pt had ROSC. However, after 3 minutes pt went back into PEA and ACLS protocol was resumed. After 23 minutes of unsuccessful resuscitation pt's son was called and CPR was discontinued as per his wishes. Pt was pronounced dead at 21:19. Pt's son declined autopsy.

## 2017-04-06 NOTE — PROGRESS NOTE ADULT - PROBLEM SELECTOR PROBLEM 7
Hypertension
Anemia due to chronic kidney disease
Cough
Hypertension
Prophylactic measure
Cough

## 2017-04-06 NOTE — PROGRESS NOTE ADULT - ASSESSMENT
76 year old female with T2DM a/w severe hypoglycemia, s/p cardiac arrest, acute systolic CHF exacerbation, ARF/CRI -HD started. s/p cardiac cath that showed 2 vessel disease with severe CM - medical management.  Avoid aggressive insulin in this pt which can cause hypoglycemia due to ARF/CKD, ?PO intake. Family refused Lantus last night - which may cause hyperglycemia today  - Cont Lantus 18 units at bedtime  - Suggest Humalog 8 units with meals and low dose scale (1  - will follow 76 year old female with T2DM a/w severe hypoglycemia, s/p cardiac arrest, acute systolic CHF exacerbation, ARF/CRI -HD started. s/p cardiac cath that showed 2 vessel disease with severe CM - medical management.  Avoid aggressive insulin in this pt which can cause hypoglycemia due to ARF/CKD, ?PO intake. Family refused Lantus last night - which may cause hyperglycemia today  - Cont Lantus 18 units at bedtime  - Suggest Humalog 8 units with meals and low dose scale (150-200 1 units, 201-250 2 units, 251-300 3 units, 301+ 4 units)  - cont glucose checks  - will follow

## 2017-04-06 NOTE — CHART NOTE - NSCHARTNOTEFT_GEN_A_CORE
Code Blue  PGY 3 Note  76 F PMH of CAD, ESRD on HD, DM, HTN admitted s/p cardiac arrest, likely secondary to hypoglycemia induced hypercapnia (acute on chronic) from stupor causing increased myocardial demand. ROSC with return of mental status, therefore no modified hypothermia, intubated 3/17 extubated 3/18, was on dobutamine infusion for cardiogenic shock. Pt was transferred to medical service on 3/19/17 and followed by cardiology and nephrology. Patient was optimized for cardiac catheterization and patient/son wanted to pursue cardiac catheterization knowing risks involved with further decline in renal function and possible need for dialysis. CRF(late III-early IV) and ARF due to ATN and pre renal azotemia. Pt s/p R heart cath 3/27/17, L heart cath unable to be performed as she could not lay flat. She is on diuresis based on renal function. Cardiac cath scheduled for 4/3, showed severe 2 vessel CAD. Renal follow up was done, given clinical condition and renal function, HD initiated on 4/5/17.     Code Blue was called today at 8 49 pm due to pt being unresponsive with a pulse. Pt was found to be in PEA and compressions were immediately, pt was given one amp of epinephrine and after 3 mins of compression pt had ROSC. Pt was at that moment verbalizing and asking what happened. she didn't require intubation at that time as she was verbal and SAO2 was in the 80's. One of the nurses that spoke Ukrainian came to try to explain what happened. After 3 mins of being responsive and only requiring ventimask for respiration pt was still responsive but HR started going in the 40's. pt was given 1 amp of atropine at the time and HR transiently went up to the 60's.. After a minute pt SA02 started to go down to the 50s and pt went back into PEA. CPR was resumed immediately at that time again and pt was intubated. 4 more amps of epi were given, 2 amps of bicarb given in between. 1 amp of  caCl and Cagluc given too as well. After continual CPR and ACLS protocol pts son gave us a call back and informed us that he wanted us to stop compressions. Everyone in code blue was in agreement and time of death was called at 9 19 PM. Family was in agreement, ICU ws present during code Blue.       Allergies    IV Contrast (Unknown)    Intolerances        PAST MEDICAL & SURGICAL HISTORY:  Diabetes  Kidney disease  Hypertension  No significant past surgical history      Vital Signs Last 24 Hrs  T(C): 36.7, Max: 37.2 (04-06 @ 11:30)  T(F): 98, Max: 99 (04-06 @ 11:30)  HR: 72 (70 - 93)  BP: 97/42 (97/42 - 124/58)  BP(mean): --  RR: 18 (18 - 18)  SpO2: 97% (94% - 100%)          GENERAL: The patient is awake and alert in no apparent distress.   HEENT: Head is normocephalic and atraumatic. Extraocular muscles are intact. Mucous membranes are moist. No throat erythema/exudates no lymphadenopathy, no JVD,   NECK: Supple.  LUNGS: Clear to auscultation BL without wheezing, rales or rhonchi; respirations unlabored  HEART: Regular rate and rhythm ,+S1/+S2, no murmurs, rubs, gallops  ABDOMEN: Soft, nontender, and nondistended, no rebound, guarding rigidity, bowel sounds in all 4 quadrants  EXTREMITIES: Without any cyanosis, clubbing, rash, lesions or edema.  SKIN: No new rashes or lesions.  MSK: strength equal BL  VASCULAR: Radial and Dorsal pedal pulses palpable BL  NEUROLOGIC: Grossly intact.  PSYCH: No new changes.  I & Os for 24h ending 04-06 @ 07:00  =============================================  IN: 1256 ml / OUT: 2050 ml / NET: -794 ml    I & Os for current day (as of 04-06 @ 21:44)  =============================================  IN: 240 ml / OUT: 1050 ml / NET: -810 ml                            8.4    5.9   )-----------( 154      ( 06 Apr 2017 07:33 )             26.0     06 Apr 2017 07:33    136    |  89     |  84.0   ----------------------------<  158    4.7     |  31.0   |  3.47     Ca    9.0        06 Apr 2017 07:33  Phos  4.6       05 Apr 2017 16:37  Mg     2.2       06 Apr 2017 07:33      ABG - ( 06 Apr 2017 21:03 )  pH: 7.20  /  pCO2: 72    /  pO2: 90    / HCO3: 24    / Base Excess: -0.8  /  SaO2: 95            Vital Signs Last 24 Hrs*       Assessment- Code Blue called on pt at 8 49 MPM due to PEA.  response to ROSC not requiring intubation from 8 52 pm to 8 57 pm. PEA occurred again at 8 58 pm requiring renting of ACLS protocol with intubation. Time of death was called 9 19. Son was in agreement with says he will come in to  see his mother. Hospitalist Dr Iqbal made aware by Dr. Boudreaux.     Plan-

## 2017-04-06 NOTE — PROGRESS NOTE ADULT - SUBJECTIVE AND OBJECTIVE BOX
INTERVAL HPI/OVERNIGHT EVENTS:    CC: Acute on chronic renal failure on HD, systolic CHF, severe 2 vessel CAD, diabetes mellitus    Had HD last night. States has on and off chest discomfort, occasionally relieved with Maalox.      Vital Signs Last 24 Hrs  T(C): 37.2, Max: 37.2 (04-06 @ 11:30)  T(F): 99, Max: 99 (04-06 @ 11:30)  HR: 93 (77 - 93)  BP: 104/60 (101/47 - 125/61)  BP(mean): --  RR: 18 (18 - 18)  SpO2: 100% (100% - 100%)    PHYSICAL EXAM:    GENERAL: Not in distress, alert  CHEST/LUNG: b/l air entry, decreased in bases  HEART: irregular  ABDOMEN: Soft, BS+  EXTREMITIES:  2+ edema    MEDICATIONS  (STANDING):  aspirin  chewable 81milliGRAM(s) Oral daily  atorvastatin 20milliGRAM(s) Oral at bedtime  ALBUTerol/ipratropium for Nebulization 3milliLiter(s) Nebulizer every 6 hours  lidocaine   Patch 1Patch Transdermal daily  pantoprazole    Tablet 40milliGRAM(s) Oral before breakfast  famotidine    Tablet 20milliGRAM(s) Oral at bedtime  docusate sodium 100milliGRAM(s) Oral three times a day  senna 2Tablet(s) Oral at bedtime  dextrose 5%. 1000milliLiter(s) IV Continuous <Continuous>  guaiFENesin    Syrup 100milliGRAM(s) Oral every 4 hours  sodium biphosphate Rectal Enema 1Enema Rectal once  hydrALAZINE 25milliGRAM(s) Oral every 8 hours  isosorbide   mononitrate ER Tablet (IMDUR) 30milliGRAM(s) Oral daily  dextrose 5% + sodium chloride 0.9%. 1000milliLiter(s) IV Continuous <Continuous>  metolazone 2.5milliGRAM(s) Oral daily  torsemide 10milliGRAM(s) Oral daily  insulin glargine Injectable (LANTUS) 18Unit(s) SubCutaneous at bedtime  insulin lispro Injectable (HumaLOG) 10Unit(s) SubCutaneous before breakfast  insulin lispro Injectable (HumaLOG) 10Unit(s) SubCutaneous before lunch  insulin lispro Injectable (HumaLOG) 10Unit(s) SubCutaneous before dinner  insulin lispro (HumaLOG) corrective regimen sliding scale  SubCutaneous three times a day before meals  dextrose 5%. 1000milliLiter(s) IV Continuous <Continuous>  dextrose 50% Injectable 12.5Gram(s) IV Push once  dextrose 50% Injectable 25Gram(s) IV Push once  dextrose 50% Injectable 25Gram(s) IV Push once  epoetin conrado Injectable [id number](s) IV Push <User Schedule>  enoxaparin Injectable 70milliGRAM(s) SubCutaneous daily  carvedilol 12.5milliGRAM(s) Oral every 12 hours  morphine  - Injectable 2milliGRAM(s) IV Push once  nitroglycerin    Patch 0.3 mG/Hr(s) 1patch Transdermal daily    MEDICATIONS  (PRN):  ALBUTerol    0.083% 2.5milliGRAM(s) Nebulizer every 3 hours PRN Shortness of Breath and/or Wheezing  acetaminophen   Tablet. 650milliGRAM(s) Oral every 6 hours PRN Moderate Pain (4 - 6)  aluminum hydroxide/magnesium hydroxide/simethicone Suspension 30milliLiter(s) Oral every 6 hours PRN Dyspepsia  metoprolol Injectable 5milliGRAM(s) IV Push every 15 minutes PRN for HR greater than 120 sustained a-fib  dextrose Gel 1Dose(s) Oral once PRN Blood Glucose LESS THAN 70 milliGRAM(s)/deciliter  glucagon  Injectable 1milliGRAM(s) IntraMuscular once PRN Glucose LESS THAN 70 milligrams/deciliter      Allergies    IV Contrast (Unknown)    Intolerances          LABS:                          8.4    5.9   )-----------( 154      ( 06 Apr 2017 07:33 )             26.0     04-06    136  |  89<L>  |  84.0<H>  ----------------------------<  158<H>  4.7   |  31.0<H>  |  3.47<H>    Ca    9.0      06 Apr 2017 07:33  Phos  4.6     04-05  Mg     2.2     04-06    TPro  6.1<L>  /  Alb  3.2<L>  /  TBili  0.4  /  DBili  x   /  AST  115<H>  /  ALT  111<H>  /  AlkPhos  111  04-04          RADIOLOGY & ADDITIONAL TESTS:

## 2017-04-06 NOTE — PROGRESS NOTE ADULT - PROBLEM SELECTOR PROBLEM 5
Atrial fibrillation
Diabetes
Prophylactic measure
Prophylactic measure
Diabetes mellitus of other type with oral complication, without long-term current use of insulin
Hypertension

## 2017-04-06 NOTE — PROGRESS NOTE ADULT - PROBLEM SELECTOR PROBLEM 6
Diabetes
Abnormal chest x-ray
Diabetes
Hypertension
Abnormal chest x-ray
Prophylactic measure

## 2017-04-06 NOTE — PROGRESS NOTE ADULT - PROBLEM SELECTOR PROBLEM 3
Asthma
Hypoxia
Respiratory distress
Sternal pain
Hypoxia
Constipation, unspecified constipation type
Encounter for palliative care
Respiratory distress
SENTHIL (acute kidney injury)
Respiratory distress

## 2017-04-06 NOTE — RESPIRATORY CARE EMERGENCY NOTE - CAC RESPIRATORY COMMENTS
patient intubated by Laura Carranza, Positive ETC02 color change yellow. manual resuscitation with ambu bag, assist with intubation

## 2019-03-02 NOTE — PROGRESS NOTE ADULT - PROBLEM SELECTOR PROBLEM 4
FONT COLOR=\"#[id number]\">MANDEEP GONZALEZ  : 1946 17:30:40  ACCOUNT:  [id number]  HOME PHONE:  [phone]  WORK PHONE:  [phone]    I received a email from Tamara King RN on 18 requesting for me to contact patient and schedule a Post Watchman BRENT. I have contacted the patient on two occasions and left messages on her voicemail to call the office and speak to me. Unable at this time to reach the patient.   Gerson White,  Please schedule Mandeep Gonzalez (44) for her 45 day post Watchman BRENT for the week of .  Dr Wilks did her pre BRENT.  Please let me know the date.  Thank you     Tamara Qureshi RN,BSN New Program/Structural Heart Coordinator  [address] [phone]  [email or web address]     Hypercarbia

## 2019-07-22 NOTE — ED ADULT NURSE NOTE - PAIN: PRESENCE, MLM
non-verbal indicator of pain/discomfort not present Clear bilaterally, pupils equal, round and reactive to light.

## 2021-01-24 NOTE — PROGRESS NOTE ADULT - SUBJECTIVE AND OBJECTIVE BOX
BRONCHOSPASM/BRONCHOCONSTRICTION     [x]         IMPROVE AERATION/BREATH SOUNDS  [x]   ADMINISTER BRONCHODILATOR THERAPY AS APPROPRIATE  [x]   ASSESS BREATH SOUNDS  []   IMPLEMENT AEROSOL/MDI PROTOCOL  [x]   PATIENT EDUCATION AS NEEDED Patient is a 76y old  Female who presents with a chief complaint of Unresponsive/Cardiac Arrest (17 Mar 2017 11:27)      HEALTH ISSUES - PROBLEM Dx:  Asthma: Asthma  Sternal pain: Sternal pain  Acute systolic heart failure: Acute systolic heart failure  Atrial fibrillation: Atrial fibrillation  Hypertension: Hypertension  Diabetes: Diabetes  Acute on chronic kidney failure: Acute on chronic kidney failure  Type 2 diabetes mellitus with nephropathy: Type 2 diabetes mellitus with nephropathy  Constipation, unspecified constipation type: Constipation, unspecified constipation type  Encounter for palliative care: Encounter for palliative care  Pain: Pain  Chronic congestive heart failure, unspecified congestive heart failure type: Chronic congestive heart failure, unspecified congestive heart failure type  Cough: Cough  Abnormal chest x-ray: Abnormal chest x-ray  Prophylactic measure: Prophylactic measure  Hypercarbia: Hypercarbia  Hypoxia: Hypoxia  Congestive heart failure: Congestive heart failure  Diabetes mellitus of other type with oral complication, without long-term current use of insulin: Diabetes mellitus of other type with oral complication, without long-term current use of insulin  Kidney disease: Kidney disease  Cardiopulmonary arrest with successful resuscitation: Cardiopulmonary arrest with successful resuscitation  Hypoglycemia: Hypoglycemia  SENTHIL (acute kidney injury): SENTHIL (acute kidney injury)  Respiratory distress: Respiratory distress  Cardiac arrest: Cardiac arrest      INTERVAL HPI/OVERNIGHT EVENTS:  Patient seen and examined at bedside. No acute events overnight. Patient states she is urinating often on the diuretic. Net output ~900ml. Pt has minimal left chest sternal pain 2/10, well controlled on medication. Patient denies SOB, abd pain, N/V, fever, chills, dysuria or any other complaints. All remainder ROS negative.     Vital Signs Last 24 Hrs  T(C): 36.9, Max: 36.9 (03-31 @ 05:16)  T(F): 98.5, Max: 98.5 (03-31 @ 08:09)  HR: 80 (80 - 84)  BP: 100/56 (90/52 - 115/55)  BP(mean): --  RR: 16 (16 - 19)  SpO2: 97% (97% - 100%)    CAPILLARY BLOOD GLUCOSE  236 (31 Mar 2017 07:53)  160 (30 Mar 2017 22:26)  126 (30 Mar 2017 17:08)  103 (30 Mar 2017 13:08)      I&O's Summary    I & Os for current day (as of 31 Mar 2017 12:17)  =============================================  IN: 900 ml / OUT: 575 ml / NET: 325 ml      CONSTITUTIONAL: Well appearing, well nourished, awake, alert and in no apparent distress  CARDIAC: Normal rate, regular rhythm.  Heart sounds S1, S2.  No murmurs, rubs or gallops, mild pain on palpation to sternum and left chest   RESPIRATORY: Decreased BS bilaterally. Minimal exp wheezing   ABDOMEN: Soft, NT ND BS+  EXTREMITIES: +2 b/l peripheral edema, no cyanosis or deformity   SKIN: No rash, chronic skin changes b/l le     MEDICATIONS  (STANDING):  dextrose 50% Injectable 12.5Gram(s) IV Push once  dextrose 50% Injectable 25Gram(s) IV Push once  dextrose 50% Injectable 25Gram(s) IV Push once  aspirin  chewable 81milliGRAM(s) Oral daily  atorvastatin 20milliGRAM(s) Oral at bedtime  insulin lispro (HumaLOG) corrective regimen sliding scale  SubCutaneous Before meals and at bedtime  ALBUTerol/ipratropium for Nebulization 3milliLiter(s) Nebulizer every 6 hours  lidocaine   Patch 1Patch Transdermal daily  pantoprazole    Tablet 40milliGRAM(s) Oral before breakfast  famotidine    Tablet 20milliGRAM(s) Oral at bedtime  docusate sodium 100milliGRAM(s) Oral three times a day  senna 2Tablet(s) Oral at bedtime  carvedilol 6.25milliGRAM(s) Oral every 12 hours  dextrose 5%. 1000milliLiter(s) IV Continuous <Continuous>  guaiFENesin    Syrup 100milliGRAM(s) Oral every 4 hours  enoxaparin Injectable 70milliGRAM(s) SubCutaneous daily  insulin glargine Injectable (LANTUS) 13Unit(s) SubCutaneous at bedtime  insulin lispro Injectable (HumaLOG) 8Unit(s) SubCutaneous three times a day before meals  epoetin conrado Injectable [id number](s) SubCutaneous <User Schedule>  sodium biphosphate Rectal Enema 1Enema Rectal once  hydrALAZINE 25milliGRAM(s) Oral every 8 hours    MEDICATIONS  (PRN):  dextrose Gel 1Dose(s) Oral once PRN Blood Glucose LESS THAN 70 milliGRAM(s)/deciLiter  glucagon  Injectable 1milliGRAM(s) IntraMuscular once PRN Glucose <70 milliGRAM(s)/deciLiter  ALBUTerol    0.083% 2.5milliGRAM(s) Nebulizer every 3 hours PRN Shortness of Breath and/or Wheezing  acetaminophen   Tablet. 650milliGRAM(s) Oral every 6 hours PRN Moderate Pain (4 - 6)  aluminum hydroxide/magnesium hydroxide/simethicone Suspension 30milliLiter(s) Oral every 6 hours PRN Dyspepsia  metoprolol Injectable 5milliGRAM(s) IV Push every 15 minutes PRN for HR greater than 120 sustained a-fib  oxyCODONE  5 mG/acetaminophen 325 mG 2Tablet(s) Oral every 6 hours PRN Severe Pain (7 - 10)      LABS:                        10.0   10.0  )-----------( 161      ( 31 Mar 2017 06:12 )             31.5     31 Mar 2017 06:12    138    |  91     |  78.0   ----------------------------<  219    4.8     |  36.0   |  2.59     Ca    9.1        31 Mar 2017 06:12  Mg     1.9       31 Mar 2017 06:12      PT/INR - ( 31 Mar 2017 06:12 )   PT: 11.6 sec;   INR: 1.05 ratio        RADIOLOGY & ADDITIONAL TESTS:  No new tests

## 2021-02-22 NOTE — PATIENT PROFILE ADULT. - NS PRO ABUSE SCREEN SUSPICION NEGLECT YN
Detail Level: Simple Additional Notes: Patient has TCA peel 1/2021. Will repea [facility] for $400 next visit and do face at no additional cost. Discussed core laser as well for chest Render Risk Assessment In Note?: no unable to assess

## 2021-07-14 NOTE — ED ADULT TRIAGE NOTE - NSWEIGHTCALCTOOLDRUG_GEN_A_CORE
Daughter states patient has been having hallucinations today. Seeing people that aren't  in the house. States she has not been eating or sleeping well    Mask placed on patient in triage. Triage staff wore appropriate PPE during interaction with patient.       used

## 2021-08-15 NOTE — PROGRESS NOTE ADULT - PROBLEM SELECTOR PLAN 1
For eventual cath as EF 30-35%. Sternal pain improved, continue Lidocaine patch Abdomen soft, non-tender and non-distended, no rebound, no guarding and no masses. no hepatosplenomegaly.

## 2023-01-06 NOTE — CONSULT NOTE ADULT - ASSESSMENT
[Never] : Never [2 - 4 times a month (2 pts)] : 2-4 times a month (2 points) [1 or 2 (0 pts)] : 1 or 2 (0 points) [Never (0 pts)] : Never (0 points) [No] : In the past 12 months have you used drugs other than those required for medical reasons? No 76 yr woman with hypoglycemia BS- 22,  admitted s/p cardiac arrest, CPR administered, required temporary intubation,  with eventual  ROSC. [0] : 2) Feeling down, depressed, or hopeless: Not at all (0) [PHQ-2 Negative - No further assessment needed] : PHQ-2 Negative - No further assessment needed [Yes] : Yes [No falls in past year] : Patient reported no falls in the past year [de-identified] : Glass of wine with dinner [Audit-CScore] : 2 [BTF1Agzbi] : 0 [Patient declined colonoscopy] : Patient declined colonoscopy [HIV test declined] : HIV test declined [Hepatitis C test declined] : Hepatitis C test declined [Fully functional (bathing, dressing, toileting, transferring, walking, feeding)] : Fully functional (bathing, dressing, toileting, transferring, walking, feeding) [Fully functional (using the telephone, shopping, preparing meals, housekeeping, doing laundry, using] : Fully functional and needs no help or supervision to perform IADLs (using the telephone, shopping, preparing meals, housekeeping, doing laundry, using transportation, managing medications and managing finances) [Reviewed no changes] : Reviewed, no changes [Designated Healthcare Proxy] : Designated healthcare proxy [Name: ___] : Health Care Proxy's Name: [unfilled]  [Relationship: ___] : Relationship: [unfilled] [I will adhere to the patient's wishes.] : I will adhere to the patient's wishes. [Time Spent: ___ minutes] : Time Spent: [unfilled] minutes [AdvancecareDate] : 1/6/23 [FreeTextEntry4] : Discussed with patient.  Suggested the discussion with the healthcare proxy about quality of life issues.  Suggested a discussion on withholding treatment and no further therapy in any given circumstance as determined by the patient.\par

## 2023-07-06 NOTE — CONSULT NOTE ADULT - CONSULT REASON
MA Rooming Questions  Patient: Ness Mejia  MRN: [id number]    Date: 7/6/2023        1. Do you have any new issues?   no         2. Do you need any refills on medications?    no    3. Have you had any imaging done since your last visit?   no    4. Have you been hospitalized or seen in the emergency room since your last visit here?   no    5. Did the patient have a depression screening completed today?  No    No data recorded     PHQ-9 Given to (if applicable):               PHQ-9 Score (if applicable):                     [] Positive     []  Negative              Does question #9 need addressed (if applicable)                     [] Yes    []  No               Tomy Soulier, CMA
CHF, Renal failure, eval for acute rehab
Hypercarbic Ventilatory Failure
s/p cardiac arrest
CRF
Uncontrolled DM

## 2023-09-06 NOTE — PROGRESS NOTE ADULT - SUBJECTIVE AND OBJECTIVE BOX
INTERVAL HISTORY: denies CP, SOB  	  MEDICATIONS:  carvedilol 6.25milliGRAM(s) Oral every 12 hours  cefTRIAXone   IVPB  IV Intermittent   azithromycin  IVPB  IV Intermittent   azithromycin  IVPB 500milliGRAM(s) IV Intermittent every 24 hours  cefTRIAXone   IVPB 1Gram(s) IV Intermittent every 24 hours  ALBUTerol/ipratropium for Nebulization 3milliLiter(s) Nebulizer every 6 hours  ALBUTerol    0.083% 2.5milliGRAM(s) Nebulizer every 3 hours PRN  HYDROmorphone  Injectable 0.25milliGRAM(s) IV Push every 4 hours PRN  acetaminophen   Tablet. 650milliGRAM(s) Oral every 6 hours PRN  pantoprazole    Tablet 40milliGRAM(s) Oral before breakfast  famotidine    Tablet 20milliGRAM(s) Oral at bedtime  docusate sodium 100milliGRAM(s) Oral three times a day  senna 2Tablet(s) Oral at bedtime  dextrose Gel 1Dose(s) Oral once PRN  dextrose 50% Injectable 12.5Gram(s) IV Push once  dextrose 50% Injectable 25Gram(s) IV Push once  dextrose 50% Injectable 25Gram(s) IV Push once  glucagon  Injectable 1milliGRAM(s) IntraMuscular once PRN  atorvastatin 20milliGRAM(s) Oral at bedtime  insulin lispro (HumaLOG) corrective regimen sliding scale  SubCutaneous Before meals and at bedtime  insulin glargine Injectable (LANTUS) 15Unit(s) SubCutaneous at bedtime  insulin lispro Injectable (HumaLOG) 3Unit(s) SubCutaneous three times a day before meals  methylPREDNISolone sodium succinate Injectable 40milliGRAM(s) IV Push daily  heparin  Injectable 5000Unit(s) SubCutaneous every 12 hours  aspirin  chewable 81milliGRAM(s) Oral daily  lidocaine   Patch 1Patch Transdermal every 72 hours  lidocaine   Patch 1Patch Transdermal daily  dextrose 5%. 1000milliLiter(s) IV Continuous <Continuous>  sodium chloride 0.9%. 1000milliLiter(s) IV Continuous <Continuous>        PHYSICAL EXAM:  T(C): 36.5, Max: 36.7 (03-22 @ 11:32)  HR: 77 (71 - 80)  BP: 106/56 (102/60 - 110/66)  RR: 22 (17 - 22)  SpO2: 92% (92% - 100%)  Wt(kg): --  I&O's Summary    I & Os for current day (as of 23 Mar 2017 09:01)  =============================================  IN: 2085 ml / OUT: 600 ml / NET: 1485 ml        Appearance: Normal	  HEENT:   Normal oral mucosa, PERRL, EOMI	  Cardiovascular: Normal S1 S2, No JVD, No murmurs, No edema  Respiratory: Lungs clear to auscultation	  Gastrointestinal:  Soft, Non-tender, + BS	  Skin: No rashes, No ecchymoses, No cyanosis  Extremities: Normal range of motion, No clubbing, cyanosis or edema  Vascular: Peripheral pulses palpable 2+ bilaterally                            10.5   10.1  )-----------( 126      ( 23 Mar 2017 07:30 )             31.7     23 Mar 2017 07:31    132    |  89     |  108.0  ----------------------------<  266    4.7     |  32.0   |  2.80     Ca    8.6        23 Mar 2017 07:31        ASSESSMENT/PLAN: 	68F with hx of DM, HTN, CRF, CHF (diastolic). Found by EMS to be in PEA. ROSC with resuscitation.   PEA again in ER, resuscitation was resumed and again there was ROSC. S/p Levophed, severe respiratory acidosis.   /P Vent support, now extubated. Echo EF of 30-35% (worse than prior EF)  Hx of CAD, prior MI. May come to cardiac cath on this admission when approved by Renal. Severe azotemia. 06-Sep-2023

## 2024-03-09 NOTE — PROGRESS NOTE ADULT - SUBJECTIVE AND OBJECTIVE BOX
NEPHROLOGY INTERVAL HPI/OVERNIGHT EVENTS: no new events; oob in chair.    MEDICATIONS  (STANDING):  dextrose 50% Injectable 12.5Gram(s) IV Push once  dextrose 50% Injectable 25Gram(s) IV Push once  dextrose 50% Injectable 25Gram(s) IV Push once  aspirin  chewable 81milliGRAM(s) Oral daily  atorvastatin 20milliGRAM(s) Oral at bedtime  insulin lispro (HumaLOG) corrective regimen sliding scale  SubCutaneous Before meals and at bedtime  ALBUTerol/ipratropium for Nebulization 3milliLiter(s) Nebulizer every 6 hours  lidocaine   Patch 1Patch Transdermal daily  pantoprazole    Tablet 40milliGRAM(s) Oral before breakfast  famotidine    Tablet 20milliGRAM(s) Oral at bedtime  docusate sodium 100milliGRAM(s) Oral three times a day  senna 2Tablet(s) Oral at bedtime  carvedilol 6.25milliGRAM(s) Oral every 12 hours  dextrose 5%. 1000milliLiter(s) IV Continuous <Continuous>  guaiFENesin    Syrup 100milliGRAM(s) Oral every 4 hours  insulin glargine Injectable (LANTUS) 13Unit(s) SubCutaneous at bedtime  insulin lispro Injectable (HumaLOG) 8Unit(s) SubCutaneous three times a day before meals  epoetin conrado Injectable [id number](s) SubCutaneous <User Schedule>  sodium biphosphate Rectal Enema 1Enema Rectal once  hydrALAZINE 25milliGRAM(s) Oral every 8 hours  isosorbide   mononitrate ER Tablet (IMDUR) 30milliGRAM(s) Oral daily  acetylcysteine  Oral Solution 1200milliGRAM(s) Oral two times a day  insulin lispro Injectable (HumaLOG) 2Unit(s) SubCutaneous once  furosemide   Injectable 80milliGRAM(s) IV Push daily  sodium chloride 0.9%. 1000milliLiter(s) IV Continuous <Continuous>    MEDICATIONS  (PRN):  dextrose Gel 1Dose(s) Oral once PRN Blood Glucose LESS THAN 70 milliGRAM(s)/deciLiter  glucagon  Injectable 1milliGRAM(s) IntraMuscular once PRN Glucose <70 milliGRAM(s)/deciLiter  ALBUTerol    0.083% 2.5milliGRAM(s) Nebulizer every 3 hours PRN Shortness of Breath and/or Wheezing  acetaminophen   Tablet. 650milliGRAM(s) Oral every 6 hours PRN Moderate Pain (4 - 6)  aluminum hydroxide/magnesium hydroxide/simethicone Suspension 30milliLiter(s) Oral every 6 hours PRN Dyspepsia  metoprolol Injectable 5milliGRAM(s) IV Push every 15 minutes PRN for HR greater than 120 sustained a-fib  oxyCODONE  5 mG/acetaminophen 325 mG 2Tablet(s) Oral every 6 hours PRN Severe Pain (7 - 10)      Allergies    IV Contrast (Unknown)    Intolerances        Vital Signs Last 24 Hrs  T(C): 37.1, Max: 37.1 ( @ 15:00)  T(F): 98.7, Max: 98.7 ( @ 15:00)  HR: 83 (78 - 92)  BP: 115/56 (98/556 - 119/58)  BP(mean): --  RR: 18 (18 - 24)  SpO2: 97% (93% - 100%)  Daily     Daily Weight in k.5 (2017 05:10)    PHYSICAL EXAM:    GENERAL: same  HEAD:  wnl  EYES:   ENMT:   NECK: veins not distended upright in chair  NERVOUS SYSTEM:  same  CHEST/LUNG: no wheezes  HEART: same, no rub noted  ABDOMEN: not tender  EXTREMITIES:  pitting bilateral leg edema  LYMPH:   SKIN: no rash   neg.  LABS:                        9.3    8.0   )-----------( 179      ( 2017 06:42 )             29.2     2017 07:25    138    |  90     |  76.0   ----------------------------<  355    4.8     |  34.0   |  2.95     Ca    8.8        2017 07:25  Phos  4.3       2017 06:42  Mg     2.0       2017 06:42      PT/INR - ( 2017 07:25 )   PT: 15.3 sec;   INR: 1.38 ratio         PTT - ( 2017 07:25 )  PTT:32.0 sec            RADIOLOGY & ADDITIONAL TESTS: YUE Collins RN irritable bowel syndrome

## 2025-03-12 NOTE — ED PROVIDER NOTE - PSYCHIATRIC, MLM
Please ice for 20 minutes at least four times daily and keep injury elevated as much as possible until symptoms improve.     Please perform gentle range of motion four times daily    
no apparent risk to self or others.